# Patient Record
Sex: FEMALE | Race: WHITE | NOT HISPANIC OR LATINO | Employment: OTHER | URBAN - METROPOLITAN AREA
[De-identification: names, ages, dates, MRNs, and addresses within clinical notes are randomized per-mention and may not be internally consistent; named-entity substitution may affect disease eponyms.]

---

## 2023-03-10 ENCOUNTER — TELEPHONE (OUTPATIENT)
Dept: PRIMARY CARE | Facility: CLINIC | Age: 78
End: 2023-03-10
Payer: MEDICARE

## 2023-03-10 DIAGNOSIS — I10 HYPERTENSION, UNSPECIFIED TYPE: Primary | ICD-10-CM

## 2023-03-10 RX ORDER — METOPROLOL TARTRATE 50 MG/1
50 TABLET ORAL 2 TIMES DAILY
Qty: 180 TABLET | Refills: 3 | Status: SHIPPED | OUTPATIENT
Start: 2023-03-10

## 2023-03-10 RX ORDER — METOPROLOL TARTRATE 50 MG/1
50 TABLET ORAL 2 TIMES DAILY
COMMUNITY
End: 2023-03-10 | Stop reason: SDUPTHER

## 2023-03-27 ENCOUNTER — TELEPHONE (OUTPATIENT)
Dept: PRIMARY CARE | Facility: CLINIC | Age: 78
End: 2023-03-27
Payer: MEDICARE

## 2023-03-27 NOTE — TELEPHONE ENCOUNTER
Refill BD pen needles Buffy 32G x 4mm uses 6 daily.      Refill 90 day supply w/Refills  to [facility] [place]

## 2023-03-28 DIAGNOSIS — E11.9 TYPE 2 DIABETES MELLITUS WITHOUT COMPLICATION, WITHOUT LONG-TERM CURRENT USE OF INSULIN (MULTI): ICD-10-CM

## 2023-03-28 PROBLEM — H61.20 CERUMEN IMPACTION: Status: ACTIVE | Noted: 2023-03-28

## 2023-03-28 PROBLEM — D17.71 ANGIOMYOLIPOMA OF LEFT KIDNEY: Status: ACTIVE | Noted: 2023-03-28

## 2023-03-28 PROBLEM — H25.812 COMBINED FORMS OF AGE-RELATED CATARACT OF LEFT EYE: Status: ACTIVE | Noted: 2023-03-28

## 2023-03-28 PROBLEM — R74.01 ELEVATED TRANSAMINASE LEVEL: Status: ACTIVE | Noted: 2023-03-28

## 2023-03-28 PROBLEM — C64.2 RENAL CELL CARCINOMA OF LEFT KIDNEY (MULTI): Status: ACTIVE | Noted: 2023-03-28

## 2023-03-28 PROBLEM — E88.810 INSULIN RESISTANCE SYNDROME: Status: ACTIVE | Noted: 2023-03-28

## 2023-03-28 PROBLEM — G47.00 INSOMNIA: Status: ACTIVE | Noted: 2023-03-28

## 2023-03-28 PROBLEM — R92.2 DENSE BREAST: Status: ACTIVE | Noted: 2023-03-28

## 2023-03-28 PROBLEM — H43.11 VITREOUS HEMORRHAGE OF RIGHT EYE (MULTI): Status: ACTIVE | Noted: 2022-06-28

## 2023-03-28 PROBLEM — E83.52 HYPERCALCEMIA: Status: ACTIVE | Noted: 2023-03-28

## 2023-03-28 PROBLEM — H35.89 RADIATION RETINOPATHY: Status: ACTIVE | Noted: 2023-03-28

## 2023-03-28 PROBLEM — H16.223 KERATOCONJUNCTIVITIS SICCA OF BOTH EYES NOT DUE TO SJOGREN'S SYNDROME: Status: ACTIVE | Noted: 2023-03-28

## 2023-03-28 PROBLEM — H40.053 OCULAR HYPERTENSION, BILATERAL: Status: ACTIVE | Noted: 2023-03-28

## 2023-03-28 PROBLEM — M25.512 LEFT SHOULDER PAIN: Status: ACTIVE | Noted: 2023-03-28

## 2023-03-28 PROBLEM — H52.03 HYPERMETROPIA OF BOTH EYES: Status: ACTIVE | Noted: 2021-08-04

## 2023-03-28 PROBLEM — E55.9 VITAMIN D DEFICIENCY: Status: ACTIVE | Noted: 2023-03-28

## 2023-03-28 PROBLEM — S05.01XA CORNEAL ABRASION, RIGHT: Status: ACTIVE | Noted: 2023-03-28

## 2023-03-28 PROBLEM — Z96.1 PSEUDOPHAKIA OF BOTH EYES: Status: ACTIVE | Noted: 2021-12-16

## 2023-03-28 PROBLEM — H52.13 MYOPIA WITH PRESBYOPIA OF BOTH EYES: Status: ACTIVE | Noted: 2021-08-04

## 2023-03-28 PROBLEM — H52.202 ASTIGMATISM OF LEFT EYE: Status: ACTIVE | Noted: 2021-08-04

## 2023-03-28 PROBLEM — H25.12 AGE-RELATED NUCLEAR CATARACT, LEFT: Status: ACTIVE | Noted: 2023-03-28

## 2023-03-28 PROBLEM — Z85.048: Status: ACTIVE | Noted: 2022-06-28

## 2023-03-28 PROBLEM — H35.371 EPIRETINAL MEMBRANE (ERM) OF RIGHT EYE: Status: ACTIVE | Noted: 2023-03-28

## 2023-03-28 PROBLEM — K21.9 GASTRO-ESOPHAGEAL REFLUX DISEASE WITHOUT ESOPHAGITIS: Status: ACTIVE | Noted: 2022-06-28

## 2023-03-28 PROBLEM — D51.9 VITAMIN B12 DEFICIENCY ANEMIA: Status: ACTIVE | Noted: 2023-03-28

## 2023-03-28 PROBLEM — R30.0 DYSURIA: Status: ACTIVE | Noted: 2023-03-28

## 2023-03-28 PROBLEM — E53.8 VITAMIN B 12 DEFICIENCY: Status: ACTIVE | Noted: 2023-03-28

## 2023-03-28 PROBLEM — R92.30 DENSE BREAST: Status: ACTIVE | Noted: 2023-03-28

## 2023-03-28 PROBLEM — H52.4 MYOPIA WITH PRESBYOPIA OF BOTH EYES: Status: ACTIVE | Noted: 2021-08-04

## 2023-03-28 PROBLEM — H02.88A MEIBOMIAN GLAND DYSFUNCTION (MGD) OF UPPER AND LOWER EYELID OF RIGHT EYE: Status: ACTIVE | Noted: 2023-03-28

## 2023-03-28 PROBLEM — H40.9 GLAUCOMA: Status: ACTIVE | Noted: 2023-03-28

## 2023-03-28 PROBLEM — H91.92 HEARING LOSS OF LEFT EAR: Status: ACTIVE | Noted: 2023-03-28

## 2023-03-28 PROBLEM — D17.71 ANGIOMYOLIPOMA OF RIGHT KIDNEY: Status: ACTIVE | Noted: 2023-03-28

## 2023-03-28 PROBLEM — E03.9 HYPOTHYROIDISM: Status: ACTIVE | Noted: 2022-06-28

## 2023-03-28 PROBLEM — N60.99 ATYPICAL DUCTAL HYPERPLASIA OF BREAST: Status: ACTIVE | Noted: 2023-03-28

## 2023-03-28 PROBLEM — E78.5 HYPERLIPIDEMIA, UNSPECIFIED: Status: ACTIVE | Noted: 2022-06-28

## 2023-03-28 PROBLEM — E78.1 HYPERTRIGLYCERIDEMIA: Status: ACTIVE | Noted: 2023-03-28

## 2023-03-28 PROBLEM — I10 HYPERTENSION: Status: ACTIVE | Noted: 2022-06-28

## 2023-03-28 PROBLEM — H90.5 PERCEPTIVE HEARING LOSS: Status: ACTIVE | Noted: 2023-03-28

## 2023-03-28 PROBLEM — C43.9 MELANOMA (MULTI): Status: ACTIVE | Noted: 2023-03-28

## 2023-03-28 PROBLEM — T50.8X5A ALLERGIC REACTION TO CONTRAST DYE: Status: ACTIVE | Noted: 2023-03-28

## 2023-03-28 PROBLEM — H02.88B MEIBOMIAN GLAND DYSFUNCTION (MGD) OF UPPER AND LOWER EYELID OF LEFT EYE: Status: ACTIVE | Noted: 2023-03-28

## 2023-03-28 PROBLEM — H04.123 DRY EYE SYNDROME OF BILATERAL LACRIMAL GLANDS: Status: ACTIVE | Noted: 2023-03-28

## 2023-03-28 PROBLEM — N39.0 URINARY TRACT INFECTION: Status: ACTIVE | Noted: 2023-03-28

## 2023-03-28 PROBLEM — R53.83 FATIGUE: Status: ACTIVE | Noted: 2023-03-28

## 2023-03-28 PROBLEM — M25.561 RIGHT KNEE PAIN: Status: ACTIVE | Noted: 2023-03-28

## 2023-03-28 PROBLEM — R92.8 ABNORMAL FINDING ON BREAST IMAGING: Status: ACTIVE | Noted: 2023-03-28

## 2023-03-28 PROBLEM — D86.9 SARCOIDOSIS: Status: ACTIVE | Noted: 2023-03-28

## 2023-03-28 PROBLEM — H43.811 PVD (POSTERIOR VITREOUS DETACHMENT), RIGHT EYE: Status: ACTIVE | Noted: 2023-03-28

## 2023-03-28 PROBLEM — F41.9 ANXIETY: Status: ACTIVE | Noted: 2023-03-28

## 2023-03-28 PROBLEM — N20.0 NEPHROLITHIASIS: Status: ACTIVE | Noted: 2023-03-28

## 2023-03-28 PROBLEM — N28.9 RENAL INSUFFICIENCY: Status: ACTIVE | Noted: 2023-03-28

## 2023-03-28 PROBLEM — T66.XXXA RADIATION RETINOPATHY: Status: ACTIVE | Noted: 2023-03-28

## 2023-03-28 PROBLEM — L29.9 PRURITUS: Status: ACTIVE | Noted: 2023-03-28

## 2023-03-28 PROBLEM — K31.84 GASTROPARESIS: Status: ACTIVE | Noted: 2023-03-28

## 2023-03-28 PROBLEM — H18.49: Status: ACTIVE | Noted: 2021-07-06

## 2023-03-28 PROBLEM — C69.31 CHOROID MELANOMA OF RIGHT EYE (MULTI): Status: ACTIVE | Noted: 2023-03-28

## 2023-03-28 PROBLEM — R82.90 FOUL SMELLING URINE: Status: ACTIVE | Noted: 2023-03-28

## 2023-03-28 RX ORDER — PEN NEEDLE, DIABETIC 32GX 5/32"
NEEDLE, DISPOSABLE MISCELLANEOUS
Qty: 540 EACH | Refills: 3 | Status: SHIPPED | OUTPATIENT
Start: 2023-03-28

## 2023-03-28 RX ORDER — SYRINGE WITH NEEDLE, 1 ML 25GX5/8"
SYRINGE, EMPTY DISPOSABLE MISCELLANEOUS
COMMUNITY
Start: 2022-11-08

## 2023-03-28 RX ORDER — FENOFIBRATE 145 MG/1
1 TABLET, FILM COATED ORAL DAILY
COMMUNITY
Start: 2016-07-06 | End: 2023-07-18 | Stop reason: SDUPTHER

## 2023-03-28 RX ORDER — CYANOCOBALAMIN 1000 UG/ML
INJECTION, SOLUTION INTRAMUSCULAR; SUBCUTANEOUS WEEKLY
COMMUNITY
Start: 2022-06-06 | End: 2023-12-04 | Stop reason: SDUPTHER

## 2023-03-28 RX ORDER — TAFLUPROST OPTHALMIC 0 MG/.3ML
SOLUTION/ DROPS OPHTHALMIC
COMMUNITY
Start: 2017-12-29 | End: 2023-12-04 | Stop reason: ALTCHOICE

## 2023-03-28 RX ORDER — ASPIRIN 81 MG
TABLET, DELAYED RELEASE (ENTERIC COATED) ORAL
COMMUNITY
Start: 2022-11-29

## 2023-03-28 RX ORDER — ESCITALOPRAM OXALATE 10 MG/1
1 TABLET ORAL NIGHTLY
COMMUNITY
Start: 2020-10-07 | End: 2023-05-09 | Stop reason: SDUPTHER

## 2023-03-28 RX ORDER — IBUPROFEN 100 MG/5ML
1 SUSPENSION, ORAL (FINAL DOSE FORM) ORAL DAILY
COMMUNITY
Start: 2021-07-12 | End: 2023-12-04 | Stop reason: ALTCHOICE

## 2023-03-28 RX ORDER — AMLODIPINE BESYLATE 2.5 MG/1
1 TABLET ORAL DAILY
COMMUNITY
Start: 2021-01-22 | End: 2023-09-13 | Stop reason: SDUPTHER

## 2023-03-28 RX ORDER — DORZOLAMIDE HYDROCHLORIDE AND TIMOLOL MALEATE PRESERVATIVE FREE 20; 5 MG/ML; MG/ML
SOLUTION/ DROPS OPHTHALMIC 2 TIMES DAILY
COMMUNITY
Start: 2017-12-29 | End: 2023-12-04 | Stop reason: ALTCHOICE

## 2023-03-28 RX ORDER — VALSARTAN 320 MG/1
1 TABLET ORAL DAILY
COMMUNITY
Start: 2016-07-06 | End: 2024-03-21 | Stop reason: SDUPTHER

## 2023-03-28 RX ORDER — PEN NEEDLE, DIABETIC 32GX 5/32"
NEEDLE, DISPOSABLE MISCELLANEOUS
COMMUNITY
Start: 2023-01-23 | End: 2023-03-28 | Stop reason: SDUPTHER

## 2023-03-28 RX ORDER — PREDNISOLONE ACETATE 10 MG/ML
SUSPENSION/ DROPS OPHTHALMIC 4 TIMES DAILY
COMMUNITY
Start: 2021-11-08 | End: 2023-12-04 | Stop reason: ALTCHOICE

## 2023-03-28 RX ORDER — ASPIRIN 81 MG/1
1 TABLET ORAL DAILY
COMMUNITY
Start: 2016-07-06

## 2023-03-28 RX ORDER — ASPIRIN 325 MG
TABLET, DELAYED RELEASE (ENTERIC COATED) ORAL
COMMUNITY
Start: 2022-06-02 | End: 2023-09-13 | Stop reason: SDUPTHER

## 2023-03-28 RX ORDER — METFORMIN HYDROCHLORIDE 1000 MG/1
1 TABLET ORAL
COMMUNITY
Start: 2016-07-06 | End: 2024-01-09 | Stop reason: SDUPTHER

## 2023-03-28 RX ORDER — MOXIFLOXACIN 5 MG/ML
SOLUTION/ DROPS OPHTHALMIC 4 TIMES DAILY
COMMUNITY
Start: 2021-11-08 | End: 2023-12-04 | Stop reason: ALTCHOICE

## 2023-03-28 RX ORDER — PANTOPRAZOLE SODIUM 40 MG/1
1 TABLET, DELAYED RELEASE ORAL DAILY
COMMUNITY
Start: 2020-09-19 | End: 2023-09-06 | Stop reason: ALTCHOICE

## 2023-03-28 RX ORDER — KETOROLAC TROMETHAMINE 5 MG/ML
SOLUTION OPHTHALMIC
COMMUNITY
Start: 2021-12-03 | End: 2023-12-04 | Stop reason: ALTCHOICE

## 2023-03-28 RX ORDER — LEVOTHYROXINE SODIUM 125 UG/1
1 TABLET ORAL DAILY
COMMUNITY
Start: 2018-01-26 | End: 2024-01-05 | Stop reason: SDUPTHER

## 2023-03-28 RX ORDER — ORAL SEMAGLUTIDE 7 MG/1
1 TABLET ORAL DAILY
COMMUNITY
Start: 2022-11-29 | End: 2023-09-05 | Stop reason: SDUPTHER

## 2023-03-28 RX ORDER — ATORVASTATIN CALCIUM 10 MG/1
1 TABLET, FILM COATED ORAL DAILY
COMMUNITY
Start: 2020-09-19 | End: 2023-10-05 | Stop reason: SDUPTHER

## 2023-03-28 RX ORDER — INSULIN GLARGINE 100 [IU]/ML
INJECTION, SOLUTION SUBCUTANEOUS
COMMUNITY
Start: 2016-07-06 | End: 2023-07-17 | Stop reason: SDUPTHER

## 2023-03-28 RX ORDER — CYCLOSPORINE 0.5 MG/ML
EMULSION OPHTHALMIC EVERY 12 HOURS
COMMUNITY
Start: 2021-04-27 | End: 2023-12-04 | Stop reason: ALTCHOICE

## 2023-04-24 ENCOUNTER — TELEPHONE (OUTPATIENT)
Dept: PRIMARY CARE | Facility: CLINIC | Age: 78
End: 2023-04-24
Payer: MEDICARE

## 2023-04-24 DIAGNOSIS — Z79.4 TYPE 2 DIABETES MELLITUS WITH OTHER SPECIFIED COMPLICATION, WITH LONG-TERM CURRENT USE OF INSULIN (MULTI): Primary | ICD-10-CM

## 2023-04-24 DIAGNOSIS — E11.69 TYPE 2 DIABETES MELLITUS WITH OTHER SPECIFIED COMPLICATION, WITH LONG-TERM CURRENT USE OF INSULIN (MULTI): Primary | ICD-10-CM

## 2023-04-24 RX ORDER — INSULIN LISPRO 100 [IU]/ML
10 INJECTION, SOLUTION INTRAVENOUS; SUBCUTANEOUS
COMMUNITY
Start: 2016-07-06 | End: 2023-04-24 | Stop reason: SDUPTHER

## 2023-04-24 RX ORDER — INSULIN LISPRO 100 [IU]/ML
INJECTION, SOLUTION INTRAVENOUS; SUBCUTANEOUS
Qty: 15 ML | Refills: 3 | Status: SHIPPED | OUTPATIENT
Start: 2023-04-24 | End: 2023-09-06 | Stop reason: SDUPTHER

## 2023-04-24 NOTE — TELEPHONE ENCOUNTER
Humalog Kwik Pen 100Unit/ML Inject 10U before meals and up to 10U per sliding scale.  .[id number] pen w/3 Refills    [facility] [place]

## 2023-05-09 DIAGNOSIS — F41.9 ANXIETY: ICD-10-CM

## 2023-05-09 RX ORDER — ESCITALOPRAM OXALATE 10 MG/1
10 TABLET ORAL NIGHTLY
Qty: 90 TABLET | Refills: 3 | Status: SHIPPED | OUTPATIENT
Start: 2023-05-09 | End: 2024-05-23 | Stop reason: SDUPTHER

## 2023-07-17 ENCOUNTER — TELEPHONE (OUTPATIENT)
Dept: PRIMARY CARE | Facility: CLINIC | Age: 78
End: 2023-07-17
Payer: MEDICARE

## 2023-07-17 DIAGNOSIS — E78.1 HYPERTRIGLYCERIDEMIA: ICD-10-CM

## 2023-07-17 DIAGNOSIS — E11.9 CONTROLLED TYPE 2 DIABETES MELLITUS WITHOUT COMPLICATION, WITH LONG-TERM CURRENT USE OF INSULIN (MULTI): ICD-10-CM

## 2023-07-17 DIAGNOSIS — Z79.4 CONTROLLED TYPE 2 DIABETES MELLITUS WITHOUT COMPLICATION, WITH LONG-TERM CURRENT USE OF INSULIN (MULTI): ICD-10-CM

## 2023-07-17 NOTE — TELEPHONE ENCOUNTER
Refill 90 day supply w/Refills  to [facility]    -fenofibrate (Tricor) 145 mg tablet 1XDAY  -Lantus Solostar U-100 Insulin 100 unit/mL (3 mL) pen

## 2023-07-18 RX ORDER — FENOFIBRATE 145 MG/1
145 TABLET, FILM COATED ORAL DAILY
Qty: 90 TABLET | Refills: 3 | Status: SHIPPED | OUTPATIENT
Start: 2023-07-18 | End: 2023-07-20 | Stop reason: SDUPTHER

## 2023-07-18 RX ORDER — INSULIN GLARGINE 100 [IU]/ML
60 INJECTION, SOLUTION SUBCUTANEOUS 2 TIMES DAILY
Qty: 45 ML | Refills: 1 | Status: SHIPPED | OUTPATIENT
Start: 2023-07-18 | End: 2023-07-21 | Stop reason: SDUPTHER

## 2023-07-20 ENCOUNTER — TELEPHONE (OUTPATIENT)
Dept: PRIMARY CARE | Facility: CLINIC | Age: 78
End: 2023-07-20
Payer: MEDICARE

## 2023-07-20 DIAGNOSIS — E78.1 HYPERTRIGLYCERIDEMIA: ICD-10-CM

## 2023-07-20 RX ORDER — FENOFIBRATE 145 MG/1
145 TABLET, FILM COATED ORAL DAILY
Qty: 90 TABLET | Refills: 3 | Status: SHIPPED | OUTPATIENT
Start: 2023-07-20 | End: 2024-05-06

## 2023-07-21 ENCOUNTER — TELEPHONE (OUTPATIENT)
Dept: PRIMARY CARE | Facility: CLINIC | Age: 78
End: 2023-07-21
Payer: MEDICARE

## 2023-07-21 DIAGNOSIS — E11.9 CONTROLLED TYPE 2 DIABETES MELLITUS WITHOUT COMPLICATION, WITH LONG-TERM CURRENT USE OF INSULIN (MULTI): ICD-10-CM

## 2023-07-21 DIAGNOSIS — Z79.4 CONTROLLED TYPE 2 DIABETES MELLITUS WITHOUT COMPLICATION, WITH LONG-TERM CURRENT USE OF INSULIN (MULTI): ICD-10-CM

## 2023-07-21 RX ORDER — INSULIN GLARGINE 100 [IU]/ML
60 INJECTION, SOLUTION SUBCUTANEOUS 2 TIMES DAILY
Qty: 45 ML | Refills: 1 | Status: SHIPPED | OUTPATIENT
Start: 2023-07-21 | End: 2023-11-20 | Stop reason: SDUPTHER

## 2023-07-21 NOTE — TELEPHONE ENCOUNTER
Noy, can you please resend the Lantus.  It was sent to wrong pharmacy!!    Send to correct pharmacy at [facility]    THANK YOU!!

## 2023-09-01 ENCOUNTER — TELEPHONE (OUTPATIENT)
Dept: PRIMARY CARE | Facility: CLINIC | Age: 78
End: 2023-09-01
Payer: MEDICARE

## 2023-09-05 DIAGNOSIS — E11.9 TYPE 2 DIABETES MELLITUS WITHOUT COMPLICATION, WITH LONG-TERM CURRENT USE OF INSULIN (MULTI): ICD-10-CM

## 2023-09-05 DIAGNOSIS — Z79.4 TYPE 2 DIABETES MELLITUS WITHOUT COMPLICATION, WITH LONG-TERM CURRENT USE OF INSULIN (MULTI): ICD-10-CM

## 2023-09-05 RX ORDER — ORAL SEMAGLUTIDE 7 MG/1
1 TABLET ORAL DAILY
Qty: 90 TABLET | Refills: 3 | Status: SHIPPED | OUTPATIENT
Start: 2023-09-05 | End: 2023-12-04 | Stop reason: DRUGHIGH

## 2023-09-06 ENCOUNTER — OFFICE VISIT (OUTPATIENT)
Dept: PRIMARY CARE | Facility: CLINIC | Age: 78
End: 2023-09-06
Payer: MEDICARE

## 2023-09-06 VITALS
HEART RATE: 74 BPM | RESPIRATION RATE: 16 BRPM | DIASTOLIC BLOOD PRESSURE: 84 MMHG | WEIGHT: 206 LBS | HEIGHT: 67 IN | OXYGEN SATURATION: 99 % | BODY MASS INDEX: 32.33 KG/M2 | SYSTOLIC BLOOD PRESSURE: 128 MMHG

## 2023-09-06 DIAGNOSIS — E78.5 HYPERLIPIDEMIA, UNSPECIFIED HYPERLIPIDEMIA TYPE: ICD-10-CM

## 2023-09-06 DIAGNOSIS — I10 PRIMARY HYPERTENSION: ICD-10-CM

## 2023-09-06 DIAGNOSIS — E03.9 HYPOTHYROIDISM, UNSPECIFIED TYPE: ICD-10-CM

## 2023-09-06 DIAGNOSIS — E11.69 TYPE 2 DIABETES MELLITUS WITH OTHER SPECIFIED COMPLICATION, WITH LONG-TERM CURRENT USE OF INSULIN (MULTI): ICD-10-CM

## 2023-09-06 DIAGNOSIS — R06.2 WHEEZING: ICD-10-CM

## 2023-09-06 DIAGNOSIS — Z79.4 TYPE 2 DIABETES MELLITUS WITHOUT COMPLICATION, WITH LONG-TERM CURRENT USE OF INSULIN (MULTI): ICD-10-CM

## 2023-09-06 DIAGNOSIS — Z79.4 TYPE 2 DIABETES MELLITUS WITH OTHER SPECIFIED COMPLICATION, WITH LONG-TERM CURRENT USE OF INSULIN (MULTI): ICD-10-CM

## 2023-09-06 DIAGNOSIS — R06.09 DYSPNEA ON EXERTION: Primary | ICD-10-CM

## 2023-09-06 DIAGNOSIS — E11.9 TYPE 2 DIABETES MELLITUS WITHOUT COMPLICATION, WITH LONG-TERM CURRENT USE OF INSULIN (MULTI): ICD-10-CM

## 2023-09-06 DIAGNOSIS — E55.9 MILD VITAMIN D DEFICIENCY: ICD-10-CM

## 2023-09-06 LAB — POC HEMOGLOBIN A1C: 8.4 % (ref 4.2–6.5)

## 2023-09-06 PROCEDURE — 99214 OFFICE O/P EST MOD 30 MIN: CPT | Performed by: INTERNAL MEDICINE

## 2023-09-06 PROCEDURE — 3079F DIAST BP 80-89 MM HG: CPT | Performed by: INTERNAL MEDICINE

## 2023-09-06 PROCEDURE — 83036 HEMOGLOBIN GLYCOSYLATED A1C: CPT | Performed by: INTERNAL MEDICINE

## 2023-09-06 PROCEDURE — 3074F SYST BP LT 130 MM HG: CPT | Performed by: INTERNAL MEDICINE

## 2023-09-06 PROCEDURE — 1036F TOBACCO NON-USER: CPT | Performed by: INTERNAL MEDICINE

## 2023-09-06 PROCEDURE — 1159F MED LIST DOCD IN RCRD: CPT | Performed by: INTERNAL MEDICINE

## 2023-09-06 RX ORDER — INSULIN LISPRO 100 [IU]/ML
INJECTION, SOLUTION INTRAVENOUS; SUBCUTANEOUS
Qty: 15 ML | Refills: 11 | Status: SHIPPED | OUTPATIENT
Start: 2023-09-06 | End: 2023-09-06 | Stop reason: SDUPTHER

## 2023-09-06 RX ORDER — INSULIN LISPRO 100 [IU]/ML
INJECTION, SOLUTION INTRAVENOUS; SUBCUTANEOUS
Qty: 15 ML | Refills: 11 | Status: SHIPPED | OUTPATIENT
Start: 2023-09-06 | End: 2024-05-02 | Stop reason: SDUPTHER

## 2023-09-06 RX ORDER — ALBUTEROL SULFATE 90 UG/1
2 AEROSOL, METERED RESPIRATORY (INHALATION) EVERY 4 HOURS PRN
Qty: 6.7 G | Refills: 5 | Status: SHIPPED | OUTPATIENT
Start: 2023-09-06 | End: 2024-06-04 | Stop reason: SDUPTHER

## 2023-09-06 ASSESSMENT — ENCOUNTER SYMPTOMS
MYALGIAS: 0
VOMITING: 0
CONSTIPATION: 0
DIARRHEA: 0
SHORTNESS OF BREATH: 1
FEVER: 0
NAUSEA: 0
JOINT SWELLING: 0
COUGH: 0
CHILLS: 0
DIAPHORESIS: 0

## 2023-09-06 ASSESSMENT — PATIENT HEALTH QUESTIONNAIRE - PHQ9
2. FEELING DOWN, DEPRESSED OR HOPELESS: NOT AT ALL
1. LITTLE INTEREST OR PLEASURE IN DOING THINGS: NOT AT ALL
SUM OF ALL RESPONSES TO PHQ9 QUESTIONS 1 AND 2: 0

## 2023-09-06 NOTE — PATIENT INSTRUCTIONS
CHEST XRAY AND ECHO TO MAKE SURE HEART WORKING WELL AND NO INTRINSIC LUNG ISSUE    2.  TRIAL OF ALBUTEROL INHALER FOR PROBABLE ASTHM,A VS. MILD COPD    3.  INCREASE RYBELSUS TO 14 MG DAILY, THIS SHOULD HJELP BRING DOWN THE A1C WHICH IS 8.4% TODAY    4.  REFILL ON HUMALOG KWIKPEN SENT IN FOR YOU    5.  IF GLUCOSES AREN'T COMING DOWN WITH INCREASE IN RYBELSUS, PLEASE CALL BACK    6.  FASTING LABS ARE ORDERED FOR YOU    7.  6 MONTH FOLLOW UP OR AS NEEDED

## 2023-09-06 NOTE — PROGRESS NOTES
"Subjective   Luba Cortez is a 78 y.o. female who presents for FOLLOW UP   PT SAYS SHE HAS BEEN HAVING HIGH BLOOD SUGAR READING IN 'S   OCCASIONAL SOB   SHE WAS WALKING AT [facility] GAME HAD TO TAKE BREAKS SHOPPING   REFILLS NEEDED     HPI   HAD TROUBLE BREATHING GETTING BACK TO THE CAR, TO STOP AND CATCH BREATH, HAS HAPPENED TO HER BEFORE.    NO ASTHMA, PREVIOUS SMOKER.  MAYBE SOME WHEEZING.  NOT REALLY COUGHING UP PHLEGM.    HAVEN'T WENT ANYWHERE SINCE.  DIDN'T GET SHORT OF BREATH WALKING IN HERE.  SLEEP FLAT WITHOUT ORTHOPNEA OR PND.  NO LEG EDEMA.    LAST MONTH OR SO GLUCOSES GOING UP A LITTLE.  TAKING 7 OF REBYLSUS.      TAKE HER MEDS AS PRESCRIBED  Review of Systems   Constitutional:  Negative for chills, diaphoresis and fever.   Respiratory:  Positive for shortness of breath. Negative for cough.    Cardiovascular:  Negative for chest pain and leg swelling.   Gastrointestinal:  Negative for constipation, diarrhea, nausea and vomiting.   Musculoskeletal:  Negative for joint swelling and myalgias.       Objective   /84   Pulse 74   Resp 16   Ht 1.702 m (5' 7\")   Wt 93.4 kg (206 lb)   SpO2 99%   BMI 32.26 kg/m²     Physical Exam  Vitals reviewed.   Constitutional:       General: She is not in acute distress.     Appearance: She is obese. She is not ill-appearing.   Neck:      Comments: NO ELEVATED JVD  Cardiovascular:      Rate and Rhythm: Normal rate and regular rhythm.      Pulses: Normal pulses.      Heart sounds: No murmur heard.     No gallop.   Pulmonary:      Breath sounds: Normal breath sounds. No wheezing, rhonchi or rales.      Comments: PROLONGED EXPIRATORY PHASE WITHOUT WHEEZE RALES OR RONCHI  Abdominal:      General: Abdomen is flat. Bowel sounds are normal.      Palpations: Abdomen is soft.      Tenderness: There is no guarding or rebound.   Musculoskeletal:      Right lower leg: No edema.      Left lower leg: No edema.      Comments: NO LEG EDEMA         Assessment/Plan "   Problem List Items Addressed This Visit       Diabetes mellitus, type 2 (CMS/[facility])    Relevant Medications    semaglutide (Rybelsus) 14 mg tablet tablet    HumaLOG KwikPen Insulin 100 unit/mL injection    Other Relevant Orders    POCT glycosylated hemoglobin (Hb A1C) manually resulted    Transthoracic Echo (TTE) Complete    Hypertension    Relevant Orders    Transthoracic Echo (TTE) Complete    Protein, Urine Random    Hyperlipidemia, unspecified    Relevant Orders    Vitamin B12    Lipid Panel    Comprehensive Metabolic Panel    CBC    Hypothyroidism    Relevant Orders    TSH with reflex to Free T4 if abnormal     Other Visit Diagnoses       Dyspnea on exertion    -  Primary    Relevant Orders    XR chest 2 views    Transthoracic Echo (TTE) Complete    Wheezing        Relevant Medications    albuterol (Proventil HFA) 90 mcg/actuation inhaler    Mild vitamin D deficiency        Relevant Orders    Vitamin D 25-Hydroxy,Total (for eval of Vitamin D levels)          Patient Instructions    CHEST XRAY AND ECHO TO MAKE SURE HEART WORKING WELL AND NO INTRINSIC LUNG ISSUE    2.  TRIAL OF ALBUTEROL INHALER FOR PROBABLE ASTHM,A VS. MILD COPD    3.  INCREASE RYBELSUS TO 14 MG DAILY, THIS SHOULD HJELP BRING DOWN THE A1C WHICH IS 8.4% TODAY    4.  REFILL ON HUMALOG KWIKPEN SENT IN FOR YOU    5.  IF GLUCOSES AREN'T COMING DOWN WITH INCREASE IN RYBELSUS, PLEASE CALL BACK    6.  FASTING LABS ARE ORDERED FOR YOU    7.  6 MONTH FOLLOW UP OR AS NEEDED

## 2023-09-12 ENCOUNTER — LAB (OUTPATIENT)
Dept: LAB | Facility: LAB | Age: 78
End: 2023-09-12
Payer: MEDICARE

## 2023-09-12 DIAGNOSIS — E03.9 HYPOTHYROIDISM, UNSPECIFIED TYPE: ICD-10-CM

## 2023-09-12 DIAGNOSIS — E78.5 HYPERLIPIDEMIA, UNSPECIFIED HYPERLIPIDEMIA TYPE: ICD-10-CM

## 2023-09-12 DIAGNOSIS — E55.9 MILD VITAMIN D DEFICIENCY: ICD-10-CM

## 2023-09-12 DIAGNOSIS — I10 PRIMARY HYPERTENSION: ICD-10-CM

## 2023-09-12 LAB
ALANINE AMINOTRANSFERASE (SGPT) (U/L) IN SER/PLAS: 12 U/L (ref 7–45)
ALBUMIN (G/DL) IN SER/PLAS: 3.7 G/DL (ref 3.4–5)
ALKALINE PHOSPHATASE (U/L) IN SER/PLAS: 65 U/L (ref 33–136)
ANION GAP IN SER/PLAS: 11 MMOL/L (ref 10–20)
ASPARTATE AMINOTRANSFERASE (SGOT) (U/L) IN SER/PLAS: 16 U/L (ref 9–39)
BILIRUBIN TOTAL (MG/DL) IN SER/PLAS: 0.5 MG/DL (ref 0–1.2)
CALCIDIOL (25 OH VITAMIN D3) (NG/ML) IN SER/PLAS: 13 NG/ML
CALCIUM (MG/DL) IN SER/PLAS: 9.9 MG/DL (ref 8.6–10.3)
CARBON DIOXIDE, TOTAL (MMOL/L) IN SER/PLAS: 27 MMOL/L (ref 21–32)
CHLORIDE (MMOL/L) IN SER/PLAS: 109 MMOL/L (ref 98–107)
CHOLESTEROL (MG/DL) IN SER/PLAS: 104 MG/DL (ref 0–199)
CHOLESTEROL IN HDL (MG/DL) IN SER/PLAS: 28 MG/DL
CHOLESTEROL/HDL RATIO: 3.7
COBALAMIN (VITAMIN B12) (PG/ML) IN SER/PLAS: 158 PG/ML (ref 211–911)
CREATININE (MG/DL) IN SER/PLAS: 1.14 MG/DL (ref 0.5–1.05)
CREATININE (MG/DL) IN URINE: 90.8 MG/DL (ref 20–320)
ERYTHROCYTE DISTRIBUTION WIDTH (RATIO) BY AUTOMATED COUNT: 13.6 % (ref 11.5–14.5)
ERYTHROCYTE MEAN CORPUSCULAR HEMOGLOBIN CONCENTRATION (G/DL) BY AUTOMATED: 32.3 G/DL (ref 32–36)
ERYTHROCYTE MEAN CORPUSCULAR VOLUME (FL) BY AUTOMATED COUNT: 95 FL (ref 80–100)
ERYTHROCYTES (10*6/UL) IN BLOOD BY AUTOMATED COUNT: 4.31 X10E12/L (ref 4–5.2)
GFR FEMALE: 50 ML/MIN/1.73M2
GLUCOSE (MG/DL) IN SER/PLAS: 222 MG/DL (ref 74–99)
HEMATOCRIT (%) IN BLOOD BY AUTOMATED COUNT: 40.9 % (ref 36–46)
HEMOGLOBIN (G/DL) IN BLOOD: 13.2 G/DL (ref 12–16)
LDL: 43 MG/DL (ref 0–99)
LEUKOCYTES (10*3/UL) IN BLOOD BY AUTOMATED COUNT: 6.6 X10E9/L (ref 4.4–11.3)
PLATELETS (10*3/UL) IN BLOOD AUTOMATED COUNT: 121 X10E9/L (ref 150–450)
POTASSIUM (MMOL/L) IN SER/PLAS: 4.5 MMOL/L (ref 3.5–5.3)
PROTEIN (MG/DL) IN URINE: 48 MG/DL (ref 5–24)
PROTEIN TOTAL: 6.2 G/DL (ref 6.4–8.2)
PROTEIN/CREATININE (MG/MG) IN URINE: 0.53 MG/MG CREAT (ref 0–0.17)
SODIUM (MMOL/L) IN SER/PLAS: 142 MMOL/L (ref 136–145)
THYROTROPIN (MIU/L) IN SER/PLAS BY DETECTION LIMIT <= 0.05 MIU/L: 0.37 MIU/L (ref 0.44–3.98)
THYROXINE (T4) FREE (NG/DL) IN SER/PLAS: 1.11 NG/DL (ref 0.61–1.12)
TRIGLYCERIDE (MG/DL) IN SER/PLAS: 163 MG/DL (ref 0–149)
UREA NITROGEN (MG/DL) IN SER/PLAS: 26 MG/DL (ref 6–23)
VLDL: 33 MG/DL (ref 0–40)

## 2023-09-12 PROCEDURE — 36415 COLL VENOUS BLD VENIPUNCTURE: CPT

## 2023-09-12 PROCEDURE — 82306 VITAMIN D 25 HYDROXY: CPT

## 2023-09-12 PROCEDURE — 82607 VITAMIN B-12: CPT

## 2023-09-12 PROCEDURE — 82570 ASSAY OF URINE CREATININE: CPT

## 2023-09-12 PROCEDURE — 80053 COMPREHEN METABOLIC PANEL: CPT

## 2023-09-12 PROCEDURE — 80061 LIPID PANEL: CPT

## 2023-09-12 PROCEDURE — 84443 ASSAY THYROID STIM HORMONE: CPT

## 2023-09-12 PROCEDURE — 84156 ASSAY OF PROTEIN URINE: CPT

## 2023-09-12 PROCEDURE — 84439 ASSAY OF FREE THYROXINE: CPT

## 2023-09-12 PROCEDURE — 85027 COMPLETE CBC AUTOMATED: CPT

## 2023-09-13 ENCOUNTER — TELEPHONE (OUTPATIENT)
Dept: PRIMARY CARE | Facility: CLINIC | Age: 78
End: 2023-09-13
Payer: MEDICARE

## 2023-09-13 DIAGNOSIS — E55.9 VITAMIN D DEFICIENCY: ICD-10-CM

## 2023-09-13 DIAGNOSIS — I10 PRIMARY HYPERTENSION: ICD-10-CM

## 2023-09-13 RX ORDER — AMLODIPINE BESYLATE 2.5 MG/1
2.5 TABLET ORAL DAILY
Qty: 90 TABLET | Refills: 3 | Status: SHIPPED | OUTPATIENT
Start: 2023-09-13

## 2023-09-13 RX ORDER — ASPIRIN 325 MG
50000 TABLET, DELAYED RELEASE (ENTERIC COATED) ORAL
Qty: 12 CAPSULE | Refills: 3 | Status: SHIPPED | OUTPATIENT
Start: 2023-09-13 | End: 2024-09-12

## 2023-09-13 NOTE — TELEPHONE ENCOUNTER
Refill 90 day supply w/Refills  to [facility]    -amLODIPine (Norvasc) 2.5 mg tablet 1XDAY

## 2023-09-28 PROBLEM — M19.019 ARTHROPATHY OF SHOULDER REGION: Status: ACTIVE | Noted: 2023-09-28

## 2023-09-28 PROBLEM — D24.1 INTRADUCTAL PAPILLOMA OF RIGHT BREAST: Status: ACTIVE | Noted: 2023-09-28

## 2023-09-28 PROBLEM — N60.09 SOLITARY CYST OF BREAST: Status: ACTIVE | Noted: 2023-09-28

## 2023-09-28 PROBLEM — E66.9 OBESITY, UNSPECIFIED: Status: ACTIVE | Noted: 2023-09-28

## 2023-09-28 PROBLEM — M71.9 DISORDER OF BURSAE AND TENDONS IN SHOULDER REGION: Status: ACTIVE | Noted: 2023-09-28

## 2023-09-28 PROBLEM — G56.20 LESION OF ULNAR NERVE: Status: ACTIVE | Noted: 2023-09-28

## 2023-09-28 PROBLEM — Z86.39 HISTORY OF DIABETES MELLITUS: Status: ACTIVE | Noted: 2023-09-28

## 2023-09-28 PROBLEM — M67.919 DISORDER OF BURSAE AND TENDONS IN SHOULDER REGION: Status: ACTIVE | Noted: 2023-09-28

## 2023-09-28 PROBLEM — T88.6XXA: Status: ACTIVE | Noted: 2023-09-28

## 2023-09-28 RX ORDER — TAMOXIFEN CITRATE 20 MG/1
1 TABLET ORAL DAILY
COMMUNITY
Start: 2020-01-13 | End: 2024-03-21 | Stop reason: SDUPTHER

## 2023-10-02 ENCOUNTER — HOSPITAL ENCOUNTER (OUTPATIENT)
Dept: CARDIOLOGY | Facility: CLINIC | Age: 78
Discharge: HOME | End: 2023-10-02
Payer: MEDICARE

## 2023-10-02 DIAGNOSIS — R06.09 DYSPNEA ON EXERTION: ICD-10-CM

## 2023-10-02 DIAGNOSIS — I15.8 OTHER SECONDARY HYPERTENSION: ICD-10-CM

## 2023-10-02 DIAGNOSIS — I10 HYPERTENSION, UNSPECIFIED TYPE: ICD-10-CM

## 2023-10-02 LAB — EJECTION FRACTION APICAL 4 CHAMBER: 62.3

## 2023-10-02 PROCEDURE — 93306 TTE W/DOPPLER COMPLETE: CPT

## 2023-10-02 PROCEDURE — 93306 TTE W/DOPPLER COMPLETE: CPT | Performed by: INTERNAL MEDICINE

## 2023-10-05 ENCOUNTER — TELEPHONE (OUTPATIENT)
Dept: PRIMARY CARE | Facility: CLINIC | Age: 78
End: 2023-10-05
Payer: MEDICARE

## 2023-10-05 DIAGNOSIS — E78.49 OTHER HYPERLIPIDEMIA: Primary | ICD-10-CM

## 2023-10-05 RX ORDER — ATORVASTATIN CALCIUM 10 MG/1
10 TABLET, FILM COATED ORAL DAILY
Qty: 90 TABLET | Refills: 0 | Status: SHIPPED | OUTPATIENT
Start: 2023-10-05 | End: 2024-01-05 | Stop reason: SDUPTHER

## 2023-10-05 NOTE — TELEPHONE ENCOUNTER
Refill 90 day supply w/Refills  to [facility]    -atorvastatin (Lipitor) 10 mg tablet 1XDAY

## 2023-10-12 ENCOUNTER — ANCILLARY PROCEDURE (OUTPATIENT)
Dept: RADIOLOGY | Facility: CLINIC | Age: 78
End: 2023-10-12
Payer: MEDICARE

## 2023-10-12 DIAGNOSIS — R06.09 DYSPNEA ON EXERTION: ICD-10-CM

## 2023-10-12 PROCEDURE — 71046 X-RAY EXAM CHEST 2 VIEWS: CPT | Performed by: RADIOLOGY

## 2023-10-12 PROCEDURE — 71046 X-RAY EXAM CHEST 2 VIEWS: CPT | Mod: FY

## 2023-10-16 ENCOUNTER — OFFICE VISIT (OUTPATIENT)
Dept: OPHTHALMOLOGY | Facility: CLINIC | Age: 78
End: 2023-10-16
Payer: MEDICARE

## 2023-10-16 DIAGNOSIS — E08.3311: ICD-10-CM

## 2023-10-16 DIAGNOSIS — H35.371 EPIRETINAL MEMBRANE (ERM) OF RIGHT EYE: Primary | ICD-10-CM

## 2023-10-16 DIAGNOSIS — T66.XXXD POST-RADIATION RETINOPATHY, SUBSEQUENT ENCOUNTER: ICD-10-CM

## 2023-10-16 DIAGNOSIS — H35.89 POST-RADIATION RETINOPATHY, SUBSEQUENT ENCOUNTER: ICD-10-CM

## 2023-10-16 PROBLEM — E11.3311: Status: ACTIVE | Noted: 2023-10-16

## 2023-10-16 PROCEDURE — 67028 INJECTION EYE DRUG: CPT | Mod: RIGHT SIDE | Performed by: OPHTHALMOLOGY

## 2023-10-16 PROCEDURE — 92134 CPTRZ OPH DX IMG PST SGM RTA: CPT | Mod: BILATERAL PROCEDURE | Performed by: OPHTHALMOLOGY

## 2023-10-16 ASSESSMENT — CUP TO DISC RATIO
OD_RATIO: 0.6
OS_RATIO: 0.3

## 2023-10-16 ASSESSMENT — TONOMETRY
OD_IOP_MMHG: 18
IOP_METHOD: GOLDMANN APPLANATION
OS_IOP_MMHG: 16

## 2023-10-16 ASSESSMENT — ENCOUNTER SYMPTOMS: EYES NEGATIVE: 1

## 2023-10-16 ASSESSMENT — EXTERNAL EXAM - RIGHT EYE: OD_EXAM: NORMAL

## 2023-10-16 ASSESSMENT — VISUAL ACUITY
METHOD: SNELLEN - LINEAR
OD_SC: 20/100
OS_SC: 20/30

## 2023-10-16 ASSESSMENT — SLIT LAMP EXAM - LIDS
COMMENTS: NORMAL
COMMENTS: NORMAL

## 2023-10-16 ASSESSMENT — CONF VISUAL FIELD
OS_INFERIOR_TEMPORAL_RESTRICTION: 0
OS_SUPERIOR_TEMPORAL_RESTRICTION: 0
OS_INFERIOR_NASAL_RESTRICTION: 0
OS_NORMAL: 1
OS_SUPERIOR_NASAL_RESTRICTION: 0

## 2023-10-16 ASSESSMENT — EXTERNAL EXAM - LEFT EYE: OS_EXAM: NORMAL

## 2023-10-16 NOTE — PROGRESS NOTES
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  -appears stable  -Last CT abd pelvis 9/2017: no liver metastasis- small nodule that has not changed.    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 Healthy lifestyle.       4) OHTN OU, glaucoma suspect  -continue FU and gtts per Dr. Lim.     5 today has DME OD       Treatment options for CNV OD  discussed, including observation, anti-VEGF injections (including Avastin, Lucentis,   Eylea  Vabysmo and  Beovu ), and  laser. Recommend anti-VEGF injections. Eylea done OD today in a sterile manner with Betadine 5% for antisepsis.

## 2023-11-20 ENCOUNTER — TELEPHONE (OUTPATIENT)
Dept: PRIMARY CARE | Facility: CLINIC | Age: 78
End: 2023-11-20
Payer: MEDICARE

## 2023-11-20 DIAGNOSIS — Z79.4 CONTROLLED TYPE 2 DIABETES MELLITUS WITHOUT COMPLICATION, WITH LONG-TERM CURRENT USE OF INSULIN (MULTI): ICD-10-CM

## 2023-11-20 DIAGNOSIS — E11.9 CONTROLLED TYPE 2 DIABETES MELLITUS WITHOUT COMPLICATION, WITH LONG-TERM CURRENT USE OF INSULIN (MULTI): ICD-10-CM

## 2023-11-20 RX ORDER — INSULIN GLARGINE 100 [IU]/ML
45 INJECTION, SOLUTION SUBCUTANEOUS 2 TIMES DAILY
Qty: 3 EACH | Refills: 1 | Status: SHIPPED | OUTPATIENT
Start: 2023-11-20 | End: 2023-12-04 | Stop reason: SDUPTHER

## 2023-11-20 NOTE — TELEPHONE ENCOUNTER
Refill 90 day supply w/Refills  to [facility]    -insulin glargine (Lantus Solostar U-100 Insulin) 100 unit/mL (3 mL) pen 60 UNITS 2XDAILY.      **  Sig: Inject 60 Units under the skin 2 times a day.   Patient taking differently: Inject 45 Units under the skin 2 times a day.

## 2023-11-29 ENCOUNTER — OFFICE VISIT (OUTPATIENT)
Dept: OPHTHALMOLOGY | Facility: CLINIC | Age: 78
End: 2023-11-29
Payer: MEDICARE

## 2023-11-29 DIAGNOSIS — E11.3311 MODERATE NONPROLIFERATIVE DIABETIC RETINOPATHY OF RIGHT EYE WITH MACULAR EDEMA ASSOCIATED WITH TYPE 2 DIABETES MELLITUS (MULTI): ICD-10-CM

## 2023-11-29 DIAGNOSIS — H35.371 EPIRETINAL MEMBRANE (ERM) OF RIGHT EYE: Primary | ICD-10-CM

## 2023-11-29 PROCEDURE — 92134 CPTRZ OPH DX IMG PST SGM RTA: CPT | Mod: BILATERAL PROCEDURE | Performed by: OPHTHALMOLOGY

## 2023-11-29 PROCEDURE — 67028 INJECTION EYE DRUG: CPT | Mod: RIGHT SIDE | Performed by: OPHTHALMOLOGY

## 2023-11-29 PROCEDURE — 99213 OFFICE O/P EST LOW 20 MIN: CPT | Performed by: OPHTHALMOLOGY

## 2023-11-29 ASSESSMENT — ENCOUNTER SYMPTOMS
CONSTITUTIONAL NEGATIVE: 0
NEUROLOGICAL NEGATIVE: 0
PSYCHIATRIC NEGATIVE: 0
MUSCULOSKELETAL NEGATIVE: 0
RESPIRATORY NEGATIVE: 0
CARDIOVASCULAR NEGATIVE: 0
HEMATOLOGIC/LYMPHATIC NEGATIVE: 0
EYES NEGATIVE: 0
GASTROINTESTINAL NEGATIVE: 0
ALLERGIC/IMMUNOLOGIC NEGATIVE: 0
ENDOCRINE NEGATIVE: 0

## 2023-11-29 ASSESSMENT — TONOMETRY
OD_IOP_MMHG: 13
IOP_METHOD: TONOPEN
OS_IOP_MMHG: 13

## 2023-11-29 ASSESSMENT — VISUAL ACUITY
OD_SC+: -2
OD_SC: 20/100
OS_SC: 20/25
METHOD: SNELLEN - LINEAR

## 2023-11-29 NOTE — PROGRESS NOTES
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  -appears stable  -Last CT abd pelvis 9/2017: no liver metastasis- small nodule that has not changed.    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 Healthy lifestyle.       4) OHTN OU, glaucoma suspect  -continue FU and gtts per Dr. Lim.     5 today has DME OD       Treatment options for DME OD  discussed, including observation, anti-VEGF injections (including Avastin, Lucentis,   Eylea  Vabysmo and  Beovu ), and  laser. Recommend anti-VEGF injections. Eylea done OD today in a sterile manner with Betadine 5% for antisepsis.

## 2023-12-04 ENCOUNTER — OFFICE VISIT (OUTPATIENT)
Dept: PRIMARY CARE | Facility: CLINIC | Age: 78
End: 2023-12-04
Payer: MEDICARE

## 2023-12-04 DIAGNOSIS — Z79.4 CONTROLLED TYPE 2 DIABETES MELLITUS WITHOUT COMPLICATION, WITH LONG-TERM CURRENT USE OF INSULIN (MULTI): ICD-10-CM

## 2023-12-04 DIAGNOSIS — E53.8 VITAMIN B 12 DEFICIENCY: ICD-10-CM

## 2023-12-04 DIAGNOSIS — Z00.00 ROUTINE GENERAL MEDICAL EXAMINATION AT HEALTH CARE FACILITY: ICD-10-CM

## 2023-12-04 DIAGNOSIS — Z12.31 ENCOUNTER FOR SCREENING MAMMOGRAM FOR BREAST CANCER: ICD-10-CM

## 2023-12-04 DIAGNOSIS — Z00.00 MEDICARE ANNUAL WELLNESS VISIT, SUBSEQUENT: Primary | ICD-10-CM

## 2023-12-04 DIAGNOSIS — E11.9 CONTROLLED TYPE 2 DIABETES MELLITUS WITHOUT COMPLICATION, WITH LONG-TERM CURRENT USE OF INSULIN (MULTI): ICD-10-CM

## 2023-12-04 DIAGNOSIS — Z79.4 TYPE 2 DIABETES MELLITUS WITHOUT COMPLICATION, WITH LONG-TERM CURRENT USE OF INSULIN (MULTI): ICD-10-CM

## 2023-12-04 DIAGNOSIS — E11.9 TYPE 2 DIABETES MELLITUS WITHOUT COMPLICATION, WITH LONG-TERM CURRENT USE OF INSULIN (MULTI): ICD-10-CM

## 2023-12-04 PROCEDURE — 1159F MED LIST DOCD IN RCRD: CPT | Performed by: INTERNAL MEDICINE

## 2023-12-04 PROCEDURE — G0439 PPPS, SUBSEQ VISIT: HCPCS | Performed by: INTERNAL MEDICINE

## 2023-12-04 PROCEDURE — 1036F TOBACCO NON-USER: CPT | Performed by: INTERNAL MEDICINE

## 2023-12-04 PROCEDURE — 1170F FXNL STATUS ASSESSED: CPT | Performed by: INTERNAL MEDICINE

## 2023-12-04 PROCEDURE — 83036 HEMOGLOBIN GLYCOSYLATED A1C: CPT | Performed by: INTERNAL MEDICINE

## 2023-12-04 PROCEDURE — 99213 OFFICE O/P EST LOW 20 MIN: CPT | Performed by: INTERNAL MEDICINE

## 2023-12-04 PROCEDURE — 3079F DIAST BP 80-89 MM HG: CPT | Performed by: INTERNAL MEDICINE

## 2023-12-04 PROCEDURE — 3075F SYST BP GE 130 - 139MM HG: CPT | Performed by: INTERNAL MEDICINE

## 2023-12-04 RX ORDER — CYANOCOBALAMIN 1000 UG/ML
1000 INJECTION, SOLUTION INTRAMUSCULAR; SUBCUTANEOUS
Qty: 3 ML | Refills: 3 | Status: SHIPPED | OUTPATIENT
Start: 2023-12-04 | End: 2024-12-03

## 2023-12-04 RX ORDER — INSULIN GLARGINE 100 [IU]/ML
45 INJECTION, SOLUTION SUBCUTANEOUS 2 TIMES DAILY
Qty: 45 ML | Refills: 3 | Status: SHIPPED | OUTPATIENT
Start: 2023-12-04 | End: 2024-05-02 | Stop reason: SDUPTHER

## 2023-12-04 RX ORDER — TIRZEPATIDE 2.5 MG/.5ML
2.5 INJECTION, SOLUTION SUBCUTANEOUS
Qty: 2 ML | Refills: 1 | Status: SHIPPED | OUTPATIENT
Start: 2023-12-04 | End: 2023-12-27 | Stop reason: DRUGHIGH

## 2023-12-04 ASSESSMENT — PATIENT HEALTH QUESTIONNAIRE - PHQ9
SUM OF ALL RESPONSES TO PHQ9 QUESTIONS 1 AND 2: 0
2. FEELING DOWN, DEPRESSED OR HOPELESS: NOT AT ALL
1. LITTLE INTEREST OR PLEASURE IN DOING THINGS: NOT AT ALL

## 2023-12-04 ASSESSMENT — ACTIVITIES OF DAILY LIVING (ADL)
GROCERY_SHOPPING: INDEPENDENT
BATHING: INDEPENDENT
MANAGING_FINANCES: INDEPENDENT
DRESSING: INDEPENDENT
DOING_HOUSEWORK: INDEPENDENT
TAKING_MEDICATION: INDEPENDENT

## 2023-12-04 NOTE — PROGRESS NOTES
"Subjective   Reason for Visit: Luba Cortez is an 78 y.o. female here for a Medicare Wellness visit.     Past Medical, Surgical, and Family History reviewed and updated in chart.         HPI  RYBELSUS NOT WORKING VERY PREDICTABLY.     TOOK 2 MONTHS ON 14 MG TO WORK, NOW BROUGHT BLOOD SUGAR DOWN     TAKE 90 UNITS LANTUS DAILY, TAKE HUMALOG 10 UNITS WITH MEALS     NOT LOST WEIGHT     DON'T EAT A LOT OF CARBS.       HAS HAD A SCLERAL TRANSPLANT RIGHT LATERAL EYE 7-8 YEARS AGO FOR SCLERAL RUPTURE THAT RESULTED FROM RADIATION APPLIED TO RIGHT EYE.  LATE CONSEQUENCES OF RADIATION ARE VESSEL RUPTURE IN THE EYE, AND MACULAR DEGENERATION        Patient Care Team:  Patient Care Team:  Sampson Sheth MD as PCP - General (Internal Medicine)  Sampson Sheth MD as PCP - [employer] PCP     Review of Systems   Constitutional:  Negative for chills, diaphoresis and fever.   Respiratory:  Negative for cough and shortness of breath.    Cardiovascular:  Negative for chest pain and leg swelling.   Gastrointestinal:  Negative for constipation, diarrhea, nausea and vomiting.   Endocrine:        PER HPI   Musculoskeletal:  Negative for joint swelling and myalgias.       Objective   Vitals:  Pulse 62   Resp 14   Ht 1.702 m (5' 7\")   Wt 91.2 kg (201 lb)   SpO2 99%   BMI 31.48 kg/m²       Physical Exam  Vitals reviewed.   Constitutional:       General: She is not in acute distress.     Appearance: She is obese. She is not ill-appearing.   Eyes:      Comments: RIGHT LATERAL SCLERA S/P SCLERAL TRANSPLANT   Cardiovascular:      Rate and Rhythm: Normal rate and regular rhythm.      Pulses: Normal pulses.      Heart sounds:      No gallop.   Pulmonary:      Breath sounds: Normal breath sounds. No wheezing, rhonchi or rales.   Abdominal:      General: Abdomen is flat. Bowel sounds are normal.      Palpations: Abdomen is soft.      Tenderness: There is no guarding or rebound.   Musculoskeletal:      Right lower " leg: No edema.      Left lower leg: No edema.         Assessment/Plan   Problem List Items Addressed This Visit       Diabetes mellitus, type 2 (CMS/[facility])    Relevant Medications    tirzepatide (Mounjaro) 2.5 mg/0.5 mL pen injector    Other Relevant Orders    POCT glycosylated hemoglobin (Hb A1C) manually resulted    Vitamin B 12 deficiency    Relevant Medications    cyanocobalamin (Vitamin B-12) 1,000 mcg/mL injection    Medicare annual wellness visit, subsequent - Primary     Other Visit Diagnoses       Controlled type 2 diabetes mellitus without complication, with long-term current use of insulin (CMS/[facility])        Relevant Medications    insulin glargine (Lantus Solostar U-100 Insulin) 100 unit/mL (3 mL) pen    Encounter for screening mammogram for breast cancer        Relevant Orders    BI mammo bilateral screening tomosynthesis    Routine general medical examination at health care facility              Patient Instructions    STOP RYBELSUS, AND START MOUNJARO 2.5 MG SQ WEEKLY    2.  PLEASE CALL IN 1 MONTH FOR DOSE INCREASE IF WELL TOLERATED    3.  REFILLS ON LANTUS AND B12 SENT IN    4.  LABS REVIEWWED LOOK GOOD/STABLE    5.  CHEST XRAY AND ECHOCARDIOGRAM FROM LAST VISIT LOOK GOOD, LIKELY NO CARDIAC CONTRIBUTION TO YOUR SHORT OF BREATH ISSUE, WHICH SEEMS TO BE A LITTLE BETTER WITH B12 ADMINSITRATION AND INHALER    6.  FOLLOW UP 4-6 MONTHS OTR AS NEEDED.

## 2023-12-04 NOTE — PATIENT INSTRUCTIONS
STOP RYBELSUS, AND START MOUNJARO 2.5 MG SQ WEEKLY    2.  PLEASE CALL IN 1 MONTH FOR DOSE INCREASE IF WELL TOLERATED    3.  REFILLS ON LANTUS AND B12 SENT IN    4.  LABS REVIEWWED LOOK GOOD/STABLE    5.  CHEST XRAY AND ECHOCARDIOGRAM FROM LAST VISIT LOOK GOOD, LIKELY NO CARDIAC CONTRIBUTION TO YOUR SHORT OF BREATH ISSUE, WHICH SEEMS TO BE A LITTLE BETTER WITH B12 ADMINSITRATION AND INHALER    6.  FOLLOW UP 4-6 MONTHS OTR AS NEEDED.

## 2023-12-04 NOTE — PROGRESS NOTES
Subjective   Reason for Visit: Luba Cortez is an 78 y.o. female here for a Medicare Wellness visit.   HAD FLU SHOT               HPI  RYBELSUS NOT WORKING VERY PREDICTABLY.    TOOK 2 MONTHS ON 14 MG TO WORK, NOW BROUGHT BLOOD SUGAR DOWN    TAKE 90 UNITS LANTUS DAILY, TAKE HUMALOG 10 UNITS WITH MEALS    NOT LOST WEIGHT    DON'T EAT A LOT OF CARBS.      HAS HAD A SCLERAL TRANSPLANT RIGHT LATERAL EYE 7-8 YEARS AGO FOR SCLERAL RUPTURE THAT RESULTED FROM RADIATION APPLIED TO RIGHT EYE.  LATE CONSEQUENCES OF RADIATION ARE VESSEL RUPTURE IN THE EYE, AND MACULAR DEGENERATION      Patient Care Team:  Sampson Sheth MD as PCP - General (Internal Medicine)  Sampson Sheth MD as PCP - [employer] PCP     Review of Systems    Objective   Vitals:  There were no vitals taken for this visit.      Physical Exam    Assessment/Plan   Problem List Items Addressed This Visit    None

## 2023-12-07 VITALS
RESPIRATION RATE: 14 BRPM | HEIGHT: 67 IN | WEIGHT: 201 LBS | BODY MASS INDEX: 31.55 KG/M2 | DIASTOLIC BLOOD PRESSURE: 80 MMHG | HEART RATE: 62 BPM | OXYGEN SATURATION: 99 % | SYSTOLIC BLOOD PRESSURE: 130 MMHG

## 2023-12-07 PROBLEM — Z00.00 MEDICARE ANNUAL WELLNESS VISIT, SUBSEQUENT: Status: ACTIVE | Noted: 2023-12-07

## 2023-12-07 ASSESSMENT — ENCOUNTER SYMPTOMS
ENDOCRINE COMMENTS: PER HPI
DIARRHEA: 0
FEVER: 0
CONSTIPATION: 0
SHORTNESS OF BREATH: 0
CHILLS: 0
VOMITING: 0
MYALGIAS: 0
JOINT SWELLING: 0
COUGH: 0
DIAPHORESIS: 0
NAUSEA: 0

## 2023-12-27 ENCOUNTER — TELEPHONE (OUTPATIENT)
Dept: PRIMARY CARE | Facility: CLINIC | Age: 78
End: 2023-12-27

## 2023-12-27 ENCOUNTER — OFFICE VISIT (OUTPATIENT)
Dept: OPHTHALMOLOGY | Facility: CLINIC | Age: 78
End: 2023-12-27
Payer: MEDICARE

## 2023-12-27 DIAGNOSIS — E11.3211 MILD NONPROLIFERATIVE DIABETIC RETINOPATHY OF RIGHT EYE WITH MACULAR EDEMA ASSOCIATED WITH TYPE 2 DIABETES MELLITUS (MULTI): ICD-10-CM

## 2023-12-27 DIAGNOSIS — H35.371 EPIRETINAL MEMBRANE (ERM) OF RIGHT EYE: Primary | ICD-10-CM

## 2023-12-27 DIAGNOSIS — E08.3311: ICD-10-CM

## 2023-12-27 DIAGNOSIS — C69.31: ICD-10-CM

## 2023-12-27 DIAGNOSIS — E11.69 TYPE 2 DIABETES MELLITUS WITH OTHER SPECIFIED COMPLICATION, WITHOUT LONG-TERM CURRENT USE OF INSULIN (MULTI): Primary | ICD-10-CM

## 2023-12-27 PROCEDURE — 92134 CPTRZ OPH DX IMG PST SGM RTA: CPT | Mod: BILATERAL PROCEDURE | Performed by: OPHTHALMOLOGY

## 2023-12-27 PROCEDURE — 67028 INJECTION EYE DRUG: CPT | Mod: RIGHT SIDE | Performed by: OPHTHALMOLOGY

## 2023-12-27 RX ORDER — TIRZEPATIDE 5 MG/.5ML
5 INJECTION, SOLUTION SUBCUTANEOUS
Qty: 2 ML | Refills: 1 | Status: SHIPPED | OUTPATIENT
Start: 2023-12-27 | End: 2024-03-21 | Stop reason: SDUPTHER

## 2023-12-27 ASSESSMENT — EXTERNAL EXAM - RIGHT EYE: OD_EXAM: NORMAL

## 2023-12-27 ASSESSMENT — TONOMETRY
IOP_METHOD: GOLDMANN APPLANATION
OD_IOP_MMHG: 10
OS_IOP_MMHG: 15

## 2023-12-27 ASSESSMENT — ENCOUNTER SYMPTOMS
MUSCULOSKELETAL NEGATIVE: 0
PSYCHIATRIC NEGATIVE: 0
RESPIRATORY NEGATIVE: 0
CONSTITUTIONAL NEGATIVE: 0
GASTROINTESTINAL NEGATIVE: 0
ALLERGIC/IMMUNOLOGIC NEGATIVE: 0
NEUROLOGICAL NEGATIVE: 0
ENDOCRINE NEGATIVE: 0
CARDIOVASCULAR NEGATIVE: 0
EYES NEGATIVE: 0
HEMATOLOGIC/LYMPHATIC NEGATIVE: 0

## 2023-12-27 ASSESSMENT — VISUAL ACUITY
OD_SC+: -1
METHOD: SNELLEN - LINEAR
OD_SC: 20/80
OS_SC: 20/25

## 2023-12-27 ASSESSMENT — PACHYMETRY
OD_CT(UM): 562
OS_CT(UM): 551

## 2023-12-27 ASSESSMENT — CUP TO DISC RATIO
OS_RATIO: 0.3
OD_RATIO: 0.6

## 2023-12-27 ASSESSMENT — SLIT LAMP EXAM - LIDS
COMMENTS: NORMAL
COMMENTS: NORMAL

## 2023-12-27 ASSESSMENT — EXTERNAL EXAM - LEFT EYE: OS_EXAM: NORMAL

## 2023-12-27 NOTE — TELEPHONE ENCOUNTER
Pt was supposed to call you to let you know status of the mounjaro after 1 month.  She wanted you to know that it is working and she is not having any issues.

## 2023-12-27 NOTE — PROGRESS NOTES
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 associated with diabetes mellitus due to underlying condition (CMS/HCC)      Impression    1 E11.3311 Controlled type 2 diabetes mellitus with right eye affected by moderate nonproliferative retinopathy and macular edema-Worsening  2 H43.11 Vitreous hemorrhage of right eye-Worsening  3 H40.053 Ocular hypertension, bilateral-Stable  4 T66.XXXA Radiation retinopathy-Improving  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  -appears stable  -Last CT abd pelvis 9/2017: no liver metastasis- small nodule that has not changed.    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 Healthy lifestyle.       4) OHTN OU, glaucoma suspect  -continue FU and gtts per Dr. Lim.     5 today has DME OD       Treatment options for DME OD  discussed, including observation, anti-VEGF injections (including Avastin, Lucentis,   Eylea  Vabysmo and  Beovu ), and  laser. Recommend anti-VEGF injections. Eylea done OD today in a sterile manner with Betadine 5% for antisepsis.

## 2024-01-05 ENCOUNTER — TELEPHONE (OUTPATIENT)
Dept: PRIMARY CARE | Facility: CLINIC | Age: 79
End: 2024-01-05
Payer: MEDICARE

## 2024-01-05 DIAGNOSIS — E03.9 ACQUIRED HYPOTHYROIDISM: Primary | ICD-10-CM

## 2024-01-05 DIAGNOSIS — E78.49 OTHER HYPERLIPIDEMIA: ICD-10-CM

## 2024-01-05 RX ORDER — LEVOTHYROXINE SODIUM 125 UG/1
125 TABLET ORAL DAILY
Qty: 90 TABLET | Refills: 3 | Status: SHIPPED | OUTPATIENT
Start: 2024-01-05 | End: 2024-06-05 | Stop reason: DRUGHIGH

## 2024-01-05 RX ORDER — ATORVASTATIN CALCIUM 10 MG/1
10 TABLET, FILM COATED ORAL DAILY
Qty: 90 TABLET | Refills: 3 | Status: SHIPPED | OUTPATIENT
Start: 2024-01-05

## 2024-01-05 NOTE — TELEPHONE ENCOUNTER
Refill 90 day supply w/Refills  to [facility]    -atorvastatin (Lipitor) 10 mg tablet 1XDAY  -levothyroxine (Synthroid, Levoxyl) 125 mcg tablet 1XDAY

## 2024-01-09 ENCOUNTER — TELEPHONE (OUTPATIENT)
Dept: PRIMARY CARE | Facility: CLINIC | Age: 79
End: 2024-01-09
Payer: MEDICARE

## 2024-01-09 DIAGNOSIS — E11.9 TYPE 2 DIABETES MELLITUS WITHOUT COMPLICATION, WITHOUT LONG-TERM CURRENT USE OF INSULIN (MULTI): ICD-10-CM

## 2024-01-09 RX ORDER — METFORMIN HYDROCHLORIDE 1000 MG/1
1000 TABLET ORAL
Qty: 180 TABLET | Refills: 1 | Status: SHIPPED | OUTPATIENT
Start: 2024-01-09

## 2024-02-19 ENCOUNTER — OFFICE VISIT (OUTPATIENT)
Dept: OPHTHALMOLOGY | Facility: CLINIC | Age: 79
End: 2024-02-19
Payer: MEDICARE

## 2024-02-19 DIAGNOSIS — E11.3211 MILD NONPROLIFERATIVE DIABETIC RETINOPATHY OF RIGHT EYE WITH MACULAR EDEMA ASSOCIATED WITH TYPE 2 DIABETES MELLITUS (MULTI): ICD-10-CM

## 2024-02-19 DIAGNOSIS — H35.371 EPIRETINAL MEMBRANE (ERM) OF RIGHT EYE: Primary | ICD-10-CM

## 2024-02-19 PROCEDURE — 67028 INJECTION EYE DRUG: CPT | Mod: RIGHT SIDE | Performed by: OPHTHALMOLOGY

## 2024-02-19 PROCEDURE — 92134 CPTRZ OPH DX IMG PST SGM RTA: CPT | Performed by: OPHTHALMOLOGY

## 2024-02-19 ASSESSMENT — TONOMETRY
OD_IOP_MMHG: 10
OS_IOP_MMHG: 13
IOP_METHOD: GOLDMANN APPLANATION

## 2024-02-19 ASSESSMENT — SLIT LAMP EXAM - LIDS
COMMENTS: NORMAL
COMMENTS: NORMAL

## 2024-02-19 ASSESSMENT — CONF VISUAL FIELD
OS_SUPERIOR_NASAL_RESTRICTION: 0
OD_NORMAL: 1
OD_INFERIOR_NASAL_RESTRICTION: 0
OS_NORMAL: 1
OS_INFERIOR_NASAL_RESTRICTION: 0
OS_INFERIOR_TEMPORAL_RESTRICTION: 0
OD_SUPERIOR_NASAL_RESTRICTION: 0
OS_SUPERIOR_TEMPORAL_RESTRICTION: 0
OD_SUPERIOR_TEMPORAL_RESTRICTION: 0
OD_INFERIOR_TEMPORAL_RESTRICTION: 0

## 2024-02-19 ASSESSMENT — CUP TO DISC RATIO
OS_RATIO: 0.3
OD_RATIO: 0.6

## 2024-02-19 ASSESSMENT — VISUAL ACUITY
OS_SC: 20/25+1
OD_SC: 20/70-2
METHOD: SNELLEN - LINEAR

## 2024-02-19 ASSESSMENT — EXTERNAL EXAM - LEFT EYE: OS_EXAM: NORMAL

## 2024-02-19 ASSESSMENT — PACHYMETRY
OD_CT(UM): 562
OS_CT(UM): 551

## 2024-02-19 ASSESSMENT — ENCOUNTER SYMPTOMS: EYES NEGATIVE: 1

## 2024-02-19 ASSESSMENT — EXTERNAL EXAM - RIGHT EYE: OD_EXAM: NORMAL

## 2024-02-19 NOTE — PROGRESS NOTES
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  -appears stable  -Last CT abd pelvis 9/2017: no liver metastasis- small nodule that has not changed.    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 Healthy lifestyle.       4) OHTN OU, glaucoma suspect  -continue FU and gtts per Dr. Lim.     5 today has DME OD       Treatment options for DME OD  discussed, including observation, anti-VEGF injections (including Avastin, Lucentis,   Eylea  Vabysmo and  Beovu ), and  laser. Recommend anti-VEGF injections. Eylea done OD today in a sterile manner with Betadine 5% for antisepsis.

## 2024-03-21 ENCOUNTER — TELEPHONE (OUTPATIENT)
Dept: PRIMARY CARE | Facility: CLINIC | Age: 79
End: 2024-03-21
Payer: MEDICARE

## 2024-03-21 DIAGNOSIS — E11.69 TYPE 2 DIABETES MELLITUS WITH OTHER SPECIFIED COMPLICATION, WITHOUT LONG-TERM CURRENT USE OF INSULIN (MULTI): ICD-10-CM

## 2024-03-21 DIAGNOSIS — I10 HYPERTENSION, UNSPECIFIED TYPE: ICD-10-CM

## 2024-03-21 DIAGNOSIS — Z00.00 HEALTHCARE MAINTENANCE: ICD-10-CM

## 2024-03-21 RX ORDER — VALSARTAN 320 MG/1
320 TABLET ORAL DAILY
Qty: 30 TABLET | Refills: 2 | Status: SHIPPED | OUTPATIENT
Start: 2024-03-21 | End: 2024-04-24

## 2024-03-21 RX ORDER — TAMOXIFEN CITRATE 20 MG/1
20 TABLET ORAL DAILY
Qty: 90 TABLET | Refills: 1 | Status: SHIPPED | OUTPATIENT
Start: 2024-03-21

## 2024-03-21 RX ORDER — TIRZEPATIDE 5 MG/.5ML
5 INJECTION, SOLUTION SUBCUTANEOUS
Qty: 2 ML | Refills: 1 | Status: SHIPPED | OUTPATIENT
Start: 2024-03-21 | End: 2024-05-02 | Stop reason: DRUGHIGH

## 2024-03-21 NOTE — TELEPHONE ENCOUNTER
Refills 90 Day Supply:     -valsartan (Diovan) 320 mg tablet   -tirzepatide (Mounjaro) 5 mg/0.5 mL pen injector   -tamoxifen (Nolvadex) 20 mg tablet     [facility]

## 2024-03-26 DIAGNOSIS — E11.69 TYPE 2 DIABETES MELLITUS WITH OTHER SPECIFIED COMPLICATION, WITH LONG-TERM CURRENT USE OF INSULIN (MULTI): Primary | ICD-10-CM

## 2024-03-26 DIAGNOSIS — Z79.4 TYPE 2 DIABETES MELLITUS WITH OTHER SPECIFIED COMPLICATION, WITH LONG-TERM CURRENT USE OF INSULIN (MULTI): Primary | ICD-10-CM

## 2024-04-15 ENCOUNTER — TELEMEDICINE (OUTPATIENT)
Dept: PHARMACY | Facility: HOSPITAL | Age: 79
End: 2024-04-15
Payer: MEDICARE

## 2024-04-15 DIAGNOSIS — E11.69 TYPE 2 DIABETES MELLITUS WITH OTHER SPECIFIED COMPLICATION, WITH LONG-TERM CURRENT USE OF INSULIN (MULTI): ICD-10-CM

## 2024-04-15 DIAGNOSIS — Z79.4 TYPE 2 DIABETES MELLITUS WITH OTHER SPECIFIED COMPLICATION, WITH LONG-TERM CURRENT USE OF INSULIN (MULTI): ICD-10-CM

## 2024-04-15 NOTE — PROGRESS NOTES
[facility] Pharmacist Visit - Diabetes Management  Luba Cortez is a 78 y.o. female was referred to Clinical Pharmacy Team for No chief complaint on file..    Referring Provider: Sampson Sheth, *  PCP: Sampson Sheth MD - last visit: 12/4/23, next visit: 6/4/24    Subjective   HPI  PMH significant for T2DM, gastroparesis, HTN, HLD, hypothyroid, GERD, glaucoma, retinopathy.  Patient has no known history of medullary thyroid cancer, pancreatitis, or complicated UTI.  Known DM complications include retinopathy.  Special needs/barriers to therapy: High medication costs      DIABETES ASSESSMENT  Medication Therapy  Current Medications: metformin 1,00mg twice daily, Lantus 45 units twice daily, Humalog SS with meals (patient reports typically 9-10 units), Mounjaro 5mg once weekly  Previous Medications: Rybelsus (ineffective)    Adherence: Takes medication as directed and reports no missed doses  Adverse Effects: None    Risk Reducing Meds  On GLP1-RA: Yes  Established for several months, no exacerbation of gastroparesis  On SGLT2i: No   On ACEi/ARB: Yes   On Statin: Yes     Glucose Monitoring  Glucometer/CGM Type: Relion (OTC)    Previous home BG readings: None  Current home BG readings:  Date FBG and PPBG   Past week 125    125    127    135    136    98    92    125    96    92    137    95    166    87    65    78        Hypoglycemia:  Occasionally experiences hypoglycemia with dizziness and stomach upset. Treats appropriately.  Hyperglycemia: Denies signs and symptoms    Diabetic Eye Exam:  Following with opthalmology  Monofilament Foot Exam: Needs completed, last unknown       Patient Assistance Screening (VAF)  Patient verbally reports monthly or yearly income which is less than 400% federal poverty level. Patient will be asked to provide proof of income with Social Security Benefit Statement(s) and Statement of Non-Filing.    Application for program will be submitted for the following  "medications: Mounjaro, Lantus, Humalog    Patient has been informed that program team will be reaching out to them to discuss necessary documentation, instructed to answer phone/return voicemail. Patient aware this process may take up to 6 weeks. If approved, medication must be filled through [facility] pharmacy and may be picked up or mailed to patient.       Secondary Prevention  ASCVD Risk:   The ASCVD Risk score (João DAVIS, et al., 2019) failed to calculate for the following reasons:    The valid total cholesterol range is 130 to 320 mg/dL  On Statin?: Yes, LDL at goal    Immunizations Needed: RSV  Tobacco Use: former smoker, quit 2011       Objective   Vitals  BP Readings from Last 2 Encounters:   12/07/23 130/80   09/06/23 128/84     BMI Readings from Last 1 Encounters:   12/04/23 31.48 kg/m²      Labs  A1C  Lab Results   Component Value Date    HGBA1C 8.4 (A) 09/06/2023    HGBA1C 6.6 05/06/2021    HGBA1C 7.7 10/09/2019     BMP  Lab Results   Component Value Date    CALCIUM 9.9 09/12/2023     09/12/2023    K 4.5 09/12/2023    CO2 27 09/12/2023     (H) 09/12/2023    BUN 26 (H) 09/12/2023    CREATININE 1.14 (H) 09/12/2023    GFRF 50 (A) 09/12/2023     LFTs  Lab Results   Component Value Date    ALT 12 09/12/2023    AST 16 09/12/2023    ALKPHOS 65 09/12/2023    BILITOT 0.5 09/12/2023     FLP  Lab Results   Component Value Date    TRIG 163 (H) 09/12/2023    CHOL 104 09/12/2023    LDLF 43 09/12/2023    HDL 28.0 (A) 09/12/2023     Urine Microalbumin  No results found for: \"MICROALBCREA\"  Vitamin B12  Lab Results   Component Value Date    [id number] 158 (L) 09/12/2023         Assessment/Plan   Problem List Items Addressed This Visit       Diabetes mellitus, type 2 (Multi)     Diabetes: Uncontrolled with last A1c 8.4% on 9/6/23. Due for updated A1c. Current regimen includes metformin 1,00mg twice daily, Lantus 45 units twice daily, Humalog SS with meals, and Mounjaro 5mg once weekly. Tolerating current " regimen well. Home BG readings average  over past week. Occasional hypoglycemia, treated appropriately. Due to BG readings at goal, plan to continue current regimen. Sree GRAY approved. Patient likely qualifies for  PAP, pharmacist will facilitate enrollment. Patient is eligible for and interested in CGM, and is currently purchasing testing supplies OTC. Will provide orders for glucometer testing supplies and CGM to [facility].  Continue taking metformin 1,00mg twice daily, Lantus 45 units twice daily, Humalog SS with meals, and Mounjaro 5mg once weekly  Continue to monitor and record BG daily. Orders sent for CGM and traditional (fingerstick) testing supplies  Patient to follow emailed instructions for  PAP enrollment.         Relevant Orders    Follow Up In [facility] - Pharmacy     Patient Education:  Counseled patient on relevant MOA, expectations, side effects, duration of therapy, contraindications, administration, and monitoring parameters.  All questions and concerns addressed. Contact pharmacist with any further questions or concerns prior to next appointment.  Reviewed BG goals: Fasting BG goal , Postprandial BG goal <180 mg/dL, A1C goal <7%  Reviewed signs, symptoms, and treatment of hypoglycemia.    Clinical Pharmacist follow-up: 5/14/24 at 2:30pm, Telehealth visit    Sly GarciaD  Clinical Pharmacist  04/15/24    Continue all meds under the continuation of care with the referring provider and clinical pharmacy team.  Verbal consent to manage patient's drug therapy was obtained from the patient. They were informed they may decline to participate or withdraw from participation in pharmacy services at any time.

## 2024-04-24 ENCOUNTER — OFFICE VISIT (OUTPATIENT)
Dept: OPHTHALMOLOGY | Facility: CLINIC | Age: 79
End: 2024-04-24
Payer: MEDICARE

## 2024-04-24 DIAGNOSIS — I10 HYPERTENSION, UNSPECIFIED TYPE: ICD-10-CM

## 2024-04-24 DIAGNOSIS — E11.3211 MILD NONPROLIFERATIVE DIABETIC RETINOPATHY OF RIGHT EYE WITH MACULAR EDEMA ASSOCIATED WITH TYPE 2 DIABETES MELLITUS (MULTI): Primary | ICD-10-CM

## 2024-04-24 DIAGNOSIS — H43.11 VITREOUS HEMORRHAGE OF RIGHT EYE (MULTI): ICD-10-CM

## 2024-04-24 DIAGNOSIS — E09.3311: ICD-10-CM

## 2024-04-24 DIAGNOSIS — H35.89 POST-RADIATION RETINOPATHY, SUBSEQUENT ENCOUNTER: ICD-10-CM

## 2024-04-24 DIAGNOSIS — T66.XXXD POST-RADIATION RETINOPATHY, SUBSEQUENT ENCOUNTER: ICD-10-CM

## 2024-04-24 DIAGNOSIS — M35.00 SICCA SYNDROME (MULTI): ICD-10-CM

## 2024-04-24 PROCEDURE — 92134 CPTRZ OPH DX IMG PST SGM RTA: CPT | Mod: BILATERAL PROCEDURE | Performed by: OPHTHALMOLOGY

## 2024-04-24 PROCEDURE — 67028 INJECTION EYE DRUG: CPT | Mod: RIGHT SIDE | Performed by: OPHTHALMOLOGY

## 2024-04-24 RX ORDER — VALSARTAN 320 MG/1
320 TABLET ORAL DAILY
Qty: 90 TABLET | Refills: 3 | Status: SHIPPED | OUTPATIENT
Start: 2024-04-24

## 2024-04-24 ASSESSMENT — ENCOUNTER SYMPTOMS
ENDOCRINE NEGATIVE: 0
EYES NEGATIVE: 0
HEMATOLOGIC/LYMPHATIC NEGATIVE: 0
CONSTITUTIONAL NEGATIVE: 0
MUSCULOSKELETAL NEGATIVE: 0
NEUROLOGICAL NEGATIVE: 0
GASTROINTESTINAL NEGATIVE: 0
RESPIRATORY NEGATIVE: 0
ALLERGIC/IMMUNOLOGIC NEGATIVE: 0
PSYCHIATRIC NEGATIVE: 0
CARDIOVASCULAR NEGATIVE: 0

## 2024-04-24 ASSESSMENT — PACHYMETRY
OS_CT(UM): 551
OD_CT(UM): 562

## 2024-04-24 ASSESSMENT — EXTERNAL EXAM - RIGHT EYE: OD_EXAM: NORMAL

## 2024-04-24 ASSESSMENT — SLIT LAMP EXAM - LIDS
COMMENTS: NORMAL
COMMENTS: NORMAL

## 2024-04-24 ASSESSMENT — TONOMETRY
IOP_METHOD: GOLDMANN APPLANATION
OD_IOP_MMHG: 14
OS_IOP_MMHG: 14

## 2024-04-24 ASSESSMENT — EXTERNAL EXAM - LEFT EYE: OS_EXAM: NORMAL

## 2024-04-24 ASSESSMENT — VISUAL ACUITY
METHOD: SNELLEN - LINEAR
OS_SC: 20/25
OD_SC: 20/80

## 2024-04-24 ASSESSMENT — CONF VISUAL FIELD
OS_SUPERIOR_NASAL_RESTRICTION: 0
OS_NORMAL: 1
OD_SUPERIOR_NASAL_RESTRICTION: 0
OS_INFERIOR_TEMPORAL_RESTRICTION: 0
OD_INFERIOR_TEMPORAL_RESTRICTION: 0
OS_INFERIOR_NASAL_RESTRICTION: 0
OD_NORMAL: 1
OS_SUPERIOR_TEMPORAL_RESTRICTION: 0
OD_SUPERIOR_TEMPORAL_RESTRICTION: 0
OD_INFERIOR_NASAL_RESTRICTION: 0

## 2024-04-24 ASSESSMENT — CUP TO DISC RATIO
OD_RATIO: 0.6
OS_RATIO: 0.3

## 2024-04-24 NOTE — PROGRESS NOTES
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 (Multi)  -     OCT, Retina - OU - Both Eyes  Vitreous hemorrhage of right eye (Multi)  Sicca syndrome (Multi)  Post-radiation retinopathy, subsequent encounter  Right eye affected by moderate nonproliferative diabetic retinopathy and macular edema, associated with drug or chemical induced diabetes mellitus (Multi)      Impression    1 E11.3311 Controlled type 2 diabetes mellitus with right eye affected by moderate nonproliferative retinopathy and macular edema-Worsening  2 H43.11 Vitreous hemorrhage of right eye-Worsening  3 H40.053 Ocular hypertension, bilateral-Stable  4 T66.XXXA Radiation retinopathy-Improving  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  -appears stable  -Last CT abd pelvis 9/2017: no liver metastasis- small nodule that has not changed.    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 Healthy lifestyle.       4) OHTN OU, glaucoma suspect  -continue FU and gtts per Dr. Lim.     5 today has DME OD       TREAT RIGHT    Treatment options for DME OD discussed, including observation, anti-VEGF injections  (including Avastin, Lucentis, Beovu, Vabysmo and Eylea) and laser. Recommend anti-VEGF injections. Eylea done OD today in a sterile manner with Betadine 5% for antisepsis.

## 2024-05-01 DIAGNOSIS — E11.69 TYPE 2 DIABETES MELLITUS WITH OTHER SPECIFIED COMPLICATION, WITHOUT LONG-TERM CURRENT USE OF INSULIN (MULTI): ICD-10-CM

## 2024-05-01 DIAGNOSIS — E11.9 CONTROLLED TYPE 2 DIABETES MELLITUS WITHOUT COMPLICATION, WITH LONG-TERM CURRENT USE OF INSULIN (MULTI): ICD-10-CM

## 2024-05-01 DIAGNOSIS — Z79.4 CONTROLLED TYPE 2 DIABETES MELLITUS WITHOUT COMPLICATION, WITH LONG-TERM CURRENT USE OF INSULIN (MULTI): ICD-10-CM

## 2024-05-01 DIAGNOSIS — E78.1 HYPERTRIGLYCERIDEMIA: ICD-10-CM

## 2024-05-02 DIAGNOSIS — E11.9 CONTROLLED TYPE 2 DIABETES MELLITUS WITHOUT COMPLICATION, WITH LONG-TERM CURRENT USE OF INSULIN (MULTI): ICD-10-CM

## 2024-05-02 DIAGNOSIS — E11.69 TYPE 2 DIABETES MELLITUS WITH OTHER SPECIFIED COMPLICATION, WITHOUT LONG-TERM CURRENT USE OF INSULIN (MULTI): ICD-10-CM

## 2024-05-02 DIAGNOSIS — Z79.4 CONTROLLED TYPE 2 DIABETES MELLITUS WITHOUT COMPLICATION, WITH LONG-TERM CURRENT USE OF INSULIN (MULTI): ICD-10-CM

## 2024-05-02 DIAGNOSIS — E11.69 TYPE 2 DIABETES MELLITUS WITH OTHER SPECIFIED COMPLICATION, WITH LONG-TERM CURRENT USE OF INSULIN (MULTI): ICD-10-CM

## 2024-05-02 DIAGNOSIS — Z79.4 TYPE 2 DIABETES MELLITUS WITH OTHER SPECIFIED COMPLICATION, WITH LONG-TERM CURRENT USE OF INSULIN (MULTI): ICD-10-CM

## 2024-05-02 RX ORDER — INSULIN LISPRO 100 [IU]/ML
INJECTION, SOLUTION INTRAVENOUS; SUBCUTANEOUS
Qty: 15 ML | Refills: 11 | Status: SHIPPED | OUTPATIENT
Start: 2024-05-02 | End: 2024-05-29 | Stop reason: ALTCHOICE

## 2024-05-02 RX ORDER — INSULIN GLARGINE 100 [IU]/ML
INJECTION, SOLUTION SUBCUTANEOUS
Qty: 45 ML | Refills: 3 | OUTPATIENT
Start: 2024-05-02

## 2024-05-02 RX ORDER — TIRZEPATIDE 7.5 MG/.5ML
7.5 INJECTION, SOLUTION SUBCUTANEOUS
Qty: 2 ML | Refills: 0 | Status: SHIPPED | OUTPATIENT
Start: 2024-05-02 | End: 2024-05-03 | Stop reason: DRUGHIGH

## 2024-05-02 RX ORDER — TIRZEPATIDE 5 MG/.5ML
5 INJECTION, SOLUTION SUBCUTANEOUS
Qty: 2 ML | Refills: 3 | OUTPATIENT
Start: 2024-05-02

## 2024-05-02 RX ORDER — INSULIN GLARGINE 100 [IU]/ML
45 INJECTION, SOLUTION SUBCUTANEOUS 2 TIMES DAILY
Qty: 45 ML | Refills: 6 | Status: SHIPPED | OUTPATIENT
Start: 2024-05-02 | End: 2025-05-02

## 2024-05-03 DIAGNOSIS — E11.69 TYPE 2 DIABETES MELLITUS WITH OTHER SPECIFIED COMPLICATION, WITHOUT LONG-TERM CURRENT USE OF INSULIN (MULTI): ICD-10-CM

## 2024-05-03 RX ORDER — TIRZEPATIDE 5 MG/.5ML
5 INJECTION, SOLUTION SUBCUTANEOUS
Qty: 2 ML | Refills: 1 | Status: SHIPPED | OUTPATIENT
Start: 2024-05-05

## 2024-05-06 RX ORDER — FENOFIBRATE 145 MG/1
145 TABLET, FILM COATED ORAL DAILY
Qty: 90 TABLET | Refills: 3 | Status: SHIPPED | OUTPATIENT
Start: 2024-05-06

## 2024-05-07 ENCOUNTER — DOCUMENTATION (OUTPATIENT)
Dept: PHARMACY | Facility: HOSPITAL | Age: 79
End: 2024-05-07
Payer: MEDICARE

## 2024-05-07 NOTE — PROGRESS NOTES
PAP Approval  Luba Cortez is a 78 y.o. female who was referred to the Clinical Pharmacy Team by Sampson Sheth MD. The patient has been approved for  Patient Assistance Program for Mounaro. Humalog and Lantus will be added when due for refill. Patient has been contacted on the status of the approval. Provided the patient with the [facility] phone number, and made patient aware that they will be hearing from someone at [facility] to set up delivery for the prescriptions. Patient will be contacted each month to set up a delivery date prior to deliver of the medication. Discussed with the patient that if they have not heard from the pharmacy and are in need of medication, they can call the pharmacy to set this up.    Approval Duration: 5/3/2024 - 5/3/2025    Please reach out to the Clinical Pharmacy Team if there are any further questions.     Continue all meds under the continuation of care with the referring provider and clinical pharmacy team.  Verbal consent to manage patient's drug therapy was obtained from the patient/caregiver. They were informed they may decline to participate or withdraw from participation in pharmacy services at any time.    Sola Alcaraz, PharmD  Clinical Pharmacist  [phone]  05/07/24

## 2024-05-13 PROCEDURE — RXMED WILLOW AMBULATORY MEDICATION CHARGE

## 2024-05-14 ENCOUNTER — TELEMEDICINE (OUTPATIENT)
Dept: PHARMACY | Facility: HOSPITAL | Age: 79
End: 2024-05-14
Payer: MEDICARE

## 2024-05-14 DIAGNOSIS — Z79.4 TYPE 2 DIABETES MELLITUS WITH OTHER SPECIFIED COMPLICATION, WITH LONG-TERM CURRENT USE OF INSULIN (MULTI): ICD-10-CM

## 2024-05-14 DIAGNOSIS — E11.69 TYPE 2 DIABETES MELLITUS WITH OTHER SPECIFIED COMPLICATION, WITH LONG-TERM CURRENT USE OF INSULIN (MULTI): ICD-10-CM

## 2024-05-14 NOTE — PROGRESS NOTES
[facility] Pharmacist Visit - Diabetes Management  Luba Cortez is a 78 y.o. female was referred to Clinical Pharmacy Team for Diabetes.    Referring Provider: Sampson Sheth, *  PCP: Sampson Sheth MD - last visit: 12/4/23, next visit: 6/4/24    Subjective   HPI  PMH significant for T2DM, gastroparesis, HTN, HLD, hypothyroid, GERD, glaucoma, retinopathy.  Patient has no known history of medullary thyroid cancer, pancreatitis, or complicated UTI.  Known DM complications include retinopathy.  Special needs/barriers to therapy:  PAP      DIABETES ASSESSMENT  Medication Therapy  Current Medications: metformin 1,00mg twice daily, Lantus 45 units twice daily, Humalog SS with meals (patient reports typically 9-10 units), Mounjaro 5mg once weekly  Previous Medications: Rybelsus (ineffective)    Adherence: Takes medication as directed and reports no missed doses  Adverse Effects: None    Risk Reducing Meds  On GLP1-RA: Yes  Established for several months, no exacerbation of gastroparesis  On SGLT2i: No   On ACEi/ARB: Yes   On Statin: Yes     Glucose Monitoring  Glucometer/CGM Type: Relion (OTC) - Received Jasper 3, would like-in person education    Previous home BG readings: (4/15/24) Avg   Current home BG readings: , 121, 98    Hypoglycemia: Experienced 1 hypoglycemic events (BG 70) due to not eating enough, treated appropriately  Hyperglycemia: Denies signs and symptoms    Diabetic Eye Exam:  Following with opthalmology  Monofilament Foot Exam: Needs completed, last unknown      Secondary Prevention  ASCVD Risk:   The ASCVD Risk score (João DK, et al., 2019) failed to calculate for the following reasons:    The valid total cholesterol range is 130 to 320 mg/dL  On Statin?: Yes, LDL at goal    Immunizations Needed: All up-to-date and documented  Tobacco Use: former smoker, quit 2011       Objective   Vitals  BP Readings from Last 2 Encounters:   12/07/23 130/80   09/06/23 128/84     BMI Readings  "from Last 1 Encounters:   12/04/23 31.48 kg/m²      Labs  A1C  Lab Results   Component Value Date    HGBA1C 8.4 (A) 09/06/2023    HGBA1C 6.6 05/06/2021    HGBA1C 7.7 10/09/2019     BMP  Lab Results   Component Value Date    CALCIUM 9.9 09/12/2023     09/12/2023    K 4.5 09/12/2023    CO2 27 09/12/2023     (H) 09/12/2023    BUN 26 (H) 09/12/2023    CREATININE 1.14 (H) 09/12/2023    GFRF 50 (A) 09/12/2023     LFTs  Lab Results   Component Value Date    ALT 12 09/12/2023    AST 16 09/12/2023    ALKPHOS 65 09/12/2023    BILITOT 0.5 09/12/2023     FLP  Lab Results   Component Value Date    TRIG 163 (H) 09/12/2023    CHOL 104 09/12/2023    LDLF 43 09/12/2023    HDL 28.0 (A) 09/12/2023     Urine Microalbumin  No results found for: \"MICROALBCREA\"  Vitamin B12  Lab Results   Component Value Date    [id number] 158 (L) 09/12/2023         Assessment/Plan   Problem List Items Addressed This Visit       Diabetes mellitus, type 2 (Multi)     Diabetes: Uncontrolled with last A1c 8.4% on 9/6/23. Due for updated A1c. Current regimen includes metformin 1,00mg twice daily, Lantus 45 units twice daily, Humalog SS with meals, and Mounjaro 5mg once weekly. Home BG readings reported as 's-120's. Patient received [employer] 3 system but has not yet used, scheduling in-person training. Due to BG well-controlled, plan to continue current regimen at this time. Will discuss medication optimization when able to monitor consistently with Jasper.    Future: Consider addition of SGLT2-i for glucose-lowering and renal benefits. Down-titrate or discontinue Humalog to maintain BG in goal range. May also consider further titration of Mounjaro.         Relevant Orders    Follow Up In Advanced Primary Care - Pharmacy     Patient Education:  Counseled patient on relevant MOA, expectations, side effects, duration of therapy, contraindications, administration, and monitoring parameters.  All questions and concerns addressed. Contact pharmacist " with any further questions or concerns prior to next appointment.  Reviewed BG goals: Fasting BG goal , Postprandial BG goal <180 mg/dL, A1C goal <7%  Reviewed signs, symptoms, and treatment of hypoglycemia.    Clinical Pharmacist follow-up: 5/16/24 at 11:00pm, In-Person visit    Sly GarciaD  Clinical Pharmacist  05/14/24    Continue all meds under the continuation of care with the referring provider and clinical pharmacy team.  Verbal consent to manage patient's drug therapy was obtained from the patient. They were informed they may decline to participate or withdraw from participation in pharmacy services at any time.

## 2024-05-14 NOTE — ASSESSMENT & PLAN NOTE
Diabetes: Uncontrolled with last A1c 8.4% on 9/6/23. Due for updated A1c. Current regimen includes metformin 1,00mg twice daily, Lantus 45 units twice daily, Humalog SS with meals, and Mounjaro 5mg once weekly. Home BG readings reported as 's-120's. Patient received Jasper 3 system but has not yet used, scheduling in-person training. Due to BG well-controlled, plan to continue current regimen at this time. Will discuss medication optimization when able to monitor consistently with Jasper.    Future: Consider addition of SGLT2-i for glucose-lowering and renal benefits. Down-titrate or discontinue Humalog to maintain BG in goal range. May also consider further titration of Mounjaro.

## 2024-05-16 ENCOUNTER — PHARMACY VISIT (OUTPATIENT)
Dept: PHARMACY | Facility: CLINIC | Age: 79
End: 2024-05-16
Payer: COMMERCIAL

## 2024-05-16 ENCOUNTER — CLINICAL SUPPORT (OUTPATIENT)
Dept: PRIMARY CARE | Facility: CLINIC | Age: 79
End: 2024-05-16
Payer: MEDICARE

## 2024-05-16 DIAGNOSIS — Z79.4 TYPE 2 DIABETES MELLITUS WITH OTHER SPECIFIED COMPLICATION, WITH LONG-TERM CURRENT USE OF INSULIN (MULTI): ICD-10-CM

## 2024-05-16 DIAGNOSIS — E11.69 TYPE 2 DIABETES MELLITUS WITH OTHER SPECIFIED COMPLICATION, WITH LONG-TERM CURRENT USE OF INSULIN (MULTI): ICD-10-CM

## 2024-05-16 PROCEDURE — RXMED WILLOW AMBULATORY MEDICATION CHARGE

## 2024-05-16 NOTE — ASSESSMENT & PLAN NOTE
Diabetes: Uncontrolled with last A1c 8.4% on 9/6/23. Due for updated A1c. Current regimen includes metformin 1,00mg twice daily, Lantus 45 units twice daily, Humalog SS with meals, and Mounjaro 5mg once weekly. Patient presents today for education and training on use of Jasper 3 system. Patient was able to successfully place and connect first sensor today. Further discussed addition of SGLT2i for glucose control and renal benefits. Patient agreeable, medication counseling provided. Plan to start Jardiance 10mg once daily. Patient to begin using Humalog according to SS (2+2:50>150) when Jardiance is started to avoid hypoglycemia.  Starting taking Jardiance 10mg once daily as directed.  When Jardiance is started, change Humalog to 2 units + SS(2:50>150) with meals.  Patient provided with written copy of sliding scale  Continue taking metformin 1,00mg twice daily, Lantus 45 units twice daily, and Mounjaro 5mg once weekly.  Start to monitor BG with Jasper 3 system.

## 2024-05-16 NOTE — PROGRESS NOTES
[facility] Pharmacist Visit - Diabetes Management  Luba Cortez is a 78 y.o. female was referred to Clinical Pharmacy Team for Diabetes.    Referring Provider: Sampson Sheth, *  PCP: Sampson Sheth MD - last visit: 12/4/23, next visit: 6/4/24    Subjective   HPI  PMH significant for T2DM, gastroparesis, HTN, HLD, hypothyroid, GERD, glaucoma, retinopathy.  Patient has no known history of medullary thyroid cancer, pancreatitis, or complicated UTI.  Known DM complications include retinopathy.  Special needs/barriers to therapy:  PAP      DIABETES ASSESSMENT  Medication Therapy  Current Medications: metformin 1,00mg twice daily, Lantus 45 units twice daily, Humalog SS with meals (patient reports typically 9-10 units), Mounjaro 5mg once weekly  Previous Medications: Rybelsus (ineffective)    Adherence: Takes medication as directed and reports no missed doses  Adverse Effects: None      CGM Education - Freestyle Jasper 3  [x] Patient was educated on how to place her sensor. Educated to remove and replace sensor at least 1 inch from previous placement every 14 days.  [x] Patient will be using her reader to monitor her blood glucose. The device has been appropriately set up and connected to sensor.  [x] Patient instructed to keep monitoring device within 20 feet of sensor and regularly charged to receive blood glucose readings and alerts.  [x] Patient educated that sensor is water resistant and they may shower/bathe, exercise, or swim with sensor attached.  [x] Patient was educated on how to interpret BG readings, trend arrows, and graphs, and how to respond to certain trends.  [x] Patient educated that CGM readings are 10-15 minutes behind her actual blood glucose, and it is important to wait 15 minutes before re-treating a high or low blood glucose to avoid overcorrection.  [x] Patient was educated on how to adjust alarm settings for hyper and hypoglycemia.  [x] Patient educated to check BG with traditional  "glucometer (finger stick) when symptoms do not match sensor readings or sensor reading is not available (ie: error, or shows \"LOW\" or \"HIGH\").  [x] Patient instructed to contact  customer support if sensor falls off early or is otherwise damaged or not functioning as expected.        Objective   Vitals  BP Readings from Last 2 Encounters:   12/07/23 130/80   09/06/23 128/84     BMI Readings from Last 1 Encounters:   12/04/23 31.48 kg/m²      Labs  A1C  Lab Results   Component Value Date    HGBA1C 8.4 (A) 09/06/2023    HGBA1C 6.6 05/06/2021    HGBA1C 7.7 10/09/2019     BMP  Lab Results   Component Value Date    CALCIUM 9.9 09/12/2023     09/12/2023    K 4.5 09/12/2023    CO2 27 09/12/2023     (H) 09/12/2023    BUN 26 (H) 09/12/2023    CREATININE 1.14 (H) 09/12/2023    GFRF 50 (A) 09/12/2023     LFTs  Lab Results   Component Value Date    ALT 12 09/12/2023    AST 16 09/12/2023    ALKPHOS 65 09/12/2023    BILITOT 0.5 09/12/2023     FLP  Lab Results   Component Value Date    TRIG 163 (H) 09/12/2023    CHOL 104 09/12/2023    LDLF 43 09/12/2023    HDL 28.0 (A) 09/12/2023     Urine Microalbumin  No results found for: \"MICROALBCREA\"  Vitamin B12  Lab Results   Component Value Date    [id number] 158 (L) 09/12/2023         Assessment/Plan   Problem List Items Addressed This Visit       Diabetes mellitus, type 2 (Multi)     Diabetes: Uncontrolled with last A1c 8.4% on 9/6/23. Due for updated A1c. Current regimen includes metformin 1,00mg twice daily, Lantus 45 units twice daily, Humalog SS with meals, and Mounjaro 5mg once weekly. Patient presents today for education and training on use of [employer] 3 system. Patient was able to successfully place and connect first sensor today. Further discussed addition of SGLT2i for glucose control and renal benefits. Patient agreeable, medication counseling provided. Plan to start Jardiance 10mg once daily. Patient to begin using Humalog according to SS (2+2:50>150) " when Jardiance is started to avoid hypoglycemia.  Starting taking Jardiance 10mg once daily as directed.  When Jardiance is started, change Humalog to 2 units + SS(2:50>150) with meals.  Patient provided with written copy of sliding scale  Continue taking metformin 1,00mg twice daily, Lantus 45 units twice daily, and Mounjaro 5mg once weekly.  Start to monitor BG with Jasper 3 system.         Relevant Medications    empagliflozin (Jardiance) 10 mg    Other Relevant Orders    Follow Up In Advanced Primary Care - Pharmacy     Patient Education:  Counseled patient on relevant MOA, expectations, side effects, duration of therapy, contraindications, administration, and monitoring parameters.  All questions and concerns addressed. Contact pharmacist with any further questions or concerns prior to next appointment.  Reviewed BG goals: Fasting BG goal , Postprandial BG goal <180 mg/dL, A1C goal <7%  Reviewed signs, symptoms, and treatment of hypoglycemia.    Clinical Pharmacist follow-up: 5/29/24 at 11:30pm, Telehealth visit    Sly GarciaD  Clinical Pharmacist  05/16/24    Continue all meds under the continuation of care with the referring provider and clinical pharmacy team.  Verbal consent to manage patient's drug therapy was obtained from the patient. They were informed they may decline to participate or withdraw from participation in pharmacy services at any time.

## 2024-05-21 ENCOUNTER — PHARMACY VISIT (OUTPATIENT)
Dept: PHARMACY | Facility: CLINIC | Age: 79
End: 2024-05-21
Payer: COMMERCIAL

## 2024-05-21 PROCEDURE — RXMED WILLOW AMBULATORY MEDICATION CHARGE

## 2024-05-23 ENCOUNTER — TELEPHONE (OUTPATIENT)
Dept: PRIMARY CARE | Facility: CLINIC | Age: 79
End: 2024-05-23
Payer: MEDICARE

## 2024-05-23 DIAGNOSIS — F41.9 ANXIETY: ICD-10-CM

## 2024-05-23 PROCEDURE — RXMED WILLOW AMBULATORY MEDICATION CHARGE

## 2024-05-23 RX ORDER — ESCITALOPRAM OXALATE 10 MG/1
10 TABLET ORAL NIGHTLY
Qty: 90 TABLET | Refills: 3 | Status: SHIPPED | OUTPATIENT
Start: 2024-05-23

## 2024-05-28 ENCOUNTER — PHARMACY VISIT (OUTPATIENT)
Dept: PHARMACY | Facility: CLINIC | Age: 79
End: 2024-05-28
Payer: COMMERCIAL

## 2024-05-29 ENCOUNTER — TELEMEDICINE (OUTPATIENT)
Dept: PHARMACY | Facility: HOSPITAL | Age: 79
End: 2024-05-29
Payer: MEDICARE

## 2024-05-29 DIAGNOSIS — Z79.4 TYPE 2 DIABETES MELLITUS WITH OTHER SPECIFIED COMPLICATION, WITH LONG-TERM CURRENT USE OF INSULIN (MULTI): ICD-10-CM

## 2024-05-29 DIAGNOSIS — E11.69 TYPE 2 DIABETES MELLITUS WITH OTHER SPECIFIED COMPLICATION, WITH LONG-TERM CURRENT USE OF INSULIN (MULTI): ICD-10-CM

## 2024-05-29 PROCEDURE — RXMED WILLOW AMBULATORY MEDICATION CHARGE

## 2024-05-29 NOTE — PROGRESS NOTES
[facility] Pharmacist Visit - Diabetes Management  Luba Cortez is a 78 y.o. female was referred to Clinical Pharmacy Team for Diabetes.    Referring Provider: Sampson Sheth, *  PCP: Sampson Sheth MD - last visit: 12/4/23, next visit: 6/4/24    Subjective   HPI  PMH significant for T2DM, gastroparesis, HTN, HLD, hypothyroid, GERD, glaucoma, retinopathy.  Patient has no known history of medullary thyroid cancer, pancreatitis, or complicated UTI.  Known DM complications include retinopathy.  Special needs/barriers to therapy:  PAP      DIABETES ASSESSMENT  Medication Therapy  Current Medications:   Metformin 1,00mg twice daily  Lantus 45 units twice daily  Humalog 2 units + SS(2:50>150) with meals  Mounjaro 5mg once weekly (3 doses left)  Jardiance 10mg once daily   Previous Medications: Rybelsus (ineffective)    Adherence: Takes medication as directed and reports no missed doses  Adverse Effects: None    Risk Reducing Meds  On GLP1-RA: Yes  Established for several months, no exacerbation of gastroparesis  On SGLT2i: Yes  On ACEi/ARB: Yes   On Statin: Yes     Glucose Monitoring  Glucometer/CGM Type: Relion (OTC) - Received Jasper 3, would like-in person education    Previous home BG readings: (5/14/24) , 121, 98  Current home BG readings:  Time in Target   7-day   High (>180) 7%   Target Range () 93%   Low (<70) 0%     Average Glucose   7-day   Average 134   Overnight (12am - 6am) 156   Morning (6am - 12pm) 132   Afternoon (12pm - 6pm) 118   Evening (6pm-12am) 129         Hypoglycemia: Experienced 1 hypoglycemic events (BG 68), treated appropriately  Hyperglycemia: Denies signs and symptoms    Diabetic Eye Exam:  Following with opthalmology  Monofilament Foot Exam: Needs completed, last unknown      Secondary Prevention  ASCVD Risk:   The ASCVD Risk score (João DAVIS, et al., 2019) failed to calculate for the following reasons:    The valid total cholesterol range is 130 to 320 mg/dL  On  "Statin?: Yes, LDL at goal    Immunizations Needed: All up-to-date and documented  Tobacco Use: former smoker, quit 2011       Objective   Vitals  BP Readings from Last 2 Encounters:   12/07/23 130/80   09/06/23 128/84     BMI Readings from Last 1 Encounters:   12/04/23 31.48 kg/m²      Labs  A1C  Lab Results   Component Value Date    HGBA1C 8.4 (A) 09/06/2023    HGBA1C 6.6 05/06/2021    HGBA1C 7.7 10/09/2019     BMP  Lab Results   Component Value Date    CALCIUM 9.9 09/12/2023     09/12/2023    K 4.5 09/12/2023    CO2 27 09/12/2023     (H) 09/12/2023    BUN 26 (H) 09/12/2023    CREATININE 1.14 (H) 09/12/2023    GFRF 50 (A) 09/12/2023     LFTs  Lab Results   Component Value Date    ALT 12 09/12/2023    AST 16 09/12/2023    ALKPHOS 65 09/12/2023    BILITOT 0.5 09/12/2023     FLP  Lab Results   Component Value Date    TRIG 163 (H) 09/12/2023    CHOL 104 09/12/2023    LDLF 43 09/12/2023    HDL 28.0 (A) 09/12/2023     Urine Microalbumin  No results found for: \"MICROALBCREA\"  Vitamin B12  Lab Results   Component Value Date    [id number] 158 (L) 09/12/2023         Assessment/Plan   Problem List Items Addressed This Visit       Diabetes mellitus, type 2 (Multi)     Diabetes: Uncontrolled with last A1c 8.4% on 9/6/23. Current regimen includes Metformin 1,00mg twice daily, Lantus 45 units twice daily, Humalog 2 units + SS(2:50>150) with meals, Mounjaro 5mg once weekly, and Jardiance 10mg once daily. Tolerating addition of Jardiance well. Home BG readings with Jasper 7-day avg  with 93% in range. Due to CKD and glycemic benefit, plan to increase Jardiance to max dose. Due to BG very well-controlled, will discontinue Humalog. Follow-up in 2-3 weeks.  Increase Jardiance to 25mg once daily.  May take two 10mg tablets daily to use remaining supply  Stop taking Humalog  Continue taking Metformin 1,00mg twice daily, Lantus 45 units twice daily, and Mounjaro 5mg once weekly  Continue to monitor and record BG " daily  Prescription sent to [facility] pharmacy for assistance on authorization and copay. Medication will be mailed to patient.         Relevant Medications    empagliflozin (Jardiance) 25 mg    Other Relevant Orders    Follow Up In Advanced Primary Care - Pharmacy     Patient Education:  Counseled patient on relevant MOA, expectations, side effects, duration of therapy, contraindications, administration, and monitoring parameters.  All questions and concerns addressed. Contact pharmacist with any further questions or concerns prior to next appointment.    Clinical Pharmacist follow-up: 6/18/24 at 1:00pm, Telehealth visit    Sly GarciaD  Clinical Pharmacist  05/31/24    Continue all meds under the continuation of care with the referring provider and clinical pharmacy team.  Verbal consent to manage patient's drug therapy was obtained from the patient. They were informed they may decline to participate or withdraw from participation in pharmacy services at any time.

## 2024-05-31 ENCOUNTER — PHARMACY VISIT (OUTPATIENT)
Dept: PHARMACY | Facility: CLINIC | Age: 79
End: 2024-05-31
Payer: COMMERCIAL

## 2024-05-31 NOTE — ASSESSMENT & PLAN NOTE
Diabetes: Uncontrolled with last A1c 8.4% on 9/6/23. Current regimen includes Metformin 1,00mg twice daily, Lantus 45 units twice daily, Humalog 2 units + SS(2:50>150) with meals, Mounjaro 5mg once weekly, and Jardiance 10mg once daily. Tolerating addition of Jardiance well. Home BG readings with Jasper 7-day avg  with 93% in range. Due to CKD and glycemic benefit, plan to increase Jardiance to max dose. Due to BG very well-controlled, will discontinue Humalog. Follow-up in 2-3 weeks.  Increase Jardiance to 25mg once daily.  May take two 10mg tablets daily to use remaining supply  Stop taking Humalog  Continue taking Metformin 1,00mg twice daily, Lantus 45 units twice daily, and Mounjaro 5mg once weekly  Continue to monitor and record BG daily  Prescription sent to [facility] pharmacy for assistance on authorization and copay. Medication will be mailed to patient.

## 2024-06-04 ENCOUNTER — LAB (OUTPATIENT)
Dept: LAB | Facility: LAB | Age: 79
End: 2024-06-04
Payer: MEDICARE

## 2024-06-04 ENCOUNTER — OFFICE VISIT (OUTPATIENT)
Dept: PRIMARY CARE | Facility: CLINIC | Age: 79
End: 2024-06-04
Payer: MEDICARE

## 2024-06-04 VITALS
SYSTOLIC BLOOD PRESSURE: 122 MMHG | WEIGHT: 194 LBS | HEART RATE: 84 BPM | RESPIRATION RATE: 14 BRPM | HEIGHT: 67 IN | DIASTOLIC BLOOD PRESSURE: 80 MMHG | OXYGEN SATURATION: 100 % | BODY MASS INDEX: 30.45 KG/M2

## 2024-06-04 DIAGNOSIS — E03.9 ACQUIRED HYPOTHYROIDISM: ICD-10-CM

## 2024-06-04 DIAGNOSIS — N18.30 STAGE 3 CHRONIC KIDNEY DISEASE, UNSPECIFIED WHETHER STAGE 3A OR 3B CKD (MULTI): ICD-10-CM

## 2024-06-04 DIAGNOSIS — Z79.4 TYPE 2 DIABETES MELLITUS WITHOUT COMPLICATION, WITH LONG-TERM CURRENT USE OF INSULIN (MULTI): ICD-10-CM

## 2024-06-04 DIAGNOSIS — E55.9 VITAMIN D DEFICIENCY: ICD-10-CM

## 2024-06-04 DIAGNOSIS — E53.8 VITAMIN B12 DEFICIENCY: ICD-10-CM

## 2024-06-04 DIAGNOSIS — E11.9 TYPE 2 DIABETES MELLITUS WITHOUT COMPLICATION, WITH LONG-TERM CURRENT USE OF INSULIN (MULTI): Primary | ICD-10-CM

## 2024-06-04 DIAGNOSIS — Z79.4 TYPE 2 DIABETES MELLITUS WITHOUT COMPLICATION, WITH LONG-TERM CURRENT USE OF INSULIN (MULTI): Primary | ICD-10-CM

## 2024-06-04 DIAGNOSIS — E11.9 TYPE 2 DIABETES MELLITUS WITHOUT COMPLICATION, WITH LONG-TERM CURRENT USE OF INSULIN (MULTI): ICD-10-CM

## 2024-06-04 DIAGNOSIS — R06.2 WHEEZING: ICD-10-CM

## 2024-06-04 DIAGNOSIS — N18.31 STAGE 3A CHRONIC KIDNEY DISEASE (MULTI): ICD-10-CM

## 2024-06-04 LAB
25(OH)D3 SERPL-MCNC: 46 NG/ML (ref 30–100)
ALBUMIN SERPL BCP-MCNC: 3.9 G/DL (ref 3.4–5)
ALP SERPL-CCNC: 69 U/L (ref 33–136)
ALT SERPL W P-5'-P-CCNC: 10 U/L (ref 7–45)
ANION GAP SERPL CALC-SCNC: 10 MMOL/L (ref 10–20)
AST SERPL W P-5'-P-CCNC: 17 U/L (ref 9–39)
BILIRUB SERPL-MCNC: 0.5 MG/DL (ref 0–1.2)
BUN SERPL-MCNC: 34 MG/DL (ref 6–23)
CALCIUM SERPL-MCNC: 9.8 MG/DL (ref 8.6–10.3)
CHLORIDE SERPL-SCNC: 114 MMOL/L (ref 98–107)
CO2 SERPL-SCNC: 23 MMOL/L (ref 21–32)
CREAT SERPL-MCNC: 1.06 MG/DL (ref 0.5–1.05)
EGFRCR SERPLBLD CKD-EPI 2021: 54 ML/MIN/1.73M*2
ERYTHROCYTE [DISTWIDTH] IN BLOOD BY AUTOMATED COUNT: 14.1 % (ref 11.5–14.5)
GLUCOSE SERPL-MCNC: 126 MG/DL (ref 74–99)
HCT VFR BLD AUTO: 41.8 % (ref 36–46)
HGB BLD-MCNC: 13.1 G/DL (ref 12–16)
MCH RBC QN AUTO: 30.4 PG (ref 26–34)
MCHC RBC AUTO-ENTMCNC: 31.3 G/DL (ref 32–36)
MCV RBC AUTO: 97 FL (ref 80–100)
NRBC BLD-RTO: 0 /100 WBCS (ref 0–0)
PLATELET # BLD AUTO: 150 X10*3/UL (ref 150–450)
POC HEMOGLOBIN A1C: 6.1 % (ref 4.2–6.5)
POTASSIUM SERPL-SCNC: 4.3 MMOL/L (ref 3.5–5.3)
PROT SERPL-MCNC: 6.3 G/DL (ref 6.4–8.2)
RBC # BLD AUTO: 4.31 X10*6/UL (ref 4–5.2)
SODIUM SERPL-SCNC: 143 MMOL/L (ref 136–145)
T4 FREE SERPL-MCNC: 1.17 NG/DL (ref 0.61–1.12)
TSH SERPL-ACNC: 0.02 MIU/L (ref 0.44–3.98)
VIT B12 SERPL-MCNC: 152 PG/ML (ref 211–911)
WBC # BLD AUTO: 10.2 X10*3/UL (ref 4.4–11.3)

## 2024-06-04 PROCEDURE — 3074F SYST BP LT 130 MM HG: CPT | Performed by: INTERNAL MEDICINE

## 2024-06-04 PROCEDURE — 85027 COMPLETE CBC AUTOMATED: CPT

## 2024-06-04 PROCEDURE — 1159F MED LIST DOCD IN RCRD: CPT | Performed by: INTERNAL MEDICINE

## 2024-06-04 PROCEDURE — 82306 VITAMIN D 25 HYDROXY: CPT

## 2024-06-04 PROCEDURE — 82607 VITAMIN B-12: CPT

## 2024-06-04 PROCEDURE — 99213 OFFICE O/P EST LOW 20 MIN: CPT | Performed by: INTERNAL MEDICINE

## 2024-06-04 PROCEDURE — 80053 COMPREHEN METABOLIC PANEL: CPT

## 2024-06-04 PROCEDURE — 84443 ASSAY THYROID STIM HORMONE: CPT

## 2024-06-04 PROCEDURE — 83036 HEMOGLOBIN GLYCOSYLATED A1C: CPT | Performed by: INTERNAL MEDICINE

## 2024-06-04 PROCEDURE — 3079F DIAST BP 80-89 MM HG: CPT | Performed by: INTERNAL MEDICINE

## 2024-06-04 PROCEDURE — 84439 ASSAY OF FREE THYROXINE: CPT

## 2024-06-04 PROCEDURE — 36415 COLL VENOUS BLD VENIPUNCTURE: CPT

## 2024-06-04 RX ORDER — ALBUTEROL SULFATE 90 UG/1
2 AEROSOL, METERED RESPIRATORY (INHALATION) EVERY 4 HOURS PRN
Qty: 6.7 G | Refills: 5 | Status: SHIPPED | OUTPATIENT
Start: 2024-06-04 | End: 2025-06-04

## 2024-06-04 ASSESSMENT — PATIENT HEALTH QUESTIONNAIRE - PHQ9
2. FEELING DOWN, DEPRESSED OR HOPELESS: NOT AT ALL
SUM OF ALL RESPONSES TO PHQ9 QUESTIONS 1 AND 2: 0
1. LITTLE INTEREST OR PLEASURE IN DOING THINGS: NOT AT ALL

## 2024-06-04 NOTE — PATIENT INSTRUCTIONS
CONTINUE PRESENT REGIMEN, LET PHARMACY KNOW ABOUT YOUR INTERMITTENT LOW GLUCOSES, UNLESS INSULIN DOSE NEEDS TO BE DECREASED    2.  AS NEEDED ALBUTEROL CAN BE USED IF NEEDED TO HELP BREATHING FOR EXERCISE.  RECOMMEND STARTING A WALKING REGIMEN TO HELP WITH WEIGHT LOSS AND CARDIOVASCULAR HEALTH    3.  NON-FASTING LABS ARE ORDERED FOR YOU TO MONITOR THE MEDICATIONS    4.  FOLLOW UP 6 MONTHS OR AS NEEDED

## 2024-06-04 NOTE — PROGRESS NOTES
"Elen Cortez is a 78 y.o. female who presents for  FOLLOW UP      HPI   LOST WEIGHT WORKING WITH THE PHARMACIST    CLOTHES FITTING LOOSER NOW    IN PROCESS OF INCREASING MOUNJARO    HAS BEEN TAKEN OFF OF HUMALOG    REMAINS ON LANTUS 45 UNITS TWICE PER DAY      Review of Systems   Constitutional:  Negative for chills, diaphoresis and fever.   Respiratory:  Negative for cough and shortness of breath.    Cardiovascular:  Negative for chest pain and leg swelling.   Gastrointestinal:  Negative for constipation, diarrhea, nausea and vomiting.   Endocrine:        PER HPI   Musculoskeletal:  Negative for joint swelling and myalgias.       Objective   /80   Pulse 84   Resp 14   Ht 1.702 m (5' 7\")   Wt 88 kg (194 lb)   SpO2 100%   BMI 30.38 kg/m²     Physical Exam  Vitals reviewed.   Constitutional:       General: She is not in acute distress.     Appearance: She is obese. She is not ill-appearing.   Eyes:      Comments: RIGHT LATERAL SCLERA S/P SCLERAL TRANSPLANT   Cardiovascular:      Rate and Rhythm: Normal rate and regular rhythm.      Pulses: Normal pulses.      Heart sounds:      No gallop.   Pulmonary:      Breath sounds: Normal breath sounds. No wheezing, rhonchi or rales.   Abdominal:      General: Abdomen is flat. Bowel sounds are normal.      Palpations: Abdomen is soft.      Tenderness: There is no guarding or rebound.   Musculoskeletal:      Right lower leg: No edema.      Left lower leg: No edema.         Assessment/Plan   Problem List Items Addressed This Visit       Diabetes mellitus, type 2 (Multi) - Primary    Relevant Orders    POCT glycosylated hemoglobin (Hb A1C) manually resulted    CBC (Completed)    Comprehensive Metabolic Panel (Completed)    Hypothyroidism    Relevant Orders    TSH with reflex to Free T4 if abnormal (Completed)    Vitamin D deficiency    Relevant Orders    Vitamin D 25-Hydroxy,Total (for eval of Vitamin D levels) (Completed)    Stage 3 chronic kidney " disease, unspecified whether stage 3a or 3b CKD (Multi)     MONITOR WITH SERIAL LABS           Stage 3a chronic kidney disease (Multi)     MONITOR WITH SERIAL LABS            Other Visit Diagnoses       Vitamin B12 deficiency        Relevant Orders    Vitamin B12 (Completed)    Wheezing        Relevant Medications    albuterol (Proventil HFA) 90 mcg/actuation inhaler          Patient Instructions    CONTINUE PRESENT REGIMEN, LET PHARMACY KNOW ABOUT YOUR INTERMITTENT LOW GLUCOSES, UNLESS INSULIN DOSE NEEDS TO BE DECREASED    2.  AS NEEDED ALBUTEROL CAN BE USED IF NEEDED TO HELP BREATHING FOR EXERCISE.  RECOMMEND STARTING A WALKING REGIMEN TO HELP WITH WEIGHT LOSS AND CARDIOVASCULAR HEALTH    3.  NON-FASTING LABS ARE ORDERED FOR YOU TO MONITOR THE MEDICATIONS    4.  FOLLOW UP 6 MONTHS OR AS NEEDED

## 2024-06-05 DIAGNOSIS — E03.9 ACQUIRED HYPOTHYROIDISM: ICD-10-CM

## 2024-06-05 PROBLEM — N18.30 STAGE 3 CHRONIC KIDNEY DISEASE, UNSPECIFIED WHETHER STAGE 3A OR 3B CKD (MULTI): Status: ACTIVE | Noted: 2024-06-05

## 2024-06-05 PROBLEM — C64.2 RENAL CELL CARCINOMA OF LEFT KIDNEY (MULTI): Status: RESOLVED | Noted: 2023-03-28 | Resolved: 2024-06-05

## 2024-06-05 PROBLEM — N18.31 STAGE 3A CHRONIC KIDNEY DISEASE (MULTI): Status: ACTIVE | Noted: 2024-06-05

## 2024-06-05 RX ORDER — LEVOTHYROXINE SODIUM 100 UG/1
100 TABLET ORAL DAILY
Qty: 90 TABLET | Refills: 3 | Status: SHIPPED | OUTPATIENT
Start: 2024-06-05 | End: 2025-06-05

## 2024-06-05 ASSESSMENT — ENCOUNTER SYMPTOMS
JOINT SWELLING: 0
DIAPHORESIS: 0
MYALGIAS: 0
CHILLS: 0
VOMITING: 0
SHORTNESS OF BREATH: 0
ENDOCRINE COMMENTS: PER HPI
FEVER: 0
COUGH: 0
CONSTIPATION: 0
DIARRHEA: 0
NAUSEA: 0

## 2024-06-18 ENCOUNTER — APPOINTMENT (OUTPATIENT)
Dept: PHARMACY | Facility: HOSPITAL | Age: 79
End: 2024-06-18
Payer: MEDICARE

## 2024-06-18 DIAGNOSIS — Z79.4 TYPE 2 DIABETES MELLITUS WITH OTHER SPECIFIED COMPLICATION, WITH LONG-TERM CURRENT USE OF INSULIN (MULTI): ICD-10-CM

## 2024-06-18 DIAGNOSIS — I10 PRIMARY HYPERTENSION: ICD-10-CM

## 2024-06-18 DIAGNOSIS — E11.69 TYPE 2 DIABETES MELLITUS WITH OTHER SPECIFIED COMPLICATION, WITHOUT LONG-TERM CURRENT USE OF INSULIN (MULTI): ICD-10-CM

## 2024-06-18 DIAGNOSIS — E55.9 VITAMIN D DEFICIENCY: ICD-10-CM

## 2024-06-18 DIAGNOSIS — E11.69 TYPE 2 DIABETES MELLITUS WITH OTHER SPECIFIED COMPLICATION, WITH LONG-TERM CURRENT USE OF INSULIN (MULTI): ICD-10-CM

## 2024-06-18 RX ORDER — AMLODIPINE BESYLATE 2.5 MG/1
2.5 TABLET ORAL DAILY
Qty: 90 TABLET | Refills: 3 | Status: SHIPPED | OUTPATIENT
Start: 2024-06-18

## 2024-06-18 RX ORDER — ASPIRIN 325 MG
50000 TABLET, DELAYED RELEASE (ENTERIC COATED) ORAL
Qty: 12 CAPSULE | Refills: 3 | Status: SHIPPED | OUTPATIENT
Start: 2024-06-23 | End: 2025-06-23

## 2024-06-18 ASSESSMENT — ENCOUNTER SYMPTOMS: DIABETIC ASSOCIATED SYMPTOMS: 0

## 2024-06-18 NOTE — PATIENT INSTRUCTIONS
Mounjaro Education:     Counseled patient on Mounjaro MOA, expectations, side effects, duration of therapy, administration, and monitoring parameters.  Provided detailed dosing and administration counseling to ensure proper technique.   Reviewed Mounjaro titration schedule, starting with 2.5 mg once weekly to a goal of 15 mg once weekly if tolerated  Counseled patient on the benefits of GLP-1ra glycemic control and weight loss  Reviewed storage requirements of Mounjaro when not in use, and when to administer the medication if a dose is missed.  Advised patient that they may experience improved satiety after meals and portion sizes of meals may be reduced as doses of Mounjaro increase.

## 2024-06-18 NOTE — PROGRESS NOTES
"Subjective   Patient ID: Luba Cortez is a 79 y.o. female who presents for No chief complaint on file..A1c improved and current weight 189 lbs (194 at last PCP visit on 6/4-2 weeks); 2 reported hypoglycemia episodes since last follow-up; none below 60 mg/dL-treated appropriately. No symptoms of hypoglycemia reported. No reported changes to diet/exercise. Tolerating regimen well with no reported adverse effects.     BG (mg/dL):   7-day average: 128  14-day average: 135  30-day average: 138    BG over last 7 days:  Time in target range: 93%  Above: 7%  Below: 0%    Diabetes  She presents for her follow-up diabetic visit. She has type 2 diabetes mellitus. Her disease course has been stable. There are no hypoglycemic associated symptoms. There are no diabetic associated symptoms.     Assessment/Plan   Problem List Items Addressed This Visit       Diabetes mellitus, type 2 (Multi)       LAB RESULTS:  Lab Results   Component Value Date    HGBA1C 6.1 06/04/2024    HGBA1C 8.4 (A) 09/06/2023    HGBA1C 6.6 05/06/2021     Lab Results   Component Value Date    CREATININE 1.06 (H) 06/04/2024      Lab Results   Component Value Date    CHOL 104 09/12/2023    CHOL 95 05/09/2022     Lab Results   Component Value Date    HDL 28.0 (A) 09/12/2023    HDL 25.0 (A) 05/09/2022       No results found for: \"LDLCALC\"  Lab Results   Component Value Date    TRIG 163 (H) 09/12/2023    TRIG 121 05/09/2022       Lab Results   Component Value Date    GNPUEIKH12 152 (L) 06/04/2024       Continue all meds under the continuation of care with the referring provider and clinical pharmacy team.    Enroll in Libreview: email invite sent to patient   Continue Mounjaro 5 mg weekly - patient to call for refill at [facility]- pt to enroll in autofill for future Rx's; discussed text alerts  Losing 2.5 lbs/week over last 2 weeks  Continue metformin 1000 mg BID  Continue Jardiance 25 mg daily  Decrease Lantus by 20%-hypoglycemia prevention: 35 units BID; consider " further cross-titrating with Mounjaro depending on glycemic response and current weight at follow-up   Follow up: 1 month

## 2024-06-20 PROCEDURE — RXMED WILLOW AMBULATORY MEDICATION CHARGE

## 2024-06-21 ENCOUNTER — PHARMACY VISIT (OUTPATIENT)
Dept: PHARMACY | Facility: CLINIC | Age: 79
End: 2024-06-21
Payer: COMMERCIAL

## 2024-06-25 ENCOUNTER — HOSPITAL ENCOUNTER (EMERGENCY)
Facility: HOSPITAL | Age: 79
Discharge: HOME | End: 2024-06-25
Attending: STUDENT IN AN ORGANIZED HEALTH CARE EDUCATION/TRAINING PROGRAM
Payer: COMMERCIAL

## 2024-06-25 ENCOUNTER — APPOINTMENT (OUTPATIENT)
Dept: RADIOLOGY | Facility: HOSPITAL | Age: 79
End: 2024-06-25
Payer: COMMERCIAL

## 2024-06-25 VITALS
OXYGEN SATURATION: 95 % | TEMPERATURE: 96.8 F | DIASTOLIC BLOOD PRESSURE: 72 MMHG | HEART RATE: 76 BPM | WEIGHT: 193 LBS | HEIGHT: 67 IN | SYSTOLIC BLOOD PRESSURE: 129 MMHG | BODY MASS INDEX: 30.29 KG/M2 | RESPIRATION RATE: 18 BRPM

## 2024-06-25 DIAGNOSIS — S99.192A CLOSED FRACTURE OF BASE OF FIFTH METATARSAL BONE OF LEFT FOOT AT METAPHYSEAL-DIAPHYSEAL JUNCTION, INITIAL ENCOUNTER: Primary | ICD-10-CM

## 2024-06-25 PROCEDURE — 73610 X-RAY EXAM OF ANKLE: CPT | Mod: LT

## 2024-06-25 PROCEDURE — 73630 X-RAY EXAM OF FOOT: CPT | Mod: LEFT SIDE | Performed by: STUDENT IN AN ORGANIZED HEALTH CARE EDUCATION/TRAINING PROGRAM

## 2024-06-25 PROCEDURE — 73610 X-RAY EXAM OF ANKLE: CPT | Mod: LEFT SIDE | Performed by: STUDENT IN AN ORGANIZED HEALTH CARE EDUCATION/TRAINING PROGRAM

## 2024-06-25 PROCEDURE — 99284 EMERGENCY DEPT VISIT MOD MDM: CPT

## 2024-06-25 PROCEDURE — 73630 X-RAY EXAM OF FOOT: CPT | Mod: LT

## 2024-06-25 RX ORDER — IBUPROFEN 400 MG/1
400 TABLET ORAL ONCE
Status: DISCONTINUED | OUTPATIENT
Start: 2024-06-25 | End: 2024-06-25 | Stop reason: HOSPADM

## 2024-06-25 RX ORDER — DEXTROMETHORPHAN HYDROBROMIDE, GUAIFENESIN 5; 100 MG/5ML; MG/5ML
650 LIQUID ORAL EVERY 8 HOURS PRN
Qty: 30 TABLET | Refills: 0 | Status: SHIPPED | OUTPATIENT
Start: 2024-06-25 | End: 2024-07-05

## 2024-06-25 ASSESSMENT — PAIN DESCRIPTION - FREQUENCY: FREQUENCY: CONSTANT/CONTINUOUS

## 2024-06-25 ASSESSMENT — PAIN DESCRIPTION - DESCRIPTORS
DESCRIPTORS: ACHING
DESCRIPTORS: DISCOMFORT
DESCRIPTORS: DISCOMFORT

## 2024-06-25 ASSESSMENT — PAIN DESCRIPTION - LOCATION: LOCATION: ANKLE

## 2024-06-25 ASSESSMENT — COLUMBIA-SUICIDE SEVERITY RATING SCALE - C-SSRS
2. HAVE YOU ACTUALLY HAD ANY THOUGHTS OF KILLING YOURSELF?: NO
6. HAVE YOU EVER DONE ANYTHING, STARTED TO DO ANYTHING, OR PREPARED TO DO ANYTHING TO END YOUR LIFE?: NO
1. IN THE PAST MONTH, HAVE YOU WISHED YOU WERE DEAD OR WISHED YOU COULD GO TO SLEEP AND NOT WAKE UP?: NO

## 2024-06-25 ASSESSMENT — PAIN SCALES - GENERAL: PAINLEVEL_OUTOF10: 8

## 2024-06-25 ASSESSMENT — LIFESTYLE VARIABLES
HAVE YOU EVER FELT YOU SHOULD CUT DOWN ON YOUR DRINKING: NO
EVER HAD A DRINK FIRST THING IN THE MORNING TO STEADY YOUR NERVES TO GET RID OF A HANGOVER: NO
HAVE PEOPLE ANNOYED YOU BY CRITICIZING YOUR DRINKING: NO
EVER FELT BAD OR GUILTY ABOUT YOUR DRINKING: NO
TOTAL SCORE: 0

## 2024-06-25 ASSESSMENT — PAIN DESCRIPTION - ORIENTATION: ORIENTATION: LEFT

## 2024-06-25 ASSESSMENT — PAIN - FUNCTIONAL ASSESSMENT: PAIN_FUNCTIONAL_ASSESSMENT: 0-10

## 2024-06-25 NOTE — DISCHARGE INSTRUCTIONS
Thank you for visiting the [facility] emergency department.  Please wear your boot prior to your appointment with your assigned orthopedic surgeon.  You may take ibuprofen or Tylenol every 6 hours, or alternate between ibuprofen and Tylenol every 6 hours for pain.  Please come back to the emergency department if you experience increasing pain despite taking her pain medications, discoloration in your foot, or foul-smelling discharge coming from your foot.

## 2024-06-25 NOTE — ED PROVIDER NOTES
"EMERGENCY DEPARTMENT ENCOUNTER      Pt Name: Luba Cortez  MRN: [id number]  Birthdate 1945  Date of evaluation: 6/25/2024  Provider: Sulma Weaver MD    CHIEF COMPLAINT       Chief Complaint   Patient presents with    Ankle Pain     Patient states she was walking in shoes with a \"higher heel\" and rolled her ankle 10 days ago         HISTORY OF PRESENT ILLNESS    The patient is a 71-year-old female who comes to the emergency department due to left lateral foot pain that has been going on for the past 1 week.  The patient states that she rolled her ankle 1 week ago by inverting her foot.  The patient said that she immediately felt a crack.  The patient says that the pain has been constant and increasing since the incident.  The patient states that she comes to the emergency department today at the urging of her daughter due to increasing pain.  The patient is able to walk on her injured foot.  The patient rates her lateral left foot pain a 7 out of 10.  The pain is a dull achy pain that is worse when she stands or walks or applies pressure to the foot.  The patient has taken Advil for her pain which she says helps with the pain slightly.  The patient has not taken any pain meds today.  The patient has a history of diabetes and chronic kidney disease.  The patient's medications include albuterol, amlodipine, aspirin, atorvastatin, Synthroid, metformin, and valsartan.      History provided by:  Patient   used: No        Nursing Notes were reviewed.    PAST MEDICAL HISTORY     Past Medical History:   Diagnosis Date    Malignant neoplasm of unspecified site of right eye (Multi) 08/19/2016    Malignant melanoma of right eye    Other abnormal and inconclusive findings on diagnostic imaging of breast 11/05/2020    Abnormal finding on breast imaging    Personal history of malignant neoplasm, unspecified     History of malignant neoplasm    Personal history of other diseases of the " musculoskeletal system and connective tissue     History of arthritis    Personal history of other diseases of the respiratory system     History of bronchitis    Personal history of other drug therapy 10/03/2016    History of influenza vaccination    Personal history of other endocrine, nutritional and metabolic disease     History of insulin dependent diabetes mellitus    Personal history of other specified conditions 06/29/2020    History of dysuria    Personal history of other specified conditions 01/13/2020    History of abnormal mammogram    Personal history of other specified conditions 09/10/2018    History of fever    Personal history of other specified conditions 12/04/2019    History of lump of right breast    Personal history of other specified conditions 09/10/2018    History of right flank pain    Personal history of other specified conditions 10/07/2020    History of urinary urgency    Type 2 diabetes mellitus with mild nonproliferative diabetic retinopathy without macular edema, right eye (Multi) 05/11/2018    Type 2 diabetes mellitus with mild nonproliferative retinopathy of right eye without macular edema    Unspecified abdominal pain     Flank pain, acute    Unspecified abnormal findings in urine 10/26/2021    Foul smelling urine    Urinary tract infection, site not specified 01/21/2020    Acute UTI         SURGICAL HISTORY       Past Surgical History:   Procedure Laterality Date    CARPAL TUNNEL RELEASE  07/28/2016    Neuroplasty Decompression Median Nerve At Carpal Tunnel    CATARACT EXTRACTION  08/31/2016    Cataract Surgery    HYSTERECTOMY  07/28/2016    Hysterectomy    LITHOTRIPSY  08/31/2016    Renal Lithotripsy    SHOULDER SURGERY  07/28/2016    Shoulder Surgery    TOTAL ABDOMINAL HYSTERECTOMY W/ BILATERAL SALPINGOOPHORECTOMY  08/31/2016    Total Abdominal Hysterectomy With Removal Of Both Ovaries         CURRENT MEDICATIONS       Previous Medications    ALBUTEROL (PROVENTIL HFA) 90  "MCG/ACTUATION INHALER    Inhale 2 puffs every 4 hours if needed for wheezing or shortness of breath.    AMLODIPINE (NORVASC) 2.5 MG TABLET    Take 1 tablet (2.5 mg) by mouth once daily.    ASPIRIN 81 MG EC TABLET    Take 1 tablet (81 mg) by mouth once daily.    ATORVASTATIN (LIPITOR) 10 MG TABLET    Take 1 tablet (10 mg) by mouth once daily.    BD LUER-CLARENCE SYRINGE 3 ML 25 GAUGE X 1\" SYRINGE    use weekly with B-12 INJECTIONS as directed    CHOLECALCIFEROL (VITAMIN D-3) 50,000 UNIT CAPSULE    Take 1 capsule (50,000 Units) by mouth 1 (one) time per week.    CYANOCOBALAMIN (VITAMIN B-12) 1,000 MCG/ML INJECTION    Inject 1 mL (1,000 mcg) into the muscle every 30 (thirty) days.    DEBROX 6.5 % OTIC SOLUTION    Administer into affected ear(s).    DROPLET PEN NEEDLE 32 GAUGE X 5/32\" NEEDLE    use 1 PEN NEEDLE to inject MEDICATION subcutaneously six times a day    EMPAGLIFLOZIN (JARDIANCE) 25 MG    Take 1 tablet (25 mg) by mouth once daily.    ESCITALOPRAM (LEXAPRO) 10 MG TABLET    Take 1 tablet (10 mg) by mouth once daily at bedtime.    FENOFIBRATE (TRICOR) 145 MG TABLET    TAKE 1 TABLET BY MOUTH ONCE DAILY    INSULIN GLARGINE (LANTUS SOLOSTAR U-100 INSULIN) 100 UNIT/ML (3 ML) PEN    Inject 45 Units under the skin 2 times a day.    LEVOTHYROXINE (SYNTHROID, LEVOXYL) 100 MCG TABLET    Take 1 tablet (100 mcg) by mouth early in the morning.. Take on an empty stomach at the same time each day, either 30 to 60 minutes prior to breakfast    METFORMIN (GLUCOPHAGE) 1,000 MG TABLET    Take 1 tablet (1,000 mg) by mouth 2 times a day with meals.    METOPROLOL TARTRATE (LOPRESSOR) 50 MG TABLET    Take 1 tablet (50 mg) by mouth in the morning and 1 tablet (50 mg) before bedtime.    TAMOXIFEN (NOLVADEX) 20 MG TABLET    Take 1 tablet (20 mg total) by mouth once daily.    TIRZEPATIDE (MOUNJARO) 5 MG/0.5 ML PEN INJECTOR    Inject 5 mg under the skin 1 (one) time per week.    VALSARTAN (DIOVAN) 320 MG TABLET    TAKE 1 TABLET BY MOUTH EVERY " DAY       ALLERGIES     Adhesive tape-silicones, Codeine, Iodinated contrast media, Iodine, and Liraglutide    FAMILY HISTORY       Family History   Problem Relation Name Age of Onset    Diabetes Mother      Breast cancer Mother      Uterine cancer Mother      Glaucoma Mother      Other (cardiac disorder) Father      COPD Father      Heart disease Father      Macular degeneration Neg Hx            SOCIAL HISTORY       Social History     Socioeconomic History    Marital status:      Spouse name: Not on file    Number of children: Not on file    Years of education: Not on file    Highest education level: Not on file   Occupational History    Not on file   Tobacco Use    Smoking status: Former     Current packs/day: 0.00     Types: Cigarettes     Quit date:      Years since quittin.4    Smokeless tobacco: Never   Substance and Sexual Activity    Alcohol use: Not Currently    Drug use: Not Currently    Sexual activity: Not on file   Other Topics Concern    Not on file   Social History Narrative    Not on file     Social Determinants of Health     Financial Resource Strain: Not on file   Food Insecurity: Not on file   Transportation Needs: Not on file   Physical Activity: Not on file   Stress: Not on file   Social Connections: Not on file   Intimate Partner Violence: Not on file   Housing Stability: Not on file       SCREENINGS                        PHYSICAL EXAM    (up to 7 for level 4, 8 or more for level 5)     ED Triage Vitals [24 1001]   Temperature Heart Rate Respirations BP   36 °C (96.8 °F) 81 18 146/71      Pulse Ox Temp Source Heart Rate Source Patient Position   97 % Temporal -- Sitting      BP Location FiO2 (%)     Left arm --       Physical Exam  Constitutional:       General: She is not in acute distress.     Appearance: Normal appearance. She is not ill-appearing, toxic-appearing or diaphoretic.   HENT:      Head: Normocephalic and atraumatic.   Eyes:      General: No scleral  icterus.        Right eye: No discharge.         Left eye: No discharge.      Extraocular Movements: Extraocular movements intact.      Conjunctiva/sclera: Conjunctivae normal.   Cardiovascular:      Rate and Rhythm: Normal rate and regular rhythm.      Heart sounds: Normal heart sounds. No murmur heard.     No friction rub. No gallop.   Pulmonary:      Effort: Pulmonary effort is normal. No respiratory distress.      Breath sounds: Normal breath sounds. No stridor. No wheezing, rhonchi or rales.   Chest:      Chest wall: No tenderness.   Musculoskeletal:         General: Tenderness and signs of injury present. No swelling or deformity. Normal range of motion.      Cervical back: Normal range of motion and neck supple. No rigidity.      Comments: The patient rolled her left foot and has tenderness on the lateral dorsum of her left foot.   Skin:     General: Skin is warm and dry.      Coloration: Skin is not jaundiced or pale.      Findings: No bruising, erythema, lesion or rash.   Neurological:      General: No focal deficit present.      Mental Status: She is alert.   Psychiatric:         Mood and Affect: Mood normal.         Behavior: Behavior normal.         Thought Content: Thought content normal.         Judgment: Judgment normal.          DIAGNOSTIC RESULTS     LABS:  Labs Reviewed - No data to display    All other labs were within normal range or not returned as of this dictation.    Imaging  XR ankle left 3+ views   Final Result   Acute nondisplaced fracture 5th metatarsal base metadiaphyseal   junction.        MACRO   None        Signed by: Camron Stephens 6/25/2024 11:09 AM   Dictation workstation:   [id number]      XR foot left 3+ views   Final Result   Acute nondisplaced fracture 5th metatarsal base metadiaphyseal   junction.        MACRO   None        Signed by: Camron Stephens 6/25/2024 11:09 AM   Dictation workstation:   [id number]           Procedures  Procedures     EMERGENCY DEPARTMENT  COURSE/MDM:     Diagnoses as of 06/25/24 1138   Closed fracture of base of fifth metatarsal bone of left foot at metaphyseal-diaphyseal junction, initial encounter        Medical Decision Making  The patient received an x-ray of her left ankle and left foot.  The patient's x-rays showed that the patient had a fracture of the fifth metatarsal bone on her left foot.  The patient was placed in a boot and refer to an orthopedic surgeon, Dr. Wen.  The patient was advised to alternate between ibuprofen and Tylenol for pain management every 6 hours as needed.  Return precautions were given.        Patient and or family in agreement and understanding of treatment plan.  All questions answered.      I reviewed the case with the attending ED physician. The attending ED physician agrees with the plan. Patient and/or patient´s representative was counseled regarding labs, imaging, likely diagnosis, and plan. All questions were answered.    ED Medications administered this visit:    Medications   ibuprofen tablet 400 mg (400 mg oral Not Given 6/25/24 1055)       New Prescriptions from this visit:    New Prescriptions    No medications on file       Follow-up:  Sampson Sheth MD  [address] [address] [id number] [phone]    In 3 days  As needed    Jonah Wen MD  [address] [facility] [id number] [phone]    In 1 day          Final Impression:   1. Closed fracture of base of fifth metatarsal bone of left foot at metaphyseal-diaphyseal junction, initial encounter          (Please note that portions of this note were completed with a voice recognition program.  Efforts were made to edit the dictations but occasionally words are mis-transcribed.)     Sulma Weaver MD  Resident  06/25/24 [id number]

## 2024-06-28 ENCOUNTER — OFFICE VISIT (OUTPATIENT)
Dept: ORTHOPEDIC SURGERY | Facility: CLINIC | Age: 79
End: 2024-06-28
Payer: COMMERCIAL

## 2024-06-28 VITALS — BODY MASS INDEX: 30.29 KG/M2 | HEIGHT: 67 IN | WEIGHT: 193 LBS

## 2024-06-28 DIAGNOSIS — S92.352A CLOSED FRACTURE OF BASE OF FIFTH METATARSAL BONE OF LEFT FOOT, INITIAL ENCOUNTER: Primary | ICD-10-CM

## 2024-06-28 PROCEDURE — 99214 OFFICE O/P EST MOD 30 MIN: CPT | Mod: 57 | Performed by: STUDENT IN AN ORGANIZED HEALTH CARE EDUCATION/TRAINING PROGRAM

## 2024-06-28 PROCEDURE — 28470 CLTX METATARSAL FX WO MNP EA: CPT | Performed by: STUDENT IN AN ORGANIZED HEALTH CARE EDUCATION/TRAINING PROGRAM

## 2024-06-28 NOTE — PROGRESS NOTES
Acute Injury New Patient Visit    HPI: Luba is a 79 y.o.female who presents today with new complaints of left fifth metatarsal fracture.  She states that 2 weeks ago she twisted her ankle while walking and did not go to the ER until 6/25/2024.  She is found to have a left fifth metatarsal fracture at the metadiaphyseal junction.  She has been wearing a short boot.  She has been weightbearing.  She states that the pain is only when she is walking, but not at rest.  She denies any pain around the ankle or knee.  She denies any numbness and tingling.  She is here with her .    Plan: For this left Roper fracture, we will place her in a boot, provide her with a walker so she can maintain nonweightbearing status, and we will repeat x-rays in 10 to 14 days to ensure that this is beginning to show signs of healing.  This will be a total of a 6 to 8-week nonweightbearing type injury to ensure proper healing.  Follow-up with repeat x-rays.    Assessment:   Problem List Items Addressed This Visit    None  Visit Diagnoses       Closed fracture of base of fifth metatarsal bone of left foot, initial encounter    -  Primary    Relevant Orders    Wheeled Folding Walker            Diagnostics: X-rays reviewed and reveal a Roper fracture of the left fifth metatarsal.      Procedure:  Procedures    Physical Exam:  GENERAL:  No obvious acute distress.  NEURO:  Distally neurovascularly intact.  Sensation intact to light touch.  Extremity: Left foot examination:  Skin is intact;  No erythema or warmth;  No obvious deformity;  No clinical signs of infection;  TENDER over the base of the fifth metatarsal bone with associated swelling and bruising;  No pain in the midfoot;  No pain over the metatarsal bones;  No pain over the cuboid bone;  No pain over the calcaneus;  No pain over the plantar aponeurosis;  No pain over the proximal phalanx of the right great toe;  No pain over distal phalanx of the right great toe;  and  Neurovascularly intact.      Orders Placed This Encounter    Wheeled Folding Walker      At the conclusion of the visit there were no further questions by the patient/family regarding their plan of care.  Patient was instructed to call or return with any issues, questions, or concerns regarding their injury and/or treatment plan described above.     06/28/24 at 4:53 PM - Samuel Henderson, DO    Office: [phone]    This note was prepared using voice recognition software.  The details of this note are correct and have been reviewed, and corrected to the best of my ability.  Some grammatical errors may persist related to the Dragon software.

## 2024-07-01 PROCEDURE — RXMED WILLOW AMBULATORY MEDICATION CHARGE

## 2024-07-03 ENCOUNTER — PHARMACY VISIT (OUTPATIENT)
Dept: PHARMACY | Facility: CLINIC | Age: 79
End: 2024-07-03
Payer: COMMERCIAL

## 2024-07-05 ENCOUNTER — APPOINTMENT (OUTPATIENT)
Dept: OPHTHALMOLOGY | Facility: CLINIC | Age: 79
End: 2024-07-05
Payer: MEDICARE

## 2024-07-05 DIAGNOSIS — T66.XXXD POST-RADIATION RETINOPATHY, SUBSEQUENT ENCOUNTER: ICD-10-CM

## 2024-07-05 DIAGNOSIS — H35.89 POST-RADIATION RETINOPATHY, SUBSEQUENT ENCOUNTER: ICD-10-CM

## 2024-07-05 DIAGNOSIS — E11.3211 MILD NONPROLIFERATIVE DIABETIC RETINOPATHY OF RIGHT EYE WITH MACULAR EDEMA ASSOCIATED WITH TYPE 2 DIABETES MELLITUS (MULTI): ICD-10-CM

## 2024-07-05 DIAGNOSIS — H40.1131 PRIMARY OPEN ANGLE GLAUCOMA OF BOTH EYES, MILD STAGE: Primary | ICD-10-CM

## 2024-07-05 PROCEDURE — 99214 OFFICE O/P EST MOD 30 MIN: CPT | Performed by: OPHTHALMOLOGY

## 2024-07-05 PROCEDURE — 92083 EXTENDED VISUAL FIELD XM: CPT | Performed by: OPHTHALMOLOGY

## 2024-07-05 PROCEDURE — 92133 CPTRZD OPH DX IMG PST SGM ON: CPT | Performed by: OPHTHALMOLOGY

## 2024-07-05 RX ORDER — FERROUS SULFATE, DRIED 160(50) MG
1 TABLET, EXTENDED RELEASE ORAL DAILY
COMMUNITY

## 2024-07-05 RX ORDER — TAFLUPROST OPTHALMIC 0 MG/.3ML
1 SOLUTION/ DROPS OPHTHALMIC NIGHTLY
Qty: 30 EACH | Refills: 11 | Status: SHIPPED | OUTPATIENT
Start: 2024-07-05 | End: 2025-07-05

## 2024-07-05 RX ORDER — DORZOLAMIDE HYDROCHLORIDE AND TIMOLOL MALEATE PRESERVATIVE FREE 20; 5 MG/ML; MG/ML
1 SOLUTION/ DROPS OPHTHALMIC 2 TIMES DAILY
Qty: 60 EACH | Refills: 11 | Status: SHIPPED | OUTPATIENT
Start: 2024-07-05 | End: 2025-07-05

## 2024-07-05 ASSESSMENT — CONF VISUAL FIELD
OS_SUPERIOR_TEMPORAL_RESTRICTION: 0
OS_INFERIOR_TEMPORAL_RESTRICTION: 0
OS_SUPERIOR_NASAL_RESTRICTION: 0
OD_SUPERIOR_TEMPORAL_RESTRICTION: 0
OS_INFERIOR_NASAL_RESTRICTION: 0
OD_INFERIOR_NASAL_RESTRICTION: 0
COMMENTS: SEE VF
OD_NORMAL: 1
OD_SUPERIOR_NASAL_RESTRICTION: 0
OD_INFERIOR_TEMPORAL_RESTRICTION: 0
OS_NORMAL: 1

## 2024-07-05 ASSESSMENT — EXTERNAL EXAM - RIGHT EYE: OD_EXAM: NORMAL

## 2024-07-05 ASSESSMENT — TONOMETRY
IOP_METHOD: GOLDMANN APPLANATION
OD_IOP_MMHG: 14
OS_IOP_MMHG: 13

## 2024-07-05 ASSESSMENT — ENCOUNTER SYMPTOMS
ENDOCRINE NEGATIVE: 0
CARDIOVASCULAR NEGATIVE: 0
HEMATOLOGIC/LYMPHATIC NEGATIVE: 0
EYES NEGATIVE: 1
CONSTITUTIONAL NEGATIVE: 0
RESPIRATORY NEGATIVE: 0
PSYCHIATRIC NEGATIVE: 0
NEUROLOGICAL NEGATIVE: 0
GASTROINTESTINAL NEGATIVE: 0
MUSCULOSKELETAL NEGATIVE: 0
ALLERGIC/IMMUNOLOGIC NEGATIVE: 0

## 2024-07-05 ASSESSMENT — SLIT LAMP EXAM - LIDS
COMMENTS: NORMAL
COMMENTS: NORMAL

## 2024-07-05 ASSESSMENT — EXTERNAL EXAM - LEFT EYE: OS_EXAM: NORMAL

## 2024-07-05 ASSESSMENT — PACHYMETRY
OS_CT(UM): 551
OD_CT(UM): 562

## 2024-07-05 ASSESSMENT — CUP TO DISC RATIO
OD_RATIO: 0.6
OS_RATIO: 0.3

## 2024-07-05 ASSESSMENT — VISUAL ACUITY
OD_SC: 20/70
OS_SC: 20/25
METHOD: SNELLEN - LINEAR

## 2024-07-05 NOTE — PROGRESS NOTES
Visual Acuity (Snellen - Linear)         Right Left    Dist sc 20/70 20/25          Tonometry       Tonometry (Goldmann Applanation, 9:56 AM)         Right Left    Pressure 14 13                  Assessment/Plan   Last dilated:  4/29/22    1.  Pre-Perimetric Glaucoma OU:  /550.  Tm 27/24.  IOP borderline.  dorzolamide/timolol -> severe itching,  latanoprost -> itching (no better than timolol by itself as far as IOP control).  Pt with severe itching and now with some follicles indicating that there may be allergy to brimonidine.  However, this could also be a FRANDY issue.  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    SLT OD had been recommended, but pt had complications from diabetes and was unable to have.  IOP OD is now controlled.  RNFL shows some generalized reduction, but no glaucoma specific changes so this is an acceptable IOP.      Plan:  cont zioptan OU QHS               cont PF cosopt OU BID               F/u 6 months    2.  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        Plan:  as per Dr. Syed    3.  Pseudophakia (PCIOL) OU:  s/p YAG cap OD      Plan:  monitor    4.  ERM OD:  c/w radiation retinopathy.  Recently getting injections for blood vessel leakage      Plan:  as per Dr. Syed    5. DME OD: s/p eyelea intravitreal injection OD 4/18/22 by Dr. Syed      Plan: as per Dr. Syed

## 2024-07-09 ENCOUNTER — HOSPITAL ENCOUNTER (OUTPATIENT)
Dept: RADIOLOGY | Facility: HOSPITAL | Age: 79
Discharge: HOME | End: 2024-07-09
Payer: COMMERCIAL

## 2024-07-09 ENCOUNTER — APPOINTMENT (OUTPATIENT)
Dept: ORTHOPEDIC SURGERY | Facility: CLINIC | Age: 79
End: 2024-07-09
Payer: COMMERCIAL

## 2024-07-09 DIAGNOSIS — S92.352A CLOSED FRACTURE OF BASE OF FIFTH METATARSAL BONE OF LEFT FOOT, INITIAL ENCOUNTER: ICD-10-CM

## 2024-07-09 DIAGNOSIS — S92.352D CLOSED FRACTURE OF BASE OF FIFTH METATARSAL BONE WITH ROUTINE HEALING, LEFT: Primary | ICD-10-CM

## 2024-07-09 PROCEDURE — 73630 X-RAY EXAM OF FOOT: CPT | Mod: LT

## 2024-07-09 PROCEDURE — 73630 X-RAY EXAM OF FOOT: CPT | Mod: LEFT SIDE | Performed by: RADIOLOGY

## 2024-07-09 PROCEDURE — 99024 POSTOP FOLLOW-UP VISIT: CPT | Performed by: STUDENT IN AN ORGANIZED HEALTH CARE EDUCATION/TRAINING PROGRAM

## 2024-07-09 NOTE — PROGRESS NOTES
Established follow-up patient Visit    HPI: Luba is a 79 y.o.female who presents today for follow-up of her left Roper fracture.  She states that she has been nonweightbearing with her walker.  States that she is feeling better.  She is here with her .  She no longer has any swelling or bruising.  She denies any interval injuries.  She has been wearing her boot.    On 6/28/2024, she presented with new complaints of left fifth metatarsal fracture.  She states that 2 weeks ago she twisted her ankle while walking and did not go to the ER until 6/25/2024.  She is found to have a left fifth metatarsal fracture at the metadiaphyseal junction.  She has been wearing a short boot.  She has been weightbearing.  She states that the pain is only when she is walking, but not at rest.  She denies any pain around the ankle or knee.  She denies any numbness and tingling.  She is here with her .    Plan: Today, for this left Roper fracture that shows some signs of interval healing and no change in alignment, we will continue with her nonweightbearing status with her walker and boot, and follow-up in approximately 2 weeks for repeat x-rays.  At this point we will be about 6 weeks out, and can possibly begin to transition into some weightbearing activity in her boot.    On 6/20/2024, for this left Roper fracture, we will place her in a boot, provide her with a walker so she can maintain nonweightbearing status, and we will repeat x-rays in 10 to 14 days to ensure that this is beginning to show signs of healing.  This will be a total of a 6 to 8-week nonweightbearing type injury to ensure proper healing.  Follow-up with repeat x-rays.    Assessment:   Problem List Items Addressed This Visit    None  Visit Diagnoses       Closed fracture of base of fifth metatarsal bone with routine healing, left    -  Primary    Closed fracture of base of fifth metatarsal bone of left foot, initial encounter        Relevant Orders     XR foot left 3+ views            Diagnostics: X-rays reviewed and reveal a Roper fracture of the left fifth metatarsal, with minimal signs of interval healing, but no change in alignment.      Procedure:  Procedures    Physical Exam:  GENERAL:  No obvious acute distress.  NEURO:  Distally neurovascularly intact.  Sensation intact to light touch.  Extremity: Left foot examination:  Skin is intact;  No erythema or warmth;  No obvious deformity;  No clinical signs of infection;  Mildly TENDER over the base of the fifth metatarsal bone with no associated swelling and bruising today;  No pain in the midfoot;  No pain over the metatarsal bones;  No pain over the cuboid bone;  No pain over the calcaneus;  No pain over the plantar aponeurosis;  No pain over the proximal phalanx of the right great toe;  No pain over distal phalanx of the right great toe; and  Neurovascularly intact.      Orders Placed This Encounter    XR foot left 3+ views      At the conclusion of the visit there were no further questions by the patient/family regarding their plan of care.  Patient was instructed to call or return with any issues, questions, or concerns regarding their injury and/or treatment plan described above.     07/09/24 at 11:51 AM - Samuel Henderson DO    Office: [phone]    This note was prepared using voice recognition software.  The details of this note are correct and have been reviewed, and corrected to the best of my ability.  Some grammatical errors may persist related to the Dragon software.

## 2024-07-16 ENCOUNTER — APPOINTMENT (OUTPATIENT)
Dept: PHARMACY | Facility: HOSPITAL | Age: 79
End: 2024-07-16
Payer: MEDICARE

## 2024-07-16 DIAGNOSIS — Z79.4 TYPE 2 DIABETES MELLITUS WITH OTHER SPECIFIED COMPLICATION, WITH LONG-TERM CURRENT USE OF INSULIN (MULTI): ICD-10-CM

## 2024-07-16 DIAGNOSIS — E11.69 TYPE 2 DIABETES MELLITUS WITH OTHER SPECIFIED COMPLICATION, WITH LONG-TERM CURRENT USE OF INSULIN (MULTI): ICD-10-CM

## 2024-07-16 PROCEDURE — RXMED WILLOW AMBULATORY MEDICATION CHARGE

## 2024-07-16 RX ORDER — LANCETS 33 GAUGE
EACH MISCELLANEOUS
Qty: 100 EACH | Refills: 3 | Status: SHIPPED | OUTPATIENT
Start: 2024-07-16

## 2024-07-16 RX ORDER — DEXTROSE 4 G
TABLET,CHEWABLE ORAL
Qty: 1 EACH | Refills: 0 | Status: SHIPPED | OUTPATIENT
Start: 2024-07-16

## 2024-07-16 RX ORDER — TIRZEPATIDE 7.5 MG/.5ML
7.5 INJECTION, SOLUTION SUBCUTANEOUS
Qty: 2 ML | Refills: 0 | Status: SHIPPED | OUTPATIENT
Start: 2024-07-16

## 2024-07-16 RX ORDER — BLOOD SUGAR DIAGNOSTIC
STRIP MISCELLANEOUS
Qty: 100 EACH | Refills: 3 | Status: SHIPPED | OUTPATIENT
Start: 2024-07-16

## 2024-07-16 RX ORDER — ISOPROPYL ALCOHOL 70 ML/100ML
SWAB TOPICAL
Qty: 100 EACH | Refills: 3 | Status: SHIPPED | OUTPATIENT
Start: 2024-07-16

## 2024-07-16 NOTE — PROGRESS NOTES
Clinical Pharmacy Appointment    Patient ID: Luba Cortez is a 79 y.o. female who presents for Diabetes.    Pt is here for Follow Up appointment.   Referring Provider: Sampson Sheth, *  PCP: Sampson Sheth MD, last visit: 65/4/24, next visit: 11/26/24    Subjective   HPI  PMH significant for T2DM, gastroparesis, HTN, HLD, hypothyroid, GERD, glaucoma, retinopathy.   Special needs/barriers to therapy:  [facility] Enrolled    Drug Interactions  No significant drug interactions were noted.    Medication System Management  Patients preferred pharmacy: [facility]  Adherence/Organization: No current concerns  Affordability/Accessibility: No current concerns, enrolled in [facility]      DIABETES MELLITUS Type 2:    Known diabetic complications: retinopathy and autonomic neuropathy.  Hx or FH Hx of MTC/MEN2?: No, Pancreatitis?: No, Gastroparesis?: No, UTI/Yeast Infections?: No    Follows with Endocrinology?: No  Diabetic Eye Exam: Following with ophthalmology  Monofilament Foot Exam: Needs completed, last unknown     Current diabetic medications include:  Metformin 1,00mg twice daily  Lantus 35 units twice daily  Humalog 2 units + SS(2:50>150) with meals  Mounjaro 5mg once weekly  Jardiance 10mg once daily   Previous medications: Rybelsus (ineffective)     Clarifications to above regimen: None  Adverse Effects: None    Glucose Readings:  Glucometer/CGM Type: Jasper 3 (connected via [employer])    Previous home BG readings: (6/18/24) 7-day Avg , TIR 93%  Current home BG readings:        Any episodes of hypoglycemia? No,   .  Did patient treat episode of hypoglycemia appropriately? N/A  Does the patient have a prescription for ready-to-use Glucagon? No , not needed    Risk Reducing Medications:  Statin? Yes  ACE-I/ARB? Yes    Preventative Care  Immunizations Needed: All up-to-date and documented  Tobacco Use: former smoker, quit 2011     Objective   Allergies   Allergen Reactions    Adhesive Tape-Silicones  "Unknown    Codeine Itching, Nausea Only, Other and Unknown     n/v    Iodinated Contrast Media Unknown    Iodine Unknown    Liraglutide Diarrhea, Nausea Only, Headache and Unknown     victoza     Social History     Social History Narrative    Not on file      Medication Review  Current Outpatient Medications   Medication Instructions    albuterol (Proventil HFA) 90 mcg/actuation inhaler 2 puffs, inhalation, Every 4 hours PRN    alcohol swabs pads, medicated Use as directed to check blood glucose once daily.    amLODIPine (NORVASC) 2.5 mg, oral, Daily    aspirin 81 mg EC tablet 1 tablet, oral, Daily    atorvastatin (LIPITOR) 10 mg, oral, Daily    BD Luer-Ella Syringe 3 mL 25 gauge x 1\" syringe use weekly with B-12 INJECTIONS as directed    blood sugar diagnostic (OneTouch Ultra Test) strip Use as directed to check blood glucose once daily.    blood-glucose meter (OneTouch Ultra2 Meter) misc Use as directed to monitor blood glucose daily.    calcium carbonate-vitamin D3 500 mg-5 mcg (200 unit) tablet 1 tablet, oral, Daily    cholecalciferol (VITAMIN D-3) 50,000 Units, oral, Once Weekly    cyanocobalamin (VITAMIN B-12) 1,000 mcg, intramuscular, Every 30 days    Debrox 6.5 % otic solution otic (ear)    dorzolamide-timolol, PF, (Cosopt, PF,) 2-0.5 % ophthalmic solution 1 drop, Both Eyes, 2 times daily    Droplet Pen Needle 32 gauge x 5/32\" needle use 1 PEN NEEDLE to inject MEDICATION subcutaneously six times a day    escitalopram (LEXAPRO) 10 mg, oral, Nightly    fenofibrate (TRICOR) 145 mg, oral, Daily    Jardiance 25 mg, oral, Daily    lancets (OneTouch Delica Plus Lancet) 33 gauge misc Use as directed to check blood glucose once daily.    Lantus Solostar U-100 Insulin 45 Units, subcutaneous, 2 times daily    levothyroxine (SYNTHROID, LEVOXYL) 100 mcg, oral, Daily, Take on an empty stomach at the same time each day, either 30 to 60 minutes prior to breakfast    metFORMIN (GLUCOPHAGE) 1,000 mg, oral, 2 times daily " "(morning and late afternoon)    metoprolol tartrate (LOPRESSOR) 50 mg, oral, 2 times daily    Mounjaro 7.5 mg, subcutaneous, Every 7 days    tafluprost, PF, 0.0015 % dropperette 1 drop, ophthalmic (eye), Nightly    tamoxifen (NOLVADEX) 20 mg, oral, Daily    valsartan (DIOVAN) 320 mg, oral, Daily      Vitals  BP Readings from Last 2 Encounters:   06/25/24 129/72   06/04/24 122/80     BMI Readings from Last 1 Encounters:   06/28/24 30.23 kg/m²      Labs  A1C  Lab Results   Component Value Date    HGBA1C 6.1 06/04/2024    HGBA1C 8.4 (A) 09/06/2023    HGBA1C 6.6 05/06/2021     BMP  Lab Results   Component Value Date    CALCIUM 9.8 06/04/2024     06/04/2024    K 4.3 06/04/2024    CO2 23 06/04/2024     (H) 06/04/2024    BUN 34 (H) 06/04/2024    CREATININE 1.06 (H) 06/04/2024    EGFR 54 (L) 06/04/2024     LFTs  Lab Results   Component Value Date    ALT 10 06/04/2024    AST 17 06/04/2024    ALKPHOS 69 06/04/2024    BILITOT 0.5 06/04/2024     FLP  Lab Results   Component Value Date    TRIG 163 (H) 09/12/2023    CHOL 104 09/12/2023    LDLF 43 09/12/2023    HDL 28.0 (A) 09/12/2023     Urine Microalbumin  No results found for: \"MICROALBCREA\"  Weight Management  Wt Readings from Last 3 Encounters:   06/28/24 87.5 kg (193 lb)   06/25/24 87.5 kg (193 lb)   06/04/24 88 kg (194 lb)      There is no height or weight on file to calculate BMI.     Assessment/Plan   Problem List Items Addressed This Visit       Diabetes mellitus, type 2 (Multi)     Patient's A1c is 6.1% on 6/4/24. Goal A1c <7%.   Patient's SMBGs are at goal with Jasper 14-day Avg  and TIR 90% with no hypoglycemia.  Rationale for plan: Due to metabolic benefits, plan to continue titrating Mounjaro to maximally tolerated dose. Because BG readings are currently well-controlled, will also decrease Lantus by 20% to avoid hypoglycemia.    Medication Changes:  CONTINUE:  Metformin 1,00mg twice daily  Humalog 2 units + SS(2:50>150) with meals  Jardiance 10mg " once daily   INCREASE  Mounjaro 7.5mg once weekly  DECREASE  Lantus 28 units twice daily         Relevant Medications    tirzepatide (Mounjaro) 7.5 mg/0.5 mL pen injector    blood-glucose meter (OneTouch Ultra2 Meter) misc    blood sugar diagnostic (OneTouch Ultra Test) strip    lancets (OneTouch Delica Plus Lancet) 33 gauge misc    alcohol swabs pads, medicated    Other Relevant Orders    Follow Up In [facility] - Pharmacy     Monitoring and Education:  Counseled patient on relevant MOA, expectations, side effects, duration of therapy, contraindications, administration, and monitoring parameters.  All questions and concerns addressed. Contact pharmacist with any further questions or concerns prior to next appointment.  Reviewed BG goals: Fasting BG goal , Postprandial BG goal <180 mg/dL, A1C goal <7%    Clinical Pharmacist follow-up: 8/13/24 at 1:00pm, Telehealth visit    Continue all meds under the continuation of care with the referring provider and clinical pharmacy team.    Thank you,  Sola Alcaraz, PharmD  Clinical Pharmacist  [phone]    Verbal consent to manage patient's drug therapy was obtained from the patient. They were informed they may decline to participate or withdraw from participation in pharmacy services at any time.

## 2024-07-17 ENCOUNTER — PHARMACY VISIT (OUTPATIENT)
Dept: PHARMACY | Facility: CLINIC | Age: 79
End: 2024-07-17
Payer: COMMERCIAL

## 2024-07-17 NOTE — ASSESSMENT & PLAN NOTE
Patient's A1c is 6.1% on 6/4/24. Goal A1c <7%.   Patient's SMBGs are at goal with Jasper 14-day Avg  and TIR 90% with no hypoglycemia.  Rationale for plan: Due to metabolic benefits, plan to continue titrating Mounjaro to maximally tolerated dose. Because BG readings are currently well-controlled, will also decrease Lantus by 20% to avoid hypoglycemia.    Medication Changes:  CONTINUE:  Metformin 1,00mg twice daily  Humalog 2 units + SS(2:50>150) with meals  Jardiance 10mg once daily   INCREASE  Mounjaro 7.5mg once weekly  DECREASE  Lantus 28 units twice daily

## 2024-07-18 ENCOUNTER — PHARMACY VISIT (OUTPATIENT)
Dept: PHARMACY | Facility: CLINIC | Age: 79
End: 2024-07-18

## 2024-07-24 ENCOUNTER — APPOINTMENT (OUTPATIENT)
Dept: ORTHOPEDIC SURGERY | Facility: CLINIC | Age: 79
End: 2024-07-24
Payer: COMMERCIAL

## 2024-07-24 ENCOUNTER — HOSPITAL ENCOUNTER (OUTPATIENT)
Dept: RADIOLOGY | Facility: HOSPITAL | Age: 79
Discharge: HOME | End: 2024-07-24
Payer: COMMERCIAL

## 2024-07-24 DIAGNOSIS — S92.352A CLOSED FRACTURE OF BASE OF FIFTH METATARSAL BONE OF LEFT FOOT, INITIAL ENCOUNTER: ICD-10-CM

## 2024-07-24 PROCEDURE — 99024 POSTOP FOLLOW-UP VISIT: CPT | Performed by: STUDENT IN AN ORGANIZED HEALTH CARE EDUCATION/TRAINING PROGRAM

## 2024-07-24 PROCEDURE — 73630 X-RAY EXAM OF FOOT: CPT | Mod: LT

## 2024-07-24 PROCEDURE — 73630 X-RAY EXAM OF FOOT: CPT | Mod: LEFT SIDE | Performed by: RADIOLOGY

## 2024-07-24 NOTE — PROGRESS NOTES
Established follow-up patient Visit    HPI: Luba is a 79 y.o.female who presents today for 6-week follow-up of her left Roper fracture.  She states that she is feeling better and has been in her boot and minimally weightbearing.  She has been using her walker.  She denies any interval falls or injuries.  She feels like she is walking on a ball, however.  She denies any numbness and tingling.  She denies any significant swelling or bruising.  No other complaints or concerns at this time.    7/9/2024, she presented for follow-up of her left Roper fracture.  She states that she has been nonweightbearing with her walker.  States that she is feeling better.  She is here with her .  She no longer has any swelling or bruising.  She denies any interval injuries.  She has been wearing her boot.    On 6/28/2024, she presented with new complaints of left fifth metatarsal fracture.  She states that 2 weeks ago she twisted her ankle while walking and did not go to the ER until 6/25/2024.  She is found to have a left fifth metatarsal fracture at the metadiaphyseal junction.  She has been wearing a short boot.  She has been weightbearing.  She states that the pain is only when she is walking, but not at rest.  She denies any pain around the ankle or knee.  She denies any numbness and tingling.  She is here with her .    Plan: Today, on 7/24/2024, after review of her x-rays which do show some good callus formation across the fracture site, we will continue in her boot, but add weightbearing at this time for approximately 3-4 more weeks.  We will follow-up at that time with repeat x-rays of the left foot.  If she is doing well at that time, we will transition her into a postop shoe.    On 7/9/2024, for this left Roper fracture that shows some signs of interval healing and no change in alignment, we will continue with her nonweightbearing status with her walker and boot, and follow-up in approximately 2 weeks for  repeat x-rays.  At this point we will be about 6 weeks out, and can possibly begin to transition into some weightbearing activity in her boot.    On 6/20/2024, for this left Roper fracture, we will place her in a boot, provide her with a walker so she can maintain nonweightbearing status, and we will repeat x-rays in 10 to 14 days to ensure that this is beginning to show signs of healing.  This will be a total of a 6 to 8-week nonweightbearing type injury to ensure proper healing.  Follow-up with repeat x-rays.    Assessment:   Problem List Items Addressed This Visit    None  Visit Diagnoses       Closed fracture of base of fifth metatarsal bone of left foot, initial encounter        Relevant Orders    XR foot left 3+ views            Diagnostics: X-rays reviewed and reveal a Roper fracture of the left fifth metatarsal, with signs of interval healing, but no change in alignment.      Procedure:  Procedures    Physical Exam:  GENERAL:  No obvious acute distress.  NEURO:  Distally neurovascularly intact.  Sensation intact to light touch.  Extremity: Left foot examination:  Skin is intact;  No erythema or warmth;  No obvious deformity;  No clinical signs of infection;  Minimally TENDER over the base of the fifth metatarsal bone with no associated swelling and bruising today;  No pain in the midfoot;  No pain over the metatarsal bones;  No pain over the cuboid bone;  No pain over the calcaneus;  No pain over the plantar aponeurosis;  No pain over the proximal phalanx of the right great toe;  No pain over distal phalanx of the right great toe; and  Neurovascularly intact.      Orders Placed This Encounter    XR foot left 3+ views      At the conclusion of the visit there were no further questions by the patient/family regarding their plan of care.  Patient was instructed to call or return with any issues, questions, or concerns regarding their injury and/or treatment plan described above.     07/24/24 at 2:15 PM -  Samuel Henderson,     Office: [phone]    This note was prepared using voice recognition software.  The details of this note are correct and have been reviewed, and corrected to the best of my ability.  Some grammatical errors may persist related to the Dragon software.

## 2024-07-29 ENCOUNTER — APPOINTMENT (OUTPATIENT)
Dept: OPHTHALMOLOGY | Facility: CLINIC | Age: 79
End: 2024-07-29
Payer: MEDICARE

## 2024-07-29 DIAGNOSIS — E78.1 HYPERTRIGLYCERIDEMIA: ICD-10-CM

## 2024-07-29 DIAGNOSIS — E11.3211 MILD NONPROLIFERATIVE DIABETIC RETINOPATHY OF RIGHT EYE WITH MACULAR EDEMA ASSOCIATED WITH TYPE 2 DIABETES MELLITUS (MULTI): Primary | ICD-10-CM

## 2024-07-29 PROCEDURE — 92134 CPTRZ OPH DX IMG PST SGM RTA: CPT | Mod: BILATERAL PROCEDURE | Performed by: OPHTHALMOLOGY

## 2024-07-29 PROCEDURE — RXMED WILLOW AMBULATORY MEDICATION CHARGE

## 2024-07-29 PROCEDURE — 67028 INJECTION EYE DRUG: CPT | Mod: RIGHT SIDE | Performed by: OPHTHALMOLOGY

## 2024-07-29 RX ORDER — FENOFIBRATE 145 MG/1
145 TABLET, FILM COATED ORAL DAILY
Qty: 90 TABLET | Refills: 3 | Status: SHIPPED | OUTPATIENT
Start: 2024-07-29

## 2024-07-29 ASSESSMENT — VISUAL ACUITY
OD_PH_SC: 20/70
METHOD: SNELLEN - LINEAR
OD_SC+: -2
OS_SC: 20/25
OS_SC+: -2
OD_SC: 20/70

## 2024-07-29 ASSESSMENT — ENCOUNTER SYMPTOMS
NEUROLOGICAL NEGATIVE: 0
CONSTITUTIONAL NEGATIVE: 0
CARDIOVASCULAR NEGATIVE: 0
ALLERGIC/IMMUNOLOGIC NEGATIVE: 0
HEMATOLOGIC/LYMPHATIC NEGATIVE: 0
MUSCULOSKELETAL NEGATIVE: 0
RESPIRATORY NEGATIVE: 0
PSYCHIATRIC NEGATIVE: 0
ENDOCRINE NEGATIVE: 0
GASTROINTESTINAL NEGATIVE: 0
EYES NEGATIVE: 1

## 2024-07-29 ASSESSMENT — TONOMETRY
OS_IOP_MMHG: 14
IOP_METHOD: GOLDMANN APPLANATION
OD_IOP_MMHG: 14

## 2024-07-29 ASSESSMENT — PACHYMETRY
OD_CT(UM): 562
OS_CT(UM): 551

## 2024-07-29 NOTE — PROGRESS NOTES
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  -appears stable  -Last CT abd pelvis 9/2017: no liver metastasis- small nodule that has not changed.    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 Healthy lifestyle.       4) OHTN OU, glaucoma suspect  -continue FU and gtts per Dr. Lim.     5 today has DME OD       TREAT RIGHT    Treatment options for DME OD discussed, including observation, anti-VEGF injections (including Avastin, Lucentis, Beovu, Vabysmo and Eylea) and laser. Recommend anti-VEGF injections. Eylea done OD today in a sterile manner with Betadine 5% for antisepsis.    Fu 1m

## 2024-07-31 ENCOUNTER — PHARMACY VISIT (OUTPATIENT)
Dept: PHARMACY | Facility: CLINIC | Age: 79
End: 2024-07-31
Payer: COMMERCIAL

## 2024-08-13 ENCOUNTER — APPOINTMENT (OUTPATIENT)
Dept: PHARMACY | Facility: HOSPITAL | Age: 79
End: 2024-08-13
Payer: COMMERCIAL

## 2024-08-13 DIAGNOSIS — Z79.4 TYPE 2 DIABETES MELLITUS WITH OTHER SPECIFIED COMPLICATION, WITH LONG-TERM CURRENT USE OF INSULIN (MULTI): ICD-10-CM

## 2024-08-13 DIAGNOSIS — E11.69 TYPE 2 DIABETES MELLITUS WITH OTHER SPECIFIED COMPLICATION, WITH LONG-TERM CURRENT USE OF INSULIN (MULTI): ICD-10-CM

## 2024-08-13 PROCEDURE — RXMED WILLOW AMBULATORY MEDICATION CHARGE

## 2024-08-13 RX ORDER — TIRZEPATIDE 7.5 MG/.5ML
7.5 INJECTION, SOLUTION SUBCUTANEOUS
Qty: 2 ML | Refills: 0 | Status: SHIPPED | OUTPATIENT
Start: 2024-08-13

## 2024-08-13 NOTE — PROGRESS NOTES
Clinical Pharmacy Appointment    Patient ID: Luba Cortez is a 79 y.o. female who presents for Diabetes.    Pt is here for Follow Up appointment.   Referring Provider: Sampson Sheth, *  PCP: Sampson Sheth MD, last visit: 65/4/24, next visit: 11/26/24    Subjective   HPI  PMH significant for T2DM, gastroparesis, HTN, HLD, hypothyroid, GERD, glaucoma, retinopathy.   Special needs/barriers to therapy: None identified     Patient Assistance for Mounjaro, Humalog, and Lantus approved through 5/3/25. Will have to be renewed prior to that date to prevent lapse in coverage. Medication(s) will be received at no cost to patient from [facility].    Drug Interactions  No significant drug interactions were noted.    Medication System Management  Patients preferred pharmacy: [facility]  Adherence/Organization: No current concerns  Affordability/Accessibility: No current concerns, enrolled in  PAP      DIABETES MELLITUS Type 2:    Known diabetic complications: retinopathy and autonomic neuropathy.  Hx or FH Hx of MTC/MEN2?: No, Pancreatitis?: No, Gastroparesis?: No, UTI/Yeast Infections?: No    Follows with Endocrinology?: No  Diabetic Eye Exam: Following with ophthalmology  Monofilament Foot Exam: Needs completed, last unknown     Current diabetic medications include:  Metformin 1,000mg twice daily  Jardiance 10mg once daily  Lantus 35 units twice daily  Humalog 2 units + SS(2:50>150) with meals  Mounjaro 7.5mg once weekly  Previous medications: Rybelsus (ineffective)     Clarifications to above regimen: Did not reduce Lantus dose as previously discussed due to receiving refill with outdated directions.  Adverse Effects: Hypoglycemia, otherwise none    Glucose Readings:  Glucometer/CGM Type: Onetouch Ultra 2  (Difficulty with use of [place] system --> Advised to contact  for troubleshooting)    Previous home BG readings: (7/16/24) 14-day Avg , TIR 90%   Current home BG readings:  "-140. After dinner and before bed BG 60's-70's.    Any episodes of hypoglycemia? Several evening BG 60-70's with shakiness. Patient reports 1 overnight alert of 63, which she treated with cookies and milk.  Did patient treat episode of hypoglycemia appropriately? N/A  Does the patient have a prescription for ready-to-use Glucagon? No , not needed    Risk Reducing Medications:  Statin? Yes  ACE-I/ARB? Yes    Preventative Care  Immunizations Needed: All up-to-date and documented  Tobacco Use: former smoker, quit 2011     Objective   Allergies   Allergen Reactions    Adhesive Tape-Silicones Unknown    Codeine Itching, Nausea Only, Other and Unknown     n/v    Iodinated Contrast Media Unknown    Iodine Unknown    Liraglutide Diarrhea, Nausea Only, Headache and Unknown     victoza     Social History     Social History Narrative    Not on file      Medication Review  Current Outpatient Medications   Medication Instructions    albuterol (Proventil HFA) 90 mcg/actuation inhaler 2 puffs, inhalation, Every 4 hours PRN    alcohol swabs pads, medicated Use as directed to check blood glucose once daily.    amLODIPine (NORVASC) 2.5 mg, oral, Daily    aspirin 81 mg EC tablet 1 tablet, oral, Daily    atorvastatin (LIPITOR) 10 mg, oral, Daily    BD Luer-Ella Syringe 3 mL 25 gauge x 1\" syringe use weekly with B-12 INJECTIONS as directed    blood sugar diagnostic (OneTouch Ultra Test) strip Use as directed to check blood glucose once daily.    blood-glucose meter (OneTouch Ultra2 Meter) misc Use as directed to monitor blood glucose daily.    calcium carbonate-vitamin D3 500 mg-5 mcg (200 unit) tablet 1 tablet, oral, Daily    cholecalciferol (VITAMIN D-3) 50,000 Units, oral, Once Weekly    cyanocobalamin (VITAMIN B-12) 1,000 mcg, intramuscular, Every 30 days    Debrox 6.5 % otic solution otic (ear)    dorzolamide-timolol, PF, (Cosopt, PF,) 2-0.5 % ophthalmic solution 1 drop, Both Eyes, 2 times daily    Droplet Pen Needle 32 gauge " "x 5/32\" needle use 1 PEN NEEDLE to inject MEDICATION subcutaneously six times a day    escitalopram (LEXAPRO) 10 mg, oral, Nightly    fenofibrate (TRICOR) 145 mg, oral, Daily    Jardiance 25 mg, oral, Daily    lancets (OneTouch Delica Plus Lancet) 33 gauge misc Use as directed to check blood glucose once daily.    Lantus Solostar U-100 Insulin 45 Units, subcutaneous, 2 times daily    levothyroxine (SYNTHROID, LEVOXYL) 100 mcg, oral, Daily, Take on an empty stomach at the same time each day, either 30 to 60 minutes prior to breakfast    metFORMIN (GLUCOPHAGE) 1,000 mg, oral, 2 times daily (morning and late afternoon)    metoprolol tartrate (LOPRESSOR) 50 mg, oral, 2 times daily    Mounjaro 7.5 mg, subcutaneous, Every 7 days    tafluprost, PF, 0.0015 % dropperette 1 drop, ophthalmic (eye), Nightly    tamoxifen (NOLVADEX) 20 mg, oral, Daily    valsartan (DIOVAN) 320 mg, oral, Daily      Vitals  BP Readings from Last 2 Encounters:   06/25/24 129/72   06/04/24 122/80     BMI Readings from Last 1 Encounters:   06/28/24 30.23 kg/m²      Labs  A1C  Lab Results   Component Value Date    HGBA1C 6.1 06/04/2024    HGBA1C 8.4 (A) 09/06/2023    HGBA1C 6.6 05/06/2021     BMP  Lab Results   Component Value Date    CALCIUM 9.8 06/04/2024     06/04/2024    K 4.3 06/04/2024    CO2 23 06/04/2024     (H) 06/04/2024    BUN 34 (H) 06/04/2024    CREATININE 1.06 (H) 06/04/2024    EGFR 54 (L) 06/04/2024     LFTs  Lab Results   Component Value Date    ALT 10 06/04/2024    AST 17 06/04/2024    ALKPHOS 69 06/04/2024    BILITOT 0.5 06/04/2024     FLP  Lab Results   Component Value Date    TRIG 163 (H) 09/12/2023    CHOL 104 09/12/2023    LDLF 43 09/12/2023    HDL 28.0 (A) 09/12/2023     Urine Microalbumin  No results found for: \"MICROALBCREA\"  Weight Management  Wt Readings from Last 3 Encounters:   06/28/24 87.5 kg (193 lb)   06/25/24 87.5 kg (193 lb)   06/04/24 88 kg (194 lb)      There is no height or weight on file to calculate " BMI.     Assessment/Plan   Problem List Items Addressed This Visit       Diabetes mellitus, type 2 (Multi)     Patient's A1c is 6.1% on 6/4/24. Goal A1c <7%.  Patient's SMBGs are not at goal, -140. Several bedtime and overnight hypoglycemic episodes.  Rationale for plan: Tolerating increased dose of Mounjaro well. Patient did not decrease Lantus as previously discussed due to confusion with refilled Rx. Due to hypoglycemia, plan to reduce Lantus to 28 units BID and follow-up in 4 weeks.    Medication Changes:  DECREASE  Lantus 28 units twice daily  CONTINUE  Metformin 1,000mg twice daily  Jardiance 10mg once daily  Humalog 2 units + SS(2:50>150) with meals  Mounjaro 7.5mg once weekly         Relevant Medications    tirzepatide (Mounjaro) 7.5 mg/0.5 mL pen injector    Other Relevant Orders    Follow Up In [facility] - Pharmacy     Monitoring and Education:  Counseled patient on relevant MOA, expectations, side effects, duration of therapy, contraindications, administration, and monitoring parameters.  All questions and concerns addressed. Contact pharmacist with any further questions or concerns prior to next appointment.  Reviewed BG goals: Fasting BG goal , Postprandial BG goal <180 mg/dL, A1C goal <7%    Clinical Pharmacist follow-up: 9/10/24 at 1:30pm, Telehealth visit    Continue all meds under the continuation of care with the referring provider and clinical pharmacy team.    Thank you,  Sola Alcaraz, PharmD  Clinical Pharmacist  [phone]    Verbal consent to manage patient's drug therapy was obtained from the patient. They were informed they may decline to participate or withdraw from participation in pharmacy services at any time.

## 2024-08-14 ENCOUNTER — HOSPITAL ENCOUNTER (OUTPATIENT)
Dept: RADIOLOGY | Facility: HOSPITAL | Age: 79
Discharge: HOME | End: 2024-08-14
Payer: COMMERCIAL

## 2024-08-14 ENCOUNTER — PHARMACY VISIT (OUTPATIENT)
Dept: PHARMACY | Facility: CLINIC | Age: 79
End: 2024-08-14
Payer: COMMERCIAL

## 2024-08-14 ENCOUNTER — APPOINTMENT (OUTPATIENT)
Dept: ORTHOPEDIC SURGERY | Facility: CLINIC | Age: 79
End: 2024-08-14
Payer: COMMERCIAL

## 2024-08-14 DIAGNOSIS — S92.352A CLOSED FRACTURE OF BASE OF FIFTH METATARSAL BONE OF LEFT FOOT, INITIAL ENCOUNTER: ICD-10-CM

## 2024-08-14 DIAGNOSIS — S92.352A CLOSED FRACTURE OF BASE OF FIFTH METATARSAL BONE OF LEFT FOOT, INITIAL ENCOUNTER: Primary | ICD-10-CM

## 2024-08-14 PROCEDURE — L3260 AMBULATORY SURGICAL BOOT EAC: HCPCS | Performed by: STUDENT IN AN ORGANIZED HEALTH CARE EDUCATION/TRAINING PROGRAM

## 2024-08-14 PROCEDURE — 99024 POSTOP FOLLOW-UP VISIT: CPT | Performed by: STUDENT IN AN ORGANIZED HEALTH CARE EDUCATION/TRAINING PROGRAM

## 2024-08-14 PROCEDURE — 73630 X-RAY EXAM OF FOOT: CPT | Mod: LT

## 2024-08-14 NOTE — PROGRESS NOTES
Established follow-up patient Visit    HPI: Luba is a 79 y.o.female who presents today for follow-up of her left Roper fracture.  States that she is about 85% better.  She denies any interval injuries.  She denies any swelling or bruising.  She has been walking in the boot, and has not had any increased pain.    On 7/24/2024, she presented for 6-week follow-up of her left Roper fracture.  She states that she is feeling better and has been in her boot and minimally weightbearing.  She has been using her walker.  She denies any interval falls or injuries.  She feels like she is walking on a ball, however.  She denies any numbness and tingling.  She denies any significant swelling or bruising.  No other complaints or concerns at this time.    7/9/2024, she presented for follow-up of her left Roper fracture.  She states that she has been nonweightbearing with her walker.  States that she is feeling better.  She is here with her .  She no longer has any swelling or bruising.  She denies any interval injuries.  She has been wearing her boot.    On 6/28/2024, she presented with new complaints of left fifth metatarsal fracture.  She states that 2 weeks ago she twisted her ankle while walking and did not go to the ER until 6/25/2024.  She is found to have a left fifth metatarsal fracture at the metadiaphyseal junction.  She has been wearing a short boot.  She has been weightbearing.  She states that the pain is only when she is walking, but not at rest.  She denies any pain around the ankle or knee.  She denies any numbness and tingling.  She is here with her .    Plan: Today, on 8/14/2024, we will transition her into a postop shoe for the next 2 to 3 weeks.  She can start to wean out of this as she feels comfortable.  We will follow back up in 2 to 3 weeks without x-rays unless she is not doing better.    On 7/24/2024, after review of her x-rays which do show some good callus formation across the  fracture site, we will continue in her boot, but add weightbearing at this time for approximately 3-4 more weeks.  We will follow-up at that time with repeat x-rays of the left foot.  If she is doing well at that time, we will transition her into a postop shoe.    On 7/9/2024, for this left Roper fracture that shows some signs of interval healing and no change in alignment, we will continue with her nonweightbearing status with her walker and boot, and follow-up in approximately 2 weeks for repeat x-rays.  At this point we will be about 6 weeks out, and can possibly begin to transition into some weightbearing activity in her boot.    On 6/20/2024, for this left Roper fracture, we will place her in a boot, provide her with a walker so she can maintain nonweightbearing status, and we will repeat x-rays in 10 to 14 days to ensure that this is beginning to show signs of healing.  This will be a total of a 6 to 8-week nonweightbearing type injury to ensure proper healing.  Follow-up with repeat x-rays.    Assessment:   Problem List Items Addressed This Visit    None  Visit Diagnoses       Closed fracture of base of fifth metatarsal bone of left foot, initial encounter    -  Primary    Relevant Orders    XR foot left 3+ views    Post-op shoe            Diagnostics: X-rays reviewed and reveal a Roper fracture of the left fifth metatarsal, with signs of interval healing, but no change in alignment.      Procedure:  Procedures    Physical Exam:  GENERAL:  No obvious acute distress.  NEURO:  Distally neurovascularly intact.  Sensation intact to light touch.  Extremity: Left foot examination:  Skin is intact;  No erythema or warmth;  No obvious deformity;  No clinical signs of infection;  Still minimally TENDER over the base of the fifth metatarsal bone with no associated swelling and bruising today;  No pain in the midfoot;  No pain over the metatarsal bones;  No pain over the cuboid bone;  No pain over the calcaneus;  No  pain over the plantar aponeurosis;  No pain over the proximal phalanx of the right great toe;  No pain over distal phalanx of the right great toe; and  Neurovascularly intact.      Orders Placed This Encounter    Post-op shoe    XR foot left 3+ views      At the conclusion of the visit there were no further questions by the patient/family regarding their plan of care.  Patient was instructed to call or return with any issues, questions, or concerns regarding their injury and/or treatment plan described above.     08/14/24 at 2:04 PM - Samuel Henderson, DO    Office: [phone]    This note was prepared using voice recognition software.  The details of this note are correct and have been reviewed, and corrected to the best of my ability.  Some grammatical errors may persist related to the Dragon software.

## 2024-08-16 ENCOUNTER — TELEPHONE (OUTPATIENT)
Dept: PRIMARY CARE | Facility: CLINIC | Age: 79
End: 2024-08-16
Payer: COMMERCIAL

## 2024-08-16 DIAGNOSIS — I10 HYPERTENSION, UNSPECIFIED TYPE: ICD-10-CM

## 2024-08-16 RX ORDER — METOPROLOL TARTRATE 50 MG/1
50 TABLET ORAL 2 TIMES DAILY
Qty: 180 TABLET | Refills: 3 | Status: SHIPPED | OUTPATIENT
Start: 2024-08-16

## 2024-08-16 NOTE — ASSESSMENT & PLAN NOTE
Patient's A1c is 6.1% on 6/4/24. Goal A1c <7%.  Patient's SMBGs are not at goal, -140. Several bedtime and overnight hypoglycemic episodes.  Rationale for plan: Tolerating increased dose of Mounjaro well. Patient did not decrease Lantus as previously discussed due to confusion with refilled Rx. Due to hypoglycemia, plan to reduce Lantus to 28 units BID and follow-up in 4 weeks.    Medication Changes:  DECREASE  Lantus 28 units twice daily  CONTINUE  Metformin 1,000mg twice daily  Jardiance 10mg once daily  Humalog 2 units + SS(2:50>150) with meals  Mounjaro 7.5mg once weekly

## 2024-08-16 NOTE — TELEPHONE ENCOUNTER
Refill 90 day supply w/Refills  to [facility]      -metoprolol tartrate (Lopressor) 50 mg tablet 2XDAY  -DDM UNIFINE PENTIPS PLUS 4mm Use as directed 6xday. .[id number]

## 2024-08-27 PROCEDURE — RXMED WILLOW AMBULATORY MEDICATION CHARGE

## 2024-08-29 ENCOUNTER — APPOINTMENT (OUTPATIENT)
Dept: ORTHOPEDIC SURGERY | Facility: CLINIC | Age: 79
End: 2024-08-29
Payer: COMMERCIAL

## 2024-08-29 ENCOUNTER — HOSPITAL ENCOUNTER (OUTPATIENT)
Dept: RADIOLOGY | Facility: CLINIC | Age: 79
Discharge: HOME | End: 2024-08-29
Payer: COMMERCIAL

## 2024-08-29 ENCOUNTER — PHARMACY VISIT (OUTPATIENT)
Dept: PHARMACY | Facility: CLINIC | Age: 79
End: 2024-08-29
Payer: COMMERCIAL

## 2024-08-29 DIAGNOSIS — S92.352A CLOSED FRACTURE OF BASE OF FIFTH METATARSAL BONE OF LEFT FOOT, INITIAL ENCOUNTER: ICD-10-CM

## 2024-08-29 DIAGNOSIS — S92.352A CLOSED FRACTURE OF BASE OF FIFTH METATARSAL BONE OF LEFT FOOT, INITIAL ENCOUNTER: Primary | ICD-10-CM

## 2024-08-29 PROCEDURE — 73630 X-RAY EXAM OF FOOT: CPT | Mod: LEFT SIDE | Performed by: STUDENT IN AN ORGANIZED HEALTH CARE EDUCATION/TRAINING PROGRAM

## 2024-08-29 PROCEDURE — 73630 X-RAY EXAM OF FOOT: CPT | Mod: LT

## 2024-08-29 PROCEDURE — 99211 OFF/OP EST MAY X REQ PHY/QHP: CPT | Performed by: STUDENT IN AN ORGANIZED HEALTH CARE EDUCATION/TRAINING PROGRAM

## 2024-08-29 NOTE — PROGRESS NOTES
Established follow-up patient Visit    HPI: Luba is a 79 y.o.female who presents today for follow-up of her left Roper fracture.  States that she is greater than 90% better.  She denies any falls or injuries.  She has no pain.  She has still been wearing the postop shoe, but has purchased new shoes and has been starting to transition out of the postop shoe.    On 8/14/2024, she presented for follow-up of her left Roper fracture.  States that she is about 85% better.  She denies any interval injuries.  She denies any swelling or bruising.  She has been walking in the boot, and has not had any increased pain.    On 7/24/2024, she presented for 6-week follow-up of her left Roper fracture.  She states that she is feeling better and has been in her boot and minimally weightbearing.  She has been using her walker.  She denies any interval falls or injuries.  She feels like she is walking on a ball, however.  She denies any numbness and tingling.  She denies any significant swelling or bruising.  No other complaints or concerns at this time.    7/9/2024, she presented for follow-up of her left Roper fracture.  She states that she has been nonweightbearing with her walker.  States that she is feeling better.  She is here with her .  She no longer has any swelling or bruising.  She denies any interval injuries.  She has been wearing her boot.    On 6/28/2024, she presented with new complaints of left fifth metatarsal fracture.  She states that 2 weeks ago she twisted her ankle while walking and did not go to the ER until 6/25/2024.  She is found to have a left fifth metatarsal fracture at the metadiaphyseal junction.  She has been wearing a short boot.  She has been weightbearing.  She states that the pain is only when she is walking, but not at rest.  She denies any pain around the ankle or knee.  She denies any numbness and tingling.  She is here with her .    Plan: Today, on 8/29/2024, she can  transition back into her normal shoes and progress activities as tolerated.  Follow-up as needed.    On 8/14/2024, we will transition her into a postop shoe for the next 2 to 3 weeks.  She can start to wean out of this as she feels comfortable.  We will follow back up in 2 to 3 weeks without x-rays unless she is not doing better.    On 7/24/2024, after review of her x-rays which do show some good callus formation across the fracture site, we will continue in her boot, but add weightbearing at this time for approximately 3-4 more weeks.  We will follow-up at that time with repeat x-rays of the left foot.  If she is doing well at that time, we will transition her into a postop shoe.    On 7/9/2024, for this left Roper fracture that shows some signs of interval healing and no change in alignment, we will continue with her nonweightbearing status with her walker and boot, and follow-up in approximately 2 weeks for repeat x-rays.  At this point we will be about 6 weeks out, and can possibly begin to transition into some weightbearing activity in her boot.    On 6/20/2024, for this left Roper fracture, we will place her in a boot, provide her with a walker so she can maintain nonweightbearing status, and we will repeat x-rays in 10 to 14 days to ensure that this is beginning to show signs of healing.  This will be a total of a 6 to 8-week nonweightbearing type injury to ensure proper healing.  Follow-up with repeat x-rays.    Assessment:   Problem List Items Addressed This Visit    None  Visit Diagnoses       Closed fracture of base of fifth metatarsal bone of left foot, initial encounter    -  Primary    Relevant Orders    XR foot left 3+ views            Diagnostics: X-rays reviewed and reveal a Roper fracture of the left fifth metatarsal, with signs of interval healing, but no change in alignment.      Procedure:  Procedures    Physical Exam:  GENERAL:  No obvious acute distress.  NEURO:  Distally neurovascularly  intact.  Sensation intact to light touch.  Extremity: Left foot examination:  Skin is intact;  No erythema or warmth;  No obvious deformity;  No clinical signs of infection;  No pain over the base of the fifth metatarsal bone with no associated swelling and bruising today;  No pain in the midfoot;  No pain over the metatarsal bones;  No pain over the cuboid bone;  No pain over the calcaneus;  No pain over the plantar aponeurosis;  No pain over the proximal phalanx of the right great toe;  No pain over distal phalanx of the right great toe; and  Neurovascularly intact.      Orders Placed This Encounter    XR foot left 3+ views      At the conclusion of the visit there were no further questions by the patient/family regarding their plan of care.  Patient was instructed to call or return with any issues, questions, or concerns regarding their injury and/or treatment plan described above.     08/29/24 at 1:57 PM - Samuel Henderson DO    Office: [phone]    This note was prepared using voice recognition software.  The details of this note are correct and have been reviewed, and corrected to the best of my ability.  Some grammatical errors may persist related to the Dragon software.

## 2024-09-10 ENCOUNTER — APPOINTMENT (OUTPATIENT)
Dept: PHARMACY | Facility: HOSPITAL | Age: 79
End: 2024-09-10
Payer: COMMERCIAL

## 2024-09-10 DIAGNOSIS — Z79.4 TYPE 2 DIABETES MELLITUS WITH OTHER SPECIFIED COMPLICATION, WITH LONG-TERM CURRENT USE OF INSULIN (MULTI): ICD-10-CM

## 2024-09-10 DIAGNOSIS — E11.69 TYPE 2 DIABETES MELLITUS WITH OTHER SPECIFIED COMPLICATION, WITH LONG-TERM CURRENT USE OF INSULIN (MULTI): ICD-10-CM

## 2024-09-10 PROCEDURE — RXMED WILLOW AMBULATORY MEDICATION CHARGE

## 2024-09-10 RX ORDER — TIRZEPATIDE 10 MG/.5ML
10 INJECTION, SOLUTION SUBCUTANEOUS
Qty: 2 ML | Refills: 0 | Status: SHIPPED | OUTPATIENT
Start: 2024-09-10

## 2024-09-10 NOTE — PROGRESS NOTES
Clinical Pharmacy Appointment    Patient ID: Luba Cortez is a 79 y.o. female who presents for Type 2 Diabetes.    Pt is here for Follow Up appointment.   Referring Provider: Sampson Sheth, *  PCP: Sampson Sheth MD, last visit: 6/4/24, next visit: 11/26/24    Subjective   HPI  PMH significant for T2DM, glaucoma, HTN, HLD, obesity, CKD, gastroparesis.  Special needs/barriers to therapy: None identified    Medication System Management  Patients preferred pharmacy: [facility]  Adherence/Organization: No current concerns  Affordability/Accessibility: No current concerns, enrolled in PAP/VAF    Drug Interactions  No relevant drug interactions were noted.    DIABETES MELLITUS Type 2:    Known diabetic complications: retinopathy, autonomic neuropathy, chronic kidney disease, and obesity.  Hx or FH Hx of MTC/MEN2?: No, Pancreatitis?: No, Gastroparesis?: Yes, noted 2/2023 - have been slowly titrating Mounjaro and monitoring , UTI/Yeast Infections?: No    Follows with Endocrinology?: No  Diabetic Eye Exam: Following with ophthalmologist, Dr. Kevyn Ravi    Monofilament Foot Exam: Up to date, completed 6/4/24 per patient     Current diabetic medications include:  Jardiance 25mg daily  Metformin 1000mg BID  Lantus 28 units BID   Mounjaro 7.5mg once weekly  Previous medications: Rybelsus, Humalog    Adverse Effects: none    Glucose Readings:  Glucometer/CGM Type: FreeStyle Jasper 3    Previous home BG readings:  -140. After dinner and before bed BG 60's-70's.      Current home BG readings:   Review History --> Average Glucose (last option) --> Use arrows on bottom to change # of days  Review History --> Scroll to next page --> Time in Target (2nd option) Use arrows on bottom to change # of days  Average Glucose   7-day   Average 160   Overnight (12am - 6am) 173   Morning (6am - 12pm) 160   Afternoon (12pm - 6pm) 150   Evening (6pm-12am) 155     Time in Target   7-day   Above (>180) 24%   In Target  "() 76%   Below (<70) 0%     Any episodes of hypoglycemia? No   Did patient treat episode of hypoglycemia appropriately? N/A  Does the patient have a prescription for ready-to-use Glucagon? N/A    Risk Reducing Medications:  Statin? Yes - Atorvastatin 10mg daily   ACE-I/ARB? Yes - Valsartan 320mg daily     Preventative Care  Immunizations Needed: All up-to-date and documented  Tobacco Use: former smoker, quit 2011     Objective   Allergies   Allergen Reactions    Adhesive Tape-Silicones Unknown    Codeine Itching, Nausea Only, Other and Unknown     n/v    Iodinated Contrast Media Unknown    Iodine Unknown    Liraglutide Diarrhea, Nausea Only, Headache and Unknown     victoza     Social History     Social History Narrative    Not on file      Medication Review  Current Outpatient Medications   Medication Instructions    albuterol (Proventil HFA) 90 mcg/actuation inhaler 2 puffs, inhalation, Every 4 hours PRN    alcohol swabs pads, medicated Use as directed to check blood glucose once daily.    amLODIPine (NORVASC) 2.5 mg, oral, Daily    aspirin 81 mg EC tablet 1 tablet, oral, Daily    atorvastatin (LIPITOR) 10 mg, oral, Daily    BD Luer-Ella Syringe 3 mL 25 gauge x 1\" syringe use weekly with B-12 INJECTIONS as directed    blood sugar diagnostic (OneTouch Ultra Test) strip Use as directed to check blood glucose once daily.    blood-glucose meter (OneTouch Ultra2 Meter) misc Use as directed to monitor blood glucose daily.    calcium carbonate-vitamin D3 500 mg-5 mcg (200 unit) tablet 1 tablet, oral, Daily    cholecalciferol (VITAMIN D-3) 50,000 Units, oral, Once Weekly    cyanocobalamin (VITAMIN B-12) 1,000 mcg, intramuscular, Every 30 days    Debrox 6.5 % otic solution otic (ear)    dorzolamide-timolol, PF, (Cosopt, PF,) 2-0.5 % ophthalmic solution 1 drop, Both Eyes, 2 times daily    Droplet Pen Needle 32 gauge x 5/32\" needle use 1 PEN NEEDLE to inject MEDICATION subcutaneously six times a day    escitalopram " "(LEXAPRO) 10 mg, oral, Nightly    fenofibrate (TRICOR) 145 mg, oral, Daily    Jardiance 25 mg, oral, Daily    lancets (OneTouch Delica Plus Lancet) 33 gauge misc Use as directed to check blood glucose once daily.    Lantus Solostar U-100 Insulin 45 Units, subcutaneous, 2 times daily    levothyroxine (SYNTHROID, LEVOXYL) 100 mcg, oral, Daily, Take on an empty stomach at the same time each day, either 30 to 60 minutes prior to breakfast    metFORMIN (GLUCOPHAGE) 1,000 mg, oral, 2 times daily (morning and late afternoon)    metoprolol tartrate (LOPRESSOR) 50 mg, oral, 2 times daily    Mounjaro 7.5 mg, subcutaneous, Every 7 days    tafluprost, PF, 0.0015 % dropperette 1 drop, ophthalmic (eye), Nightly    tamoxifen (NOLVADEX) 20 mg, oral, Daily    valsartan (DIOVAN) 320 mg, oral, Daily      Vitals  BP Readings from Last 2 Encounters:   06/25/24 129/72   06/04/24 122/80     Labs  A1C  Lab Results   Component Value Date    HGBA1C 6.1 06/04/2024    HGBA1C 8.4 (A) 09/06/2023    HGBA1C 6.6 05/06/2021     BMP  Lab Results   Component Value Date    CALCIUM 9.8 06/04/2024     06/04/2024    K 4.3 06/04/2024    CO2 23 06/04/2024     (H) 06/04/2024    BUN 34 (H) 06/04/2024    CREATININE 1.06 (H) 06/04/2024    EGFR 54 (L) 06/04/2024     LFTs  Lab Results   Component Value Date    ALT 10 06/04/2024    AST 17 06/04/2024    ALKPHOS 69 06/04/2024    BILITOT 0.5 06/04/2024     FLP  Lab Results   Component Value Date    TRIG 163 (H) 09/12/2023    CHOL 104 09/12/2023    LDLF 43 09/12/2023    HDL 28.0 (A) 09/12/2023     Urine Microalbumin  No results found for: \"MICROALBCREA\"  Weight Management  Wt Readings from Last 3 Encounters:   06/28/24 87.5 kg (193 lb)   06/25/24 87.5 kg (193 lb)   06/04/24 88 kg (194 lb)      There is no height or weight on file to calculate BMI.     Assessment/Plan     Patient's A1c is 6.1% on 6/4/24. Goal A1c <7%.  Rationale for plan: CGM blood glucose in target 76% of time and patient reported having " no episodes of hypoglycemia. Plan to increase Mounjaro to 10mg once weekly and decrease Lantus to 52 units once daily (currently using 28 units twice daily = 56 total units daily).     Additional Comments:  Patient reported her weight at 183 lbs, which has decreased 10 lbs since 6/8/24 (193 lbs)  Expected to see further weight loss with increased Mounjaro dose.     Medication Changes:  Increase Mounjaro to 10mg once weekly  Decrease Lantus to 52 units once daily    Patient Education:  All questions and concerns addressed. Contact pharmacist with any further questions or concerns prior to next appointment.  Reviewed CGM BG goals: Target range , Time in Range Goal >70%, GMI goal <7%    Clinical Pharmacist follow-up: 10/8/24 1:30PM, Telehealth visit    Thank You,  Elvira Walker, PharmD  PGY-1 Pharmacy Resident    Continue all meds under the continuation of care with the referring provider and clinical pharmacy team.  Verbal consent to manage patient's drug therapy was obtained from the patient. They were informed they may decline to participate or withdraw from participation in pharmacy services at any time.

## 2024-09-11 ENCOUNTER — PHARMACY VISIT (OUTPATIENT)
Dept: PHARMACY | Facility: CLINIC | Age: 79
End: 2024-09-11
Payer: COMMERCIAL

## 2024-09-13 DIAGNOSIS — E11.9 TYPE 2 DIABETES MELLITUS WITHOUT COMPLICATION, WITHOUT LONG-TERM CURRENT USE OF INSULIN (MULTI): ICD-10-CM

## 2024-09-13 RX ORDER — METFORMIN HYDROCHLORIDE 1000 MG/1
1000 TABLET ORAL
Qty: 180 TABLET | Refills: 3 | Status: SHIPPED | OUTPATIENT
Start: 2024-09-13

## 2024-09-17 DIAGNOSIS — Z00.00 HEALTHCARE MAINTENANCE: ICD-10-CM

## 2024-09-17 DIAGNOSIS — I10 PRIMARY HYPERTENSION: ICD-10-CM

## 2024-09-17 DIAGNOSIS — E11.9 TYPE 2 DIABETES MELLITUS WITHOUT COMPLICATION, WITHOUT LONG-TERM CURRENT USE OF INSULIN (MULTI): ICD-10-CM

## 2024-09-17 RX ORDER — AMLODIPINE BESYLATE 2.5 MG/1
2.5 TABLET ORAL DAILY
Qty: 90 TABLET | Refills: 3 | Status: SHIPPED | OUTPATIENT
Start: 2024-09-17

## 2024-09-17 RX ORDER — PEN NEEDLE, DIABETIC 32GX 5/32"
NEEDLE, DISPOSABLE MISCELLANEOUS
Qty: 540 EACH | Refills: 3 | Status: SHIPPED | OUTPATIENT
Start: 2024-09-17

## 2024-09-17 RX ORDER — TAMOXIFEN CITRATE 20 MG/1
20 TABLET ORAL DAILY
Qty: 90 TABLET | Refills: 3 | Status: SHIPPED | OUTPATIENT
Start: 2024-09-17

## 2024-09-26 PROCEDURE — RXMED WILLOW AMBULATORY MEDICATION CHARGE

## 2024-09-30 ENCOUNTER — PHARMACY VISIT (OUTPATIENT)
Dept: PHARMACY | Facility: CLINIC | Age: 79
End: 2024-09-30
Payer: COMMERCIAL

## 2024-10-07 ENCOUNTER — APPOINTMENT (OUTPATIENT)
Dept: OPHTHALMOLOGY | Facility: CLINIC | Age: 79
End: 2024-10-07
Payer: COMMERCIAL

## 2024-10-07 DIAGNOSIS — M35.00 SICCA SYNDROME (MULTI): ICD-10-CM

## 2024-10-07 DIAGNOSIS — H35.371 EPIRETINAL MEMBRANE (ERM) OF RIGHT EYE: ICD-10-CM

## 2024-10-07 DIAGNOSIS — E08.3311: ICD-10-CM

## 2024-10-07 DIAGNOSIS — E11.3211 MILD NONPROLIFERATIVE DIABETIC RETINOPATHY OF RIGHT EYE WITH MACULAR EDEMA ASSOCIATED WITH TYPE 2 DIABETES MELLITUS: Primary | ICD-10-CM

## 2024-10-07 PROCEDURE — 99213 OFFICE O/P EST LOW 20 MIN: CPT | Performed by: OPHTHALMOLOGY

## 2024-10-07 PROCEDURE — 92134 CPTRZ OPH DX IMG PST SGM RTA: CPT | Mod: BILATERAL PROCEDURE | Performed by: OPHTHALMOLOGY

## 2024-10-07 ASSESSMENT — VISUAL ACUITY
CORRECTION_TYPE: GLASSES
OD_CC: 20/60
OS_CC: 20/25-2
METHOD: SNELLEN - LINEAR

## 2024-10-07 ASSESSMENT — EXTERNAL EXAM - RIGHT EYE: OD_EXAM: NORMAL

## 2024-10-07 ASSESSMENT — CUP TO DISC RATIO
OS_RATIO: 0.3
OD_RATIO: 0.6

## 2024-10-07 ASSESSMENT — SLIT LAMP EXAM - LIDS
COMMENTS: NORMAL
COMMENTS: NORMAL

## 2024-10-07 ASSESSMENT — TONOMETRY
OD_IOP_MMHG: 17
OS_IOP_MMHG: 16
IOP_METHOD: GOLDMANN APPLANATION

## 2024-10-07 ASSESSMENT — PACHYMETRY
OS_CT(UM): 551
OD_CT(UM): 562

## 2024-10-07 ASSESSMENT — EXTERNAL EXAM - LEFT EYE: OS_EXAM: NORMAL

## 2024-10-07 NOTE — PROGRESS NOTES
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  -appears stable  -Last CT abd pelvis 9/2017: no liver metastasis- small nodule that has not changed.    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 Healthy lifestyle.       4) OHTN OU, glaucoma suspect  -continue FU and gtts per Dr. Lim.     5 today has DME OD       TREAT RIGHT    Treatment options for DME OD discussed, including observation, anti-VEGF injections (including Avastin, Lucentis, Beovu, Vabysmo and Eylea) and laser. Recommend anti-VEGF injections. Eylea done OD today in a sterile manner with Betadine 5% for antisepsis.    Fu 1m

## 2024-10-08 ENCOUNTER — APPOINTMENT (OUTPATIENT)
Dept: PHARMACY | Facility: HOSPITAL | Age: 79
End: 2024-10-08
Payer: COMMERCIAL

## 2024-10-08 DIAGNOSIS — E11.69 TYPE 2 DIABETES MELLITUS WITH OTHER SPECIFIED COMPLICATION, WITH LONG-TERM CURRENT USE OF INSULIN: ICD-10-CM

## 2024-10-08 DIAGNOSIS — Z79.4 TYPE 2 DIABETES MELLITUS WITH OTHER SPECIFIED COMPLICATION, WITH LONG-TERM CURRENT USE OF INSULIN: ICD-10-CM

## 2024-10-08 DIAGNOSIS — E88.810 INSULIN RESISTANCE SYNDROME: Primary | ICD-10-CM

## 2024-10-08 DIAGNOSIS — Z86.39 HISTORY OF DIABETES MELLITUS: ICD-10-CM

## 2024-10-08 PROCEDURE — RXMED WILLOW AMBULATORY MEDICATION CHARGE

## 2024-10-08 RX ORDER — TIRZEPATIDE 12.5 MG/.5ML
12.5 INJECTION, SOLUTION SUBCUTANEOUS WEEKLY
Qty: 2 ML | Refills: 0 | Status: SHIPPED | OUTPATIENT
Start: 2024-10-08

## 2024-10-08 NOTE — PROGRESS NOTES
Clinical Pharmacy Appointment    Patient ID: Luba Cortez is a 79 y.o. female who presents for Type 2 Diabetes Mellitus.    Pt is here for Follow Up appointment.   Referring Provider: Sampson Sheth, *  PCP: Sampson Sheth MD, last visit: 6/4/24, next visit: 11/26/24    Subjective   HPI  PMH significant for T2DM, glaucoma, HTN, HLD, obesity, CKD, gastroparesis.  Special needs/barriers to therapy: None identified    Medication System Management  Patients preferred pharmacy: [facility] home delivery  Adherence/Organization: No current concerns  Affordability/Accessibility: No current concerns, enrolled in [facility]    Drug Interactions  No relevant drug interactions were noted.    DIABETES MELLITUS Type 2:    Known diabetic complications: retinopathy, autonomic neuropathy, chronic kidney disease, and obesity.  Hx or FH Hx of MTC/MEN2?: No   Pancreatitis?: No   Gastroparesis?: Yes, noted 2/2023 - have been slowly titrating Mounjaro and monitoring    UTI/Yeast Infections?: No    Follows with Endocrinology?: No  Diabetic Eye Exam: Following with ophthalmologist, Dr. Kevyn Ravi  Monofilament Foot Exam: Up to date, completed 6/4/24     Current diabetic medications include:  Jardiance 25mg daily  Lantus 52 units daily  Metformin 1000mg BID   Mounjaro 10mg once weekly (Wednesday)  Previous medications: Rybelsus, Humalog    Clarifications to above regimen: none  Adverse Effects: none    Glucose Readings:  Glucometer/CGM Type: FreeStyle Jasper 3    Previous home BG readings: (9/10/24)  Average Glucose    7-day   Average 160   Overnight (12am - 6am) 173   Morning (6am - 12pm) 160   Afternoon (12pm - 6pm) 150   Evening (6pm-12am) 155      Time in Target    7-day   Above (>180) 24%   In Target () 76%   Below (<70) 0%       Current home BG readings:   Review History --> Average Glucose (last option) --> Use arrows on bottom to change # of days  Review History --> Scroll to next page --> Time in Target (2nd  "option) Use arrows on bottom to change # of days  Average Glucose   7-day 14-day 30-day   Average 172 171 168   Overnight (12am - 6am) 180 179 179   Morning (6am - 12pm) 158 158 156   Afternoon (12pm - 6pm) 172 173 169   Evening (6pm-12am) 177 172 168     Time in Target   7-day 14-day 30-day   Above (>180) 35% 33% 33%   In Target () 65% 67% 67%   Below (<70) 0% 0% 0%       Any episodes of hypoglycemia? No   Did patient treat episode of hypoglycemia appropriately? N/A  Does the patient have a prescription for ready-to-use Glucagon? N/A    Risk Reducing Medications:  Statin? Yes, Atorvastatin 10mg daily  ACE-I/ARB? Yes, Valsartan 320mg daily    Preventative Care  Immunizations Needed: All up-to-date and documented  Tobacco Use: former smoker, quit 2011     Objective   Allergies   Allergen Reactions    Adhesive Tape-Silicones Unknown    Codeine Itching, Nausea Only, Other and Unknown     n/v    Iodinated Contrast Media Unknown    Iodine Unknown    Liraglutide Diarrhea, Nausea Only, Headache and Unknown     victoza     Social History     Social History Narrative    Not on file      Medication Review  Current Outpatient Medications   Medication Instructions    albuterol (Proventil HFA) 90 mcg/actuation inhaler 2 puffs, inhalation, Every 4 hours PRN    alcohol swabs pads, medicated Use as directed to check blood glucose once daily.    amLODIPine (NORVASC) 2.5 mg, oral, Daily    aspirin 81 mg EC tablet 1 tablet, oral, Daily    atorvastatin (LIPITOR) 10 mg, oral, Daily    BD Luer-Ella Syringe 3 mL 25 gauge x 1\" syringe use weekly with B-12 INJECTIONS as directed    blood sugar diagnostic (OneTouch Ultra Test) strip Use as directed to check blood glucose once daily.    blood-glucose meter (OneTouch Ultra2 Meter) misc Use as directed to monitor blood glucose daily.    calcium carbonate-vitamin D3 500 mg-5 mcg (200 unit) tablet 1 tablet, oral, Daily    cholecalciferol (VITAMIN D-3) 50,000 Units, oral, Once Weekly    " "cyanocobalamin (VITAMIN B-12) 1,000 mcg, intramuscular, Every 30 days    Debrox 6.5 % otic solution otic (ear)    dorzolamide-timolol, PF, (Cosopt, PF,) 2-0.5 % ophthalmic solution 1 drop, Both Eyes, 2 times daily    Droplet Pen Needle 32 gauge x 5/32\" needle use 1 PEN NEEDLE to inject MEDICATION subcutaneously six times a day    escitalopram (LEXAPRO) 10 mg, oral, Nightly    fenofibrate (TRICOR) 145 mg, oral, Daily    Jardiance 25 mg, oral, Daily    lancets (OneTouch Delica Plus Lancet) 33 gauge misc Use as directed to check blood glucose once daily.    Lantus Solostar U-100 Insulin 45 Units, subcutaneous, 2 times daily    levothyroxine (SYNTHROID, LEVOXYL) 100 mcg, oral, Daily, Take on an empty stomach at the same time each day, either 30 to 60 minutes prior to breakfast    metFORMIN (GLUCOPHAGE) 1,000 mg, oral, 2 times daily (morning and late afternoon)    metoprolol tartrate (LOPRESSOR) 50 mg, oral, 2 times daily    Mounjaro 10 mg, subcutaneous, Every 7 days    tafluprost, PF, 0.0015 % dropperette 1 drop, ophthalmic (eye), Nightly    tamoxifen (NOLVADEX) 20 mg, oral, Daily    valsartan (DIOVAN) 320 mg, oral, Daily      Vitals  BP Readings from Last 2 Encounters:   06/25/24 129/72   06/04/24 122/80     Labs  A1C  Lab Results   Component Value Date    HGBA1C 6.1 06/04/2024    HGBA1C 8.4 (A) 09/06/2023    HGBA1C 6.6 05/06/2021     BMP  Lab Results   Component Value Date    CALCIUM 9.8 06/04/2024     06/04/2024    K 4.3 06/04/2024    CO2 23 06/04/2024     (H) 06/04/2024    BUN 34 (H) 06/04/2024    CREATININE 1.06 (H) 06/04/2024    EGFR 54 (L) 06/04/2024     LFTs  Lab Results   Component Value Date    ALT 10 06/04/2024    AST 17 06/04/2024    ALKPHOS 69 06/04/2024    BILITOT 0.5 06/04/2024     FLP  Lab Results   Component Value Date    TRIG 163 (H) 09/12/2023    CHOL 104 09/12/2023    LDLF 43 09/12/2023    HDL 28.0 (A) 09/12/2023     Urine Microalbumin  No results found for: \"MICROALBCREA\"  Weight " Management  Wt Readings from Last 3 Encounters:   06/28/24 87.5 kg (193 lb)   06/25/24 87.5 kg (193 lb)   06/04/24 88 kg (194 lb)      There is no height or weight on file to calculate BMI.     Assessment/Plan     DIABETES MELLITUS:   Patient's A1c is 6.1% on 6/4/24. Goal A1c <7%.  Patient's SMBGs are not controlled, with 30 day avg 168 and time in target 67%.     Rationale for plan: Patient's BG are elevated from last appointment. Plan to increase Mounjaro to 12.5mg once weekly for further glycemic control and additional weight loss benefits.     Medication Changes:  Increase: Mounjaro from 10mg once weekly to 12.5mg once weekly     Patient Education:  Counseled patient on relevant MOA, expectations, side effects, duration of therapy, contraindications, administration, and monitoring parameters.  All questions and concerns addressed. Contact pharmacist with any further questions or concerns prior to next appointment.  Reviewed CGM BG goals: Target range , Time in Range Goal >70%, GMI goal <7%    Clinical Pharmacist follow-up: 10/28/24 1:30PM, Telehealth visit    Thank You,  Elvira Walker, PharmD  PGY-1 Pharmacy Resident    Continue all meds under the continuation of care with the referring provider and clinical pharmacy team.  Verbal consent to manage patient's drug therapy was obtained from the patient. They were informed they may decline to participate or withdraw from participation in pharmacy services at any time.

## 2024-10-10 ENCOUNTER — PHARMACY VISIT (OUTPATIENT)
Dept: PHARMACY | Facility: CLINIC | Age: 79
End: 2024-10-10
Payer: COMMERCIAL

## 2024-10-18 PROCEDURE — RXMED WILLOW AMBULATORY MEDICATION CHARGE

## 2024-10-21 ENCOUNTER — PHARMACY VISIT (OUTPATIENT)
Dept: PHARMACY | Facility: CLINIC | Age: 79
End: 2024-10-21
Payer: COMMERCIAL

## 2024-10-25 PROCEDURE — RXMED WILLOW AMBULATORY MEDICATION CHARGE

## 2024-10-28 ENCOUNTER — APPOINTMENT (OUTPATIENT)
Dept: PHARMACY | Facility: HOSPITAL | Age: 79
End: 2024-10-28
Payer: COMMERCIAL

## 2024-10-28 DIAGNOSIS — Z86.39 HISTORY OF DIABETES MELLITUS: ICD-10-CM

## 2024-10-28 DIAGNOSIS — Z79.4 TYPE 2 DIABETES MELLITUS WITH OTHER SPECIFIED COMPLICATION, WITH LONG-TERM CURRENT USE OF INSULIN: Primary | ICD-10-CM

## 2024-10-28 DIAGNOSIS — E88.810 INSULIN RESISTANCE SYNDROME: ICD-10-CM

## 2024-10-28 DIAGNOSIS — E11.69 TYPE 2 DIABETES MELLITUS WITH OTHER SPECIFIED COMPLICATION, WITH LONG-TERM CURRENT USE OF INSULIN: Primary | ICD-10-CM

## 2024-10-28 PROCEDURE — RXMED WILLOW AMBULATORY MEDICATION CHARGE

## 2024-10-28 RX ORDER — TIRZEPATIDE 15 MG/.5ML
15 INJECTION, SOLUTION SUBCUTANEOUS WEEKLY
Qty: 2 ML | Refills: 0 | Status: SHIPPED | OUTPATIENT
Start: 2024-10-28

## 2024-10-29 ENCOUNTER — PHARMACY VISIT (OUTPATIENT)
Dept: PHARMACY | Facility: CLINIC | Age: 79
End: 2024-10-29
Payer: COMMERCIAL

## 2024-10-30 ENCOUNTER — PHARMACY VISIT (OUTPATIENT)
Dept: PHARMACY | Facility: CLINIC | Age: 79
End: 2024-10-30
Payer: COMMERCIAL

## 2024-11-19 ENCOUNTER — TELEPHONE (OUTPATIENT)
Dept: PHARMACY | Facility: HOSPITAL | Age: 79
End: 2024-11-19
Payer: COMMERCIAL

## 2024-11-19 DIAGNOSIS — E11.65 TYPE 2 DIABETES MELLITUS WITH HYPERGLYCEMIA, WITHOUT LONG-TERM CURRENT USE OF INSULIN: Primary | ICD-10-CM

## 2024-11-19 PROCEDURE — RXMED WILLOW AMBULATORY MEDICATION CHARGE

## 2024-11-19 RX ORDER — TIRZEPATIDE 12.5 MG/.5ML
12.5 INJECTION, SOLUTION SUBCUTANEOUS
Qty: 2 ML | Refills: 0 | Status: SHIPPED | OUTPATIENT
Start: 2024-11-19

## 2024-11-19 NOTE — TELEPHONE ENCOUNTER
"Clinical Pharmacist Consult  Luba Cortez is a 79 y.o. female referred to pharmacy for management of diabetes.  Referring Provider: Sampson Sheth MD    Patient called to report that she is experiencing significant adverse effects with dose increase of Mounjaro. Patient took increased dose of Mounjaro 15mg on Wednesday and experiencing some nausea following. She reports that yesterday she began experiencing severe nausea and vomiting, pain in her mid-abdomen, and belching. Overnight, she experienced an episode of loose stools. This morning, she has no pain or nausea, but is feeling \"washed out\". No reported signs/symptoms concerning for pancreatitis or cholecystitis.    Due to significant adverse effects on first dose of Mounjaro 15mg, plan to hold scheduled dose for tomorrow, 11/20, and reduce dose to 12.5mg starting Wednesday 11/27/24. Follow-up as scheduled.    Medication Changes:  DECREASE  Hold next dose of Mounjaro (11/20/24)  Reduce dose to Mounjaro 12.5mg once weekly starting 11/27/24.      Sola Alcaraz, PharmD  Clinical Pharmacist  [phone]    Continue all meds under the continuation of care with the referring provider and clinical pharmacy team.  Verbal consent to manage patient's drug therapy was obtained from the patient. They were informed they may decline to participate or withdraw from participation in pharmacy services at any time.  "

## 2024-11-21 ENCOUNTER — PHARMACY VISIT (OUTPATIENT)
Dept: PHARMACY | Facility: CLINIC | Age: 79
End: 2024-11-21
Payer: COMMERCIAL

## 2024-11-25 ENCOUNTER — APPOINTMENT (OUTPATIENT)
Dept: PHARMACY | Facility: HOSPITAL | Age: 79
End: 2024-11-25
Payer: COMMERCIAL

## 2024-11-26 ENCOUNTER — APPOINTMENT (OUTPATIENT)
Dept: PRIMARY CARE | Facility: CLINIC | Age: 79
End: 2024-11-26
Payer: MEDICARE

## 2024-11-26 VITALS
WEIGHT: 179 LBS | DIASTOLIC BLOOD PRESSURE: 74 MMHG | SYSTOLIC BLOOD PRESSURE: 126 MMHG | BODY MASS INDEX: 28.09 KG/M2 | RESPIRATION RATE: 14 BRPM | HEIGHT: 67 IN

## 2024-11-26 DIAGNOSIS — Z12.31 ENCOUNTER FOR SCREENING MAMMOGRAM FOR BREAST CANCER: ICD-10-CM

## 2024-11-26 DIAGNOSIS — E11.9 TYPE 2 DIABETES MELLITUS WITHOUT COMPLICATION, WITH LONG-TERM CURRENT USE OF INSULIN (MULTI): ICD-10-CM

## 2024-11-26 DIAGNOSIS — Z79.4 TYPE 2 DIABETES MELLITUS WITHOUT COMPLICATION, WITH LONG-TERM CURRENT USE OF INSULIN (MULTI): ICD-10-CM

## 2024-11-26 DIAGNOSIS — D51.1 VIT B12 DEFIC ANEMIA D/T SLCTV VIT B12 MALABSORP W PROTEIN: ICD-10-CM

## 2024-11-26 DIAGNOSIS — E03.9 HYPOTHYROIDISM, UNSPECIFIED TYPE: ICD-10-CM

## 2024-11-26 DIAGNOSIS — E53.8 VITAMIN B12 DEFICIENCY: ICD-10-CM

## 2024-11-26 DIAGNOSIS — Z00.00 MEDICARE ANNUAL WELLNESS VISIT, SUBSEQUENT: Primary | ICD-10-CM

## 2024-11-26 DIAGNOSIS — R11.2 NAUSEA AND VOMITING, UNSPECIFIED VOMITING TYPE: ICD-10-CM

## 2024-11-26 LAB — POC HEMOGLOBIN A1C: 6.5 % (ref 4.2–6.5)

## 2024-11-26 PROCEDURE — 1170F FXNL STATUS ASSESSED: CPT | Performed by: INTERNAL MEDICINE

## 2024-11-26 PROCEDURE — 3078F DIAST BP <80 MM HG: CPT | Performed by: INTERNAL MEDICINE

## 2024-11-26 PROCEDURE — G0439 PPPS, SUBSEQ VISIT: HCPCS | Performed by: INTERNAL MEDICINE

## 2024-11-26 PROCEDURE — 3074F SYST BP LT 130 MM HG: CPT | Performed by: INTERNAL MEDICINE

## 2024-11-26 PROCEDURE — 99212 OFFICE O/P EST SF 10 MIN: CPT | Performed by: INTERNAL MEDICINE

## 2024-11-26 PROCEDURE — 1124F ACP DISCUSS-NO DSCNMKR DOCD: CPT | Performed by: INTERNAL MEDICINE

## 2024-11-26 PROCEDURE — 83036 HEMOGLOBIN GLYCOSYLATED A1C: CPT | Performed by: INTERNAL MEDICINE

## 2024-11-26 ASSESSMENT — ACTIVITIES OF DAILY LIVING (ADL)
GROCERY_SHOPPING: INDEPENDENT
DRESSING: INDEPENDENT
DOING_HOUSEWORK: INDEPENDENT
BATHING: INDEPENDENT
TAKING_MEDICATION: INDEPENDENT
MANAGING_FINANCES: INDEPENDENT

## 2024-11-26 ASSESSMENT — ENCOUNTER SYMPTOMS
JOINT SWELLING: 0
NAUSEA: 1
MYALGIAS: 0
SHORTNESS OF BREATH: 0
FEVER: 0
CONSTIPATION: 0
COUGH: 0
ENDOCRINE COMMENTS: PER HPI
CHILLS: 0
DIAPHORESIS: 0
DIARRHEA: 0
VOMITING: 0

## 2024-11-26 NOTE — PATIENT INSTRUCTIONS
I RECOMMEND WE CHECK LABS FIRST TO EXCLUDE THE UNLIKELY POSSIBILITY OF PANCREATITIS AS A COMPLICATION OF MOUNJAro.  IF LABS COME BACK OK, THEN RESUME THE LOWER DOSE 12.5 MG WEEKLY AS RECOMMENDED BY DR. PARSON    2.  SCREENING MAMMOGRAM ORDERED FOR YOU    3.  YOU ARE OTHERWISE CAUGHT UP FROM A MEDICARE STANDPOINT FOR HEALTH SCREENING AND MAINTENANCE    4.  I ENCOURAGE STARTING A WALKING REGIMEN SLOWLY AND TRY TO EVENTUALLY WORK UP TO 4 TIMES A WEEK 30 MINUTES AT A TIME    5.  A1C TODAY 6.5% = EXCELLENT DIABETIC CONTROL ON PRESENT REGIMEN    6.  FOLLOW UP 3 MONTHS OR IF NEEDED SHOULD YOUR GI TROUBLES NOT RESOLVED IN NEXT FEW WEEKS, OR CERTAINLY IF YOU GET SICK WHEN RESUMING LOWER DOSE MOUNJARO.

## 2024-11-26 NOTE — ASSESSMENT & PLAN NOTE
Orders:    POCT glycosylated hemoglobin (Hb A1C) manually resulted    Comprehensive Metabolic Panel; Future    CBC; Future

## 2024-11-26 NOTE — PROGRESS NOTES
"Subjective   Reason for Visit: Luba Cortez is an 79 y.o. female here for a Medicare Wellness visit.    Started mounjaro on 11/13 says made nauseous loose stools     hasn't felt right since 1st dose   Had flu and covid          HPI  Was OK UNTIL STARTED 15 MG, GOT EVERY SIDE EFFECT    GOING BACK UNTIL TOMORROW    FEEL NAUSEATED BUT DON'T VOMIT    FEEL DIZZY AND WEAK, BURPING EXCESSIVELY, AND PAIN IN RIGHT UPPER QUADRANT.    TAKE LANTUS 52 UNITS AT NIGHT      Patient Care Team:  Sampson Sheth MD as PCP - General (Internal Medicine)  Medardo Diaz MD as PCP - [employer] PCP     Review of Systems   Constitutional:  Negative for chills, diaphoresis and fever.   Respiratory:  Negative for cough and shortness of breath.    Cardiovascular:  Negative for chest pain and leg swelling.   Gastrointestinal:  Positive for nausea. Negative for constipation, diarrhea and vomiting.   Endocrine:        PER HPI   Musculoskeletal:  Negative for joint swelling and myalgias.       Objective   Vitals:  /74   Resp 14   Ht 1.702 m (5' 7\")   Wt 81.2 kg (179 lb)   BMI 28.04 kg/m²       Physical Exam  Vitals reviewed.   Constitutional:       General: She is not in acute distress.     Appearance: She is not ill-appearing.   Eyes:      Comments: RIGHT LATERAL SCLERA S/P SCLERAL TRANSPLANT   Cardiovascular:      Rate and Rhythm: Normal rate and regular rhythm.      Pulses: Normal pulses.      Heart sounds:      No gallop.   Pulmonary:      Breath sounds: Normal breath sounds. No wheezing, rhonchi or rales.   Abdominal:      General: Abdomen is flat. Bowel sounds are normal.      Palpations: Abdomen is soft.      Tenderness: There is no guarding or rebound.   Musculoskeletal:      Right lower leg: No edema.      Left lower leg: No edema.   Skin:     Coloration: Skin is not jaundiced.         Assessment & Plan  Type 2 diabetes mellitus without complication, with long-term current use of insulin " (Multi)    Orders:    POCT glycosylated hemoglobin (Hb A1C) manually resulted    Comprehensive Metabolic Panel; Future    CBC; Future    Encounter for screening mammogram for breast cancer    Orders:    BI mammo bilateral screening tomosynthesis; Future    Nausea and vomiting, unspecified vomiting type    Orders:    Lipase; Future    Hypothyroidism, unspecified type    Orders:    TSH with reflex to Free T4 if abnormal; Future    Vit B12 defic anemia d/t slctv vit B12 malabsorp w protein         Vitamin B12 deficiency    Orders:    Vitamin B12; Future    Medicare annual wellness visit, subsequent            Patient Instructions    I RECOMMEND WE CHECK LABS FIRST TO EXCLUDE THE UNLIKELY POSSIBILITY OF PANCREATITIS AS A COMPLICATION OF MOUNJAro.  IF LABS COME BACK OK, THEN RESUME THE LOWER DOSE 12.5 MG WEEKLY AS RECOMMENDED BY DR. PARSON    2.  SCREENING MAMMOGRAM ORDERED FOR YOU    3.  YOU ARE OTHERWISE CAUGHT UP FROM A MEDICARE STANDPOINT FOR HEALTH SCREENING AND MAINTENANCE    4.  I ENCOURAGE STARTING A WALKING REGIMEN SLOWLY AND TRY TO EVENTUALLY WORK UP TO 4 TIMES A WEEK 30 MINUTES AT A TIME    5.  A1C TODAY 6.5% = EXCELLENT DIABETIC CONTROL ON PRESENT REGIMEN    6.  FOLLOW UP 3 MONTHS OR IF NEEDED SHOULD YOUR GI TROUBLES NOT RESOLVED IN NEXT FEW WEEKS, OR CERTAINLY IF YOU GET SICK WHEN RESUMING LOWER DOSE MOUNJARO.

## 2024-11-27 ENCOUNTER — TELEMEDICINE (OUTPATIENT)
Dept: PHARMACY | Facility: HOSPITAL | Age: 79
End: 2024-11-27
Payer: COMMERCIAL

## 2024-11-27 DIAGNOSIS — Z79.4 TYPE 2 DIABETES MELLITUS WITH OTHER SPECIFIED COMPLICATION, WITH LONG-TERM CURRENT USE OF INSULIN: ICD-10-CM

## 2024-11-27 DIAGNOSIS — E11.69 TYPE 2 DIABETES MELLITUS WITH OTHER SPECIFIED COMPLICATION, WITH LONG-TERM CURRENT USE OF INSULIN: ICD-10-CM

## 2024-11-27 NOTE — PROGRESS NOTES
Clinical Pharmacy Appointment    Patient ID: Luba Cortez is a 79 y.o. female who presents for Diabetes.    Pt is here for Follow Up appointment.   Referring Provider: Sampson Sheth, *  PCP: Sampson Sheth MD, last visit: 11/26/24, next visit: 2/26/24    Subjective   HPI  PMH significant for T2DM, gastroparesis, HTN, HLD, hypothyroid, GERD, glaucoma, retinopathy.   Special needs/barriers to therapy: None identified     Patient Assistance for Mounjaro, Humalog, and Lantus approved through 5/3/25. Will have to be renewed prior to that date to prevent lapse in coverage. Medication(s) will be received at no cost to patient from [facility].    Drug Interactions  No significant drug interactions were noted.    Medication System Management  Patients preferred pharmacy: [facility]  Adherence/Organization: No current concerns  Affordability/Accessibility: No current concerns, enrolled in  PAP      DIABETES MELLITUS Type 2:    Known diabetic complications: retinopathy and autonomic neuropathy.  Hx or FH Hx of MTC/MEN2?: No, Pancreatitis?: No, Gastroparesis?: No, UTI/Yeast Infections?: No    Follows with Endocrinology?: No  Diabetic Eye Exam: Following with ophthalmology  Monofilament Foot Exam: Needs completed, last unknown     Current diabetic medications include:  Jardiance 25mg daily  Lantus 52 units daily  Metformin 1000mg BID  Mounjaro 12.5mg - Held until labs completed  Previous medications: Rybelsus (ineffective)     Clarifications to above regimen: None  Adverse Effects: some lingering nausea, low appetite. Overall feeling better. Notes that nausea is improved after she eats.    Glucose Readings:  Glucometer/CGM Type: Freestyle Jasper 3     Previous home BG readings: (10/28/24) 7-day Avg , TIR 71%   Current home BG readings:     Any episodes of hypoglycemia? No  Did patient treat episode of hypoglycemia appropriately? N/A  Does the patient have a prescription for  "ready-to-use Glucagon? No      Lifestyle:  Diet: No significant changes  Physical Activity: No significant changes    Risk Reducing Medications:  Statin? Yes  ACE-I/ARB? Yes    Preventative Care  Immunizations Needed: All up-to-date and documented  Tobacco Use: former smoker, quit 2011     Objective   Allergies   Allergen Reactions    Adhesive Tape-Silicones Unknown    Codeine Itching, Nausea Only, Other and Unknown     n/v    Iodinated Contrast Media Unknown    Iodine Unknown    Liraglutide Diarrhea, Nausea Only, Headache and Unknown     victoza     Social History     Social History Narrative    Not on file      Medication Review  Current Outpatient Medications   Medication Instructions    albuterol (Proventil HFA) 90 mcg/actuation inhaler 2 puffs, inhalation, Every 4 hours PRN    alcohol swabs pads, medicated Use as directed to check blood glucose once daily.    amLODIPine (NORVASC) 2.5 mg, oral, Daily    aspirin 81 mg EC tablet 1 tablet, oral, Daily    atorvastatin (LIPITOR) 10 mg, oral, Daily    BD Luer-Ella Syringe 3 mL 25 gauge x 1\" syringe use weekly with B-12 INJECTIONS as directed    blood sugar diagnostic (OneTouch Ultra Test) strip Use as directed to check blood glucose once daily.    blood-glucose meter (OneTouch Ultra2 Meter) misc Use as directed to monitor blood glucose daily.    calcium carbonate-vitamin D3 500 mg-5 mcg (200 unit) tablet 1 tablet, oral, Daily    cholecalciferol (VITAMIN D-3) 50,000 Units, oral, Once Weekly    cyanocobalamin (VITAMIN B-12) 1,000 mcg, intramuscular, Every 30 days    Debrox 6.5 % otic solution otic (ear)    dorzolamide-timolol, PF, (Cosopt, PF,) 2-0.5 % ophthalmic solution 1 drop, Both Eyes, 2 times daily    Droplet Pen Needle 32 gauge x 5/32\" needle use 1 PEN NEEDLE to inject MEDICATION subcutaneously six times a day    escitalopram (LEXAPRO) 10 mg, oral, Nightly    fenofibrate (TRICOR) 145 mg, oral, Daily    Jardiance 25 mg, oral, Daily    lancets (OneTouch Delica Plus " "Lancet) 33 gauge misc Use as directed to check blood glucose once daily.    Lantus Solostar U-100 Insulin 45 Units, subcutaneous, 2 times daily    levothyroxine (SYNTHROID, LEVOXYL) 100 mcg, oral, Daily, Take on an empty stomach at the same time each day, either 30 to 60 minutes prior to breakfast    metFORMIN (GLUCOPHAGE) 1,000 mg, oral, 2 times daily (morning and late afternoon)    metoprolol tartrate (LOPRESSOR) 50 mg, oral, 2 times daily    Mounjaro 12.5 mg, subcutaneous, Every 7 days    tafluprost, PF, 0.0015 % dropperette 1 drop, ophthalmic (eye), Nightly    tamoxifen (NOLVADEX) 20 mg, oral, Daily    valsartan (DIOVAN) 320 mg, oral, Daily      Vitals  BP Readings from Last 2 Encounters:   11/26/24 126/74   06/25/24 129/72     BMI Readings from Last 1 Encounters:   11/26/24 28.04 kg/m²      Labs  A1C  Lab Results   Component Value Date    HGBA1C 6.5 11/26/2024    HGBA1C 6.1 06/04/2024    HGBA1C 8.4 (A) 09/06/2023     BMP  Lab Results   Component Value Date    CALCIUM 9.8 06/04/2024     06/04/2024    K 4.3 06/04/2024    CO2 23 06/04/2024     (H) 06/04/2024    BUN 34 (H) 06/04/2024    CREATININE 1.06 (H) 06/04/2024    EGFR 54 (L) 06/04/2024     LFTs  Lab Results   Component Value Date    ALT 10 06/04/2024    AST 17 06/04/2024    ALKPHOS 69 06/04/2024    BILITOT 0.5 06/04/2024     FLP  Lab Results   Component Value Date    TRIG 163 (H) 09/12/2023    CHOL 104 09/12/2023    LDLF 43 09/12/2023    HDL 28.0 (A) 09/12/2023     Urine Microalbumin  No results found for: \"MICROALBCREA\"  Weight Management  Wt Readings from Last 3 Encounters:   11/26/24 81.2 kg (179 lb)   06/28/24 87.5 kg (193 lb)   06/25/24 87.5 kg (193 lb)      There is no height or weight on file to calculate BMI.     Assessment/Plan   Problem List Items Addressed This Visit       Diabetes mellitus, type 2 (Multi)     Patient's A1c is 6.5% on 11/26/24. Goal A1c <7%.  Patient's SMBGs are elevated, reports .     Rationale for plan: Patient " is to continue holding Mounjaro until labs are completed to rule out pancreatitis or other complications, then will plan to resume at 12.5mg dose. Still experiencing some lingering nausea, low appetite, and fatigue, but she is overall feeling better. Notes that nausea is improved after she eats. Encouraged to maintain good nutrition habits. Follow-up regarding lab results and in 1 month.    Medication Changes:  CONTINUE  Jardiance 25mg daily  Lantus 52 units daily  Metformin 1000mg BID    Additional Instructions  Complete pending lab orders. Pharmacist or PCP will contact regarding results and to discuss resuming Mounjaro         Relevant Orders    Referral to Clinical Pharmacy     Monitoring and Education:  Counseled patient on relevant MOA, expectations, side effects, duration of therapy, contraindications, administration, and monitoring parameters.  All questions and concerns addressed. Contact pharmacist with any further questions or concerns prior to next appointment.    Clinical Pharmacist follow-up: 12/26/24 at 10:20 am, Telehealth visit    Continue all meds under the continuation of care with the referring provider and clinical pharmacy team.    Thank you,  Sola Alcaraz, PharmD  Clinical Pharmacist  [phone]    Verbal consent to manage patient's drug therapy was obtained from the patient. They were informed they may decline to participate or withdraw from participation in pharmacy services at any time.

## 2024-11-27 NOTE — ASSESSMENT & PLAN NOTE
Patient's A1c is 6.5% on 11/26/24. Goal A1c <7%.  Patient's SMBGs are elevated, reports .     Rationale for plan: Patient is to continue holding Mounjaro until labs are completed to rule out pancreatitis or other complications, then will plan to resume at 12.5mg dose. Still experiencing some lingering nausea, low appetite, and fatigue, but she is overall feeling better. Notes that nausea is improved after she eats. Encouraged to maintain good nutrition habits. Follow-up regarding lab results and in 1 month.    Medication Changes:  CONTINUE  Jardiance 25mg daily  Lantus 52 units daily  Metformin 1000mg BID    Additional Instructions  Complete pending lab orders. Pharmacist or PCP will contact regarding results and to discuss resuming Mounjaro

## 2024-11-29 ENCOUNTER — LAB (OUTPATIENT)
Dept: LAB | Facility: LAB | Age: 79
End: 2024-11-29
Payer: COMMERCIAL

## 2024-11-29 DIAGNOSIS — R11.2 NAUSEA AND VOMITING, UNSPECIFIED VOMITING TYPE: ICD-10-CM

## 2024-11-29 DIAGNOSIS — E03.9 ACQUIRED HYPOTHYROIDISM: ICD-10-CM

## 2024-11-29 DIAGNOSIS — E53.8 VITAMIN B12 DEFICIENCY: ICD-10-CM

## 2024-11-29 DIAGNOSIS — Z79.4 TYPE 2 DIABETES MELLITUS WITHOUT COMPLICATION, WITH LONG-TERM CURRENT USE OF INSULIN (MULTI): ICD-10-CM

## 2024-11-29 DIAGNOSIS — E11.9 TYPE 2 DIABETES MELLITUS WITHOUT COMPLICATION, WITH LONG-TERM CURRENT USE OF INSULIN (MULTI): ICD-10-CM

## 2024-11-29 DIAGNOSIS — E03.9 HYPOTHYROIDISM, UNSPECIFIED TYPE: ICD-10-CM

## 2024-11-29 LAB
ALBUMIN SERPL BCP-MCNC: 3.1 G/DL (ref 3.4–5)
ALP SERPL-CCNC: 70 U/L (ref 33–136)
ALT SERPL W P-5'-P-CCNC: 13 U/L (ref 7–45)
ANION GAP SERPL CALC-SCNC: 12 MMOL/L (ref 10–20)
AST SERPL W P-5'-P-CCNC: 13 U/L (ref 9–39)
BILIRUB SERPL-MCNC: 0.4 MG/DL (ref 0–1.2)
BUN SERPL-MCNC: 23 MG/DL (ref 6–23)
CALCIUM SERPL-MCNC: 10.1 MG/DL (ref 8.6–10.3)
CHLORIDE SERPL-SCNC: 109 MMOL/L (ref 98–107)
CO2 SERPL-SCNC: 26 MMOL/L (ref 21–32)
CREAT SERPL-MCNC: 1.32 MG/DL (ref 0.5–1.05)
EGFRCR SERPLBLD CKD-EPI 2021: 41 ML/MIN/1.73M*2
ERYTHROCYTE [DISTWIDTH] IN BLOOD BY AUTOMATED COUNT: 13.5 % (ref 11.5–14.5)
GLUCOSE SERPL-MCNC: 107 MG/DL (ref 74–99)
HCT VFR BLD AUTO: 41.6 % (ref 36–46)
HGB BLD-MCNC: 13.3 G/DL (ref 12–16)
LIPASE SERPL-CCNC: 54 U/L (ref 9–82)
MCH RBC QN AUTO: 30.5 PG (ref 26–34)
MCHC RBC AUTO-ENTMCNC: 32 G/DL (ref 32–36)
MCV RBC AUTO: 95 FL (ref 80–100)
NRBC BLD-RTO: 0 /100 WBCS (ref 0–0)
PLATELET # BLD AUTO: 333 X10*3/UL (ref 150–450)
POTASSIUM SERPL-SCNC: 5.4 MMOL/L (ref 3.5–5.3)
PROT SERPL-MCNC: 6 G/DL (ref 6.4–8.2)
RBC # BLD AUTO: 4.36 X10*6/UL (ref 4–5.2)
SODIUM SERPL-SCNC: 142 MMOL/L (ref 136–145)
T4 FREE SERPL-MCNC: 0.81 NG/DL (ref 0.61–1.12)
TSH SERPL-ACNC: 7.95 MIU/L (ref 0.44–3.98)
VIT B12 SERPL-MCNC: 189 PG/ML (ref 211–911)
WBC # BLD AUTO: 11.5 X10*3/UL (ref 4.4–11.3)

## 2024-11-29 PROCEDURE — 80053 COMPREHEN METABOLIC PANEL: CPT

## 2024-11-29 PROCEDURE — 36415 COLL VENOUS BLD VENIPUNCTURE: CPT

## 2024-11-29 PROCEDURE — 83690 ASSAY OF LIPASE: CPT

## 2024-11-29 PROCEDURE — 82607 VITAMIN B-12: CPT

## 2024-11-29 PROCEDURE — 84443 ASSAY THYROID STIM HORMONE: CPT

## 2024-11-29 PROCEDURE — 85027 COMPLETE CBC AUTOMATED: CPT

## 2024-11-29 PROCEDURE — 84439 ASSAY OF FREE THYROXINE: CPT

## 2024-12-01 DIAGNOSIS — E78.49 OTHER HYPERLIPIDEMIA: ICD-10-CM

## 2024-12-02 DIAGNOSIS — E11.69 TYPE 2 DIABETES MELLITUS WITH OTHER SPECIFIED COMPLICATION, WITH LONG-TERM CURRENT USE OF INSULIN: ICD-10-CM

## 2024-12-02 DIAGNOSIS — E03.9 ACQUIRED HYPOTHYROIDISM: ICD-10-CM

## 2024-12-02 DIAGNOSIS — Z79.4 TYPE 2 DIABETES MELLITUS WITH OTHER SPECIFIED COMPLICATION, WITH LONG-TERM CURRENT USE OF INSULIN: ICD-10-CM

## 2024-12-02 PROCEDURE — RXMED WILLOW AMBULATORY MEDICATION CHARGE

## 2024-12-02 RX ORDER — ATORVASTATIN CALCIUM 10 MG/1
10 TABLET, FILM COATED ORAL DAILY
Qty: 90 TABLET | Refills: 3 | Status: SHIPPED | OUTPATIENT
Start: 2024-12-02

## 2024-12-02 RX ORDER — LEVOTHYROXINE SODIUM 112 UG/1
112 TABLET ORAL DAILY
Qty: 90 TABLET | Refills: 3 | Status: SHIPPED | OUTPATIENT
Start: 2024-12-02 | End: 2025-12-02

## 2024-12-05 ENCOUNTER — PHARMACY VISIT (OUTPATIENT)
Dept: PHARMACY | Facility: CLINIC | Age: 79
End: 2024-12-05
Payer: COMMERCIAL

## 2024-12-09 DIAGNOSIS — E11.69 TYPE 2 DIABETES MELLITUS WITH OTHER SPECIFIED COMPLICATION, WITHOUT LONG-TERM CURRENT USE OF INSULIN: Primary | ICD-10-CM

## 2024-12-09 PROCEDURE — RXMED WILLOW AMBULATORY MEDICATION CHARGE

## 2024-12-09 RX ORDER — TIRZEPATIDE 10 MG/.5ML
10 INJECTION, SOLUTION SUBCUTANEOUS
Qty: 2 ML | Refills: 0 | Status: SHIPPED | OUTPATIENT
Start: 2024-12-09

## 2024-12-09 NOTE — PROGRESS NOTES
Clinical Pharmacist Consult  Luba Cortez is a 79 y.o. female referred to pharmacy for management of No chief complaint on file..  Referring Provider: Sampson Sheth MD    Patient called to discuss Mounjaro dosing. Patient resumed Mounjaro 12.5mg eryn Wednesday 12/4 after holding d/t nausea and vomiting. Labs unremarkable. Patient reports severe nausea on the day following dose. Has improved since. After discussed, will plan to reduce dose to 10mg and follow-up as scheduled to assess.    Medication Changes:  DECREASE  Mounjaro 10mg once weekly  CONTINUE  Jardiance 25mg daily  Lantus 52 units daily  Metformin 1000mg BID      Sola Alcaraz, PharmD  Clinical Pharmacist  [phone]    Continue all meds under the continuation of care with the referring provider and clinical pharmacy team.  Verbal consent to manage patient's drug therapy was obtained from the patient. They were informed they may decline to participate or withdraw from participation in pharmacy services at any time.

## 2024-12-10 ENCOUNTER — PHARMACY VISIT (OUTPATIENT)
Dept: PHARMACY | Facility: CLINIC | Age: 79
End: 2024-12-10
Payer: COMMERCIAL

## 2024-12-11 ENCOUNTER — APPOINTMENT (OUTPATIENT)
Dept: OPHTHALMOLOGY | Facility: CLINIC | Age: 79
End: 2024-12-11
Payer: COMMERCIAL

## 2024-12-11 DIAGNOSIS — E11.3211 MILD NONPROLIFERATIVE DIABETIC RETINOPATHY OF RIGHT EYE WITH MACULAR EDEMA ASSOCIATED WITH TYPE 2 DIABETES MELLITUS: Primary | ICD-10-CM

## 2024-12-11 PROCEDURE — 92134 CPTRZ OPH DX IMG PST SGM RTA: CPT | Performed by: OPHTHALMOLOGY

## 2024-12-11 PROCEDURE — 67028 INJECTION EYE DRUG: CPT | Mod: RIGHT SIDE | Performed by: OPHTHALMOLOGY

## 2024-12-11 ASSESSMENT — VISUAL ACUITY
OD_PH_SC: 20/60
METHOD: SNELLEN - LINEAR
OS_SC: 20/40-2
OD_SC: 20/200

## 2024-12-11 ASSESSMENT — CONF VISUAL FIELD
OS_NORMAL: 1
OD_SUPERIOR_NASAL_RESTRICTION: 0
OS_INFERIOR_TEMPORAL_RESTRICTION: 0
OD_INFERIOR_NASAL_RESTRICTION: 0
OS_SUPERIOR_NASAL_RESTRICTION: 0
OD_INFERIOR_TEMPORAL_RESTRICTION: 0
OD_NORMAL: 1
OS_SUPERIOR_TEMPORAL_RESTRICTION: 0
OS_INFERIOR_NASAL_RESTRICTION: 0
OD_SUPERIOR_TEMPORAL_RESTRICTION: 0

## 2024-12-11 ASSESSMENT — SLIT LAMP EXAM - LIDS
COMMENTS: NORMAL
COMMENTS: NORMAL

## 2024-12-11 ASSESSMENT — CUP TO DISC RATIO
OD_RATIO: 0.6
OS_RATIO: 0.3

## 2024-12-11 ASSESSMENT — EXTERNAL EXAM - LEFT EYE: OS_EXAM: NORMAL

## 2024-12-11 ASSESSMENT — ENCOUNTER SYMPTOMS: EYES NEGATIVE: 1

## 2024-12-11 ASSESSMENT — PACHYMETRY
OS_CT(UM): 551
OD_CT(UM): 562

## 2024-12-11 ASSESSMENT — EXTERNAL EXAM - RIGHT EYE: OD_EXAM: NORMAL

## 2024-12-11 ASSESSMENT — TONOMETRY
OD_IOP_MMHG: 17
OS_IOP_MMHG: 16
IOP_METHOD: GOLDMANN APPLANATION

## 2024-12-11 NOTE — PROGRESS NOTES
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  -appears stable  -Last CT abd pelvis 9/2017: no liver metastasis- small nodule that has not changed.    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 Healthy lifestyle.       4) OHTN OU, glaucoma suspect  -continue FU and gtts per Dr. Lim.     5 today has DME OD       TREAT RIGHT    Treatment options for DME OD discussed, including observation, anti-VEGF injections (including Avastin, Lucentis, Beovu, Vabysmo and Eylea) and laser. Recommend anti-VEGF injections. Eylea done OD today in a sterile manner with Betadine 5% for antisepsis.

## 2024-12-26 ENCOUNTER — APPOINTMENT (OUTPATIENT)
Dept: PHARMACY | Facility: HOSPITAL | Age: 79
End: 2024-12-26
Payer: COMMERCIAL

## 2024-12-26 DIAGNOSIS — E11.69 TYPE 2 DIABETES MELLITUS WITH OTHER SPECIFIED COMPLICATION, WITH LONG-TERM CURRENT USE OF INSULIN: ICD-10-CM

## 2024-12-26 DIAGNOSIS — Z79.4 TYPE 2 DIABETES MELLITUS WITH OTHER SPECIFIED COMPLICATION, WITH LONG-TERM CURRENT USE OF INSULIN: ICD-10-CM

## 2024-12-26 DIAGNOSIS — E11.69 TYPE 2 DIABETES MELLITUS WITH OTHER SPECIFIED COMPLICATION, WITHOUT LONG-TERM CURRENT USE OF INSULIN: ICD-10-CM

## 2024-12-26 RX ORDER — TIRZEPATIDE 10 MG/.5ML
10 INJECTION, SOLUTION SUBCUTANEOUS
Qty: 2 ML | Refills: 2 | Status: SHIPPED | OUTPATIENT
Start: 2024-12-26

## 2024-12-26 NOTE — ASSESSMENT & PLAN NOTE
Patient's A1c is 6.5% on 11/26/24. Goal A1c <7%.  Patient's SMBGs are at goal, Jasper 14-day average  with TIR 90%.     Rationale for plan: Patient resumed take Mounjaro 10mg and has completed 2 doses. Experiencing some mild GI upset initially, but has resolved. Due to tolerating restart at Mounjaro and BG well-controlled, plan to continue current regimen and follow-up in 4 weeks.    Medication Changes:  CONTINUE  Jardiance 25mg daily  Lantus 52 units daily  Metformin 1000mg BID  Mounjaro 10mg once weekly

## 2024-12-26 NOTE — PROGRESS NOTES
Clinical Pharmacy Appointment    Patient ID: Luba Cortez is a 79 y.o. female who presents for Diabetes.    Pt is here for Follow Up appointment.   Referring Provider: Sampson Sheth, *  PCP: Sampson Sheth MD, last visit: 11/26/24, next visit: 2/26/25    Subjective   HPI  PMH significant for T2DM, gastroparesis, HTN, HLD, hypothyroid, GERD, glaucoma, retinopathy.  Special needs/barriers to therapy: None identified     Patient Assistance for Mounjaro, Humalog, and Lantus approved through 5/3/25. Will have to be renewed prior to that date to prevent lapse in coverage. Medication(s) will be received at no cost to patient from [facility].     Medication System Management  Patients preferred pharmacy: [facility]   Adherence/Organization: No current concerns  Affordability/Accessibility: No current concerns    Drug Interactions  No relevant drug interactions were noted.    DIABETES MELLITUS Type 2:    Follows with Endocrinology?: No    Pertinent PMH Review  Known diabetic complications:   Retinopathy  Autonomic neuropathy  Hx or FH Hx of MTC/MEN2?: No  Pancreatitis?: No  Gastroparesis?: No  UTI/Yeast Infections?: No  Retinopathy?: Yes    Current diabetic medications include:  Jardiance 25mg daily  Lantus 52 units daily  Metformin 1000mg BID  Mounjaro 10mg once weekly  Previous medications: Rybelsus (ineffective), Mounjaro 12.5mg (N/V)    Clarifications to above regimen: Resumed Mounjaro 1 week ago  Adverse Effects: Some stomach upset for a couple days after restarting Mounjaro, currently none     Glucose Readings  Glucometer/CGM Type: Freestyle Jasper 3     Previous home BG readings: (10/28/24) 7-day Avg , TIR 71%   Current home BG readings:   Average Glucose   7-day 14-day 30-day   Average 138 138 138     Time in Target   7-day 14-day 30-day   Above (>180) 9% 10% 25%   In [employer] () 91% 90% 75%   Below (<70) 0% 0% 0%     Any episodes of hypoglycemia? No   Did patient treat  "episode of hypoglycemia appropriately? N/A  Does the patient have a prescription for ready-to-use Glucagon? N/A    Lifestyle  Diet: Able to eat regular meals without issue since resuming Mounjaro  Physical Activity: No recent changes    Risk Reducing Medications  Statin? Yes  ACE-I/ARB? Yes    Preventative Care  Diabetic Eye Exam: Following with ophthalmology  Monofilament Foot Exam: Needs completed, last unknown  Immunizations Needed: All up-to-date and documented  Tobacco Use: former smoker, quit 2011     Objective   Allergies   Allergen Reactions    Adhesive Tape-Silicones Unknown    Codeine Itching, Nausea Only, Other and Unknown     n/v    Iodinated Contrast Media Unknown    Iodine Unknown    Liraglutide Diarrhea, Nausea Only, Headache and Unknown     victoza     Social History     Social History Narrative    Not on file      Medication Review  Current Outpatient Medications   Medication Instructions    albuterol (Proventil HFA) 90 mcg/actuation inhaler 2 puffs, inhalation, Every 4 hours PRN    alcohol swabs pads, medicated Use as directed to check blood glucose once daily.    amLODIPine (NORVASC) 2.5 mg, oral, Daily    aspirin 81 mg EC tablet 1 tablet, oral, Daily    atorvastatin (LIPITOR) 10 mg, oral, Daily    BD Luer-Ella Syringe 3 mL 25 gauge x 1\" syringe use weekly with B-12 INJECTIONS as directed    blood sugar diagnostic (OneTouch Ultra Test) strip Use as directed to check blood glucose once daily.    blood-glucose meter (OneTouch Ultra2 Meter) misc Use as directed to monitor blood glucose daily.    calcium carbonate-vitamin D3 500 mg-5 mcg (200 unit) tablet 1 tablet, oral, Daily    cholecalciferol (VITAMIN D-3) 50,000 Units, oral, Once Weekly    Debrox 6.5 % otic solution otic (ear)    dorzolamide-timolol, PF, (Cosopt, PF,) 2-0.5 % ophthalmic solution 1 drop, Both Eyes, 2 times daily    Droplet Pen Needle 32 gauge x 5/32\" needle use 1 PEN NEEDLE to inject MEDICATION subcutaneously six times a day    " "escitalopram (LEXAPRO) 10 mg, oral, Nightly    fenofibrate (TRICOR) 145 mg, oral, Daily    Jardiance 25 mg, oral, Daily    lancets (OneTouch Delica Plus Lancet) 33 gauge misc Use as directed to check blood glucose once daily.    Lantus Solostar U-100 Insulin 45 Units, subcutaneous, 2 times daily    levothyroxine (SYNTHROID, LEVOXYL) 112 mcg, oral, Daily, Take on an empty stomach at the same time each day, either 30 to 60 minutes prior to breakfast    metFORMIN (GLUCOPHAGE) 1,000 mg, oral, 2 times daily (morning and late afternoon)    metoprolol tartrate (LOPRESSOR) 50 mg, oral, 2 times daily    Mounjaro 10 mg, subcutaneous, Every 7 days    tafluprost, PF, 0.0015 % dropperette 1 drop, ophthalmic (eye), Nightly    tamoxifen (NOLVADEX) 20 mg, oral, Daily    valsartan (DIOVAN) 320 mg, oral, Daily      Vitals  BP Readings from Last 2 Encounters:   11/26/24 126/74   06/25/24 129/72     Labs  A1C  Lab Results   Component Value Date    HGBA1C 6.5 11/26/2024    HGBA1C 6.1 06/04/2024    HGBA1C 8.4 (A) 09/06/2023     BMP  Lab Results   Component Value Date    CALCIUM 10.1 11/29/2024     11/29/2024    K 5.4 (H) 11/29/2024    CO2 26 11/29/2024     (H) 11/29/2024    BUN 23 11/29/2024    CREATININE 1.32 (H) 11/29/2024    EGFR 41 (L) 11/29/2024     LFTs  Lab Results   Component Value Date    ALT 13 11/29/2024    AST 13 11/29/2024    ALKPHOS 70 11/29/2024    BILITOT 0.4 11/29/2024     FLP  Lab Results   Component Value Date    TRIG 163 (H) 09/12/2023    CHOL 104 09/12/2023    LDLF 43 09/12/2023    HDL 28.0 (A) 09/12/2023     Urine Microalbumin  No results found for: \"MICROALBCREA\"  Weight Management  Wt Readings from Last 3 Encounters:   11/26/24 81.2 kg (179 lb)   06/28/24 87.5 kg (193 lb)   06/25/24 87.5 kg (193 lb)      BMI Readings from Last 1 Encounters:   11/26/24 28.04 kg/m²       Assessment/Plan   Problem List Items Addressed This Visit       Diabetes mellitus, type 2 (Multi)     Patient's A1c is 6.5% on " 11/26/24. Goal A1c <7%.  Patient's SMBGs are at goal, Jasper 14-day average  with TIR 90%.     Rationale for plan: Patient resumed take Mounjaro 10mg and has completed 2 doses. Experiencing some mild GI upset initially, but has resolved. Due to tolerating restart at Mounjaro and BG well-controlled, plan to continue current regimen and follow-up in 4 weeks.    Medication Changes:  CONTINUE  Jardiance 25mg daily  Lantus 52 units daily  Metformin 1000mg BID  Mounjaro 10mg once weekly          Relevant Medications    tirzepatide (Mounjaro) 10 mg/0.5 mL pen injector    Other Relevant Orders    Referral to Clinical Pharmacy     Patient Education:  Counseled patient on relevant medication mechanisms of action, expectations, side effects, duration of therapy, contraindications, administration, and monitoring parameters.  All questions and concerns addressed. Contact pharmacist with any further questions or concerns prior to next appointment.    Clinical Pharmacist follow-up: 1/23/25 at 10:20am, Telehealth visit    Thank you,  Sola Alcaraz, PharmD  Clinical Pharmacy Specialist  [phone]    Continue all meds under the continuation of care with the referring provider and clinical pharmacy team.  Verbal consent to manage patient's drug therapy was obtained from the patient. They were informed they may decline to participate or withdraw from participation in pharmacy services at any time.

## 2024-12-30 PROCEDURE — RXMED WILLOW AMBULATORY MEDICATION CHARGE

## 2025-01-06 ENCOUNTER — APPOINTMENT (OUTPATIENT)
Dept: OPHTHALMOLOGY | Facility: CLINIC | Age: 80
End: 2025-01-06
Payer: COMMERCIAL

## 2025-01-06 ENCOUNTER — PHARMACY VISIT (OUTPATIENT)
Dept: PHARMACY | Facility: CLINIC | Age: 80
End: 2025-01-06
Payer: COMMERCIAL

## 2025-01-13 ENCOUNTER — HOSPITAL ENCOUNTER (OUTPATIENT)
Dept: RADIOLOGY | Facility: HOSPITAL | Age: 80
Discharge: HOME | End: 2025-01-13
Payer: COMMERCIAL

## 2025-01-13 VITALS — HEIGHT: 67 IN | WEIGHT: 172 LBS | BODY MASS INDEX: 27 KG/M2

## 2025-01-13 DIAGNOSIS — Z12.31 ENCOUNTER FOR SCREENING MAMMOGRAM FOR BREAST CANCER: ICD-10-CM

## 2025-01-13 PROCEDURE — 77067 SCR MAMMO BI INCL CAD: CPT

## 2025-01-13 PROCEDURE — 77063 BREAST TOMOSYNTHESIS BI: CPT | Performed by: STUDENT IN AN ORGANIZED HEALTH CARE EDUCATION/TRAINING PROGRAM

## 2025-01-13 PROCEDURE — 77067 SCR MAMMO BI INCL CAD: CPT | Performed by: STUDENT IN AN ORGANIZED HEALTH CARE EDUCATION/TRAINING PROGRAM

## 2025-01-15 PROCEDURE — RXMED WILLOW AMBULATORY MEDICATION CHARGE

## 2025-01-20 ENCOUNTER — PHARMACY VISIT (OUTPATIENT)
Dept: PHARMACY | Facility: CLINIC | Age: 80
End: 2025-01-20
Payer: COMMERCIAL

## 2025-01-23 ENCOUNTER — APPOINTMENT (OUTPATIENT)
Dept: PHARMACY | Facility: HOSPITAL | Age: 80
End: 2025-01-23
Payer: COMMERCIAL

## 2025-01-23 DIAGNOSIS — Z79.4 TYPE 2 DIABETES MELLITUS WITH OTHER SPECIFIED COMPLICATION, WITH LONG-TERM CURRENT USE OF INSULIN: ICD-10-CM

## 2025-01-23 DIAGNOSIS — E11.69 TYPE 2 DIABETES MELLITUS WITH OTHER SPECIFIED COMPLICATION, WITH LONG-TERM CURRENT USE OF INSULIN: ICD-10-CM

## 2025-01-23 DIAGNOSIS — E11.9 TYPE 2 DIABETES MELLITUS WITHOUT COMPLICATION, WITHOUT LONG-TERM CURRENT USE OF INSULIN (MULTI): ICD-10-CM

## 2025-01-23 RX ORDER — METFORMIN HYDROCHLORIDE 500 MG/1
500 TABLET ORAL 2 TIMES DAILY
Qty: 180 TABLET | Refills: 1 | Status: CANCELLED | OUTPATIENT
Start: 2025-01-23 | End: 2026-01-23

## 2025-01-23 NOTE — PROGRESS NOTES
Clinical Pharmacy Appointment    Patient ID: Luba Cortez is a 79 y.o. female who presents for Diabetes.    Pt is here for Follow Up appointment.   Referring Provider: Sampson Sheth, *  PCP: Sampson Sheth MD, last visit: 11/26/24, next visit: 2/26/25    Subjective   HPI  PMH significant for T2DM, gastroparesis, HTN, HLD, hypothyroid, GERD, glaucoma, retinopathy.  Special needs/barriers to therapy: None identified     Patient Assistance for Mounjaro and Lantus approved through 5/3/25. Will have to be renewed prior to that date to prevent lapse in coverage. Medication(s) will be received at no cost to patient from [facility].     Medication System Management  Patients preferred pharmacy: [facility]   Adherence/Organization: No current concerns  Affordability/Accessibility: No current concerns    Drug Interactions  No relevant drug interactions were noted.    DIABETES MELLITUS Type 2:    Follows with Endocrinology?: No    Pertinent PMH Review  Known diabetic complications:   Retinopathy  Autonomic neuropathy  Hx or FH Hx of MTC/MEN2?: No  Pancreatitis?: No  Gastroparesis?: No  UTI/Yeast Infections?: No  Retinopathy?: Yes    Current diabetic medications include:  Jardiance 25mg daily  Lantus 52 units daily  Metformin 1000mg BID  Mounjaro 10mg once weekly  Previous medications: Rybelsus (ineffective), Mounjaro 12.5mg (N/V)    Clarifications to above regimen: None  Adverse Effects: None    Glucose Readings  Glucometer/CGM Type: Freestyle Jasper 3     Previous home BG readings: (12/26/24) 7-day Avg , TIR 71%   Current home BG readings:   Average Glucose   7-day 14-day 30-day   Average 175 157 152     Time in Target   7-day 14-day 30-day   Above (>180) 42% 28% 23%   In Target () 58% 71% 76%   Below (<70) 0% 1% 1%     Any episodes of hypoglycemia? Yes - recalls one occurrence right before dinner. Symptoms included dizziness.  Did patient treat episode of hypoglycemia  "appropriately? Yes, had a meal and monitored  Does the patient have a prescription for ready-to-use Glucagon? No    Lifestyle  Diet: Has not been eating well recently due to a death in the family  Physical Activity: No recent changes    Risk Reducing Medications  Statin? Yes  ACE-I/ARB? Yes    Preventative Care  Diabetic Eye Exam: Following with ophthalmology  Monofilament Foot Exam: Needs completed, last unknown  Immunizations Needed: All up-to-date and documented  Tobacco Use: former smoker, quit 2011     Objective   Allergies   Allergen Reactions    Adhesive Tape-Silicones Unknown    Codeine Itching, Nausea Only, Other and Unknown     n/v    Iodinated Contrast Media Unknown    Iodine Unknown    Liraglutide Diarrhea, Nausea Only, Headache and Unknown     victoza     Social History     Social History Narrative    Not on file      Medication Review  Current Outpatient Medications   Medication Instructions    albuterol (Proventil HFA) 90 mcg/actuation inhaler 2 puffs, inhalation, Every 4 hours PRN    alcohol swabs pads, medicated Use as directed to check blood glucose once daily.    amLODIPine (NORVASC) 2.5 mg, oral, Daily    aspirin 81 mg EC tablet 1 tablet, oral, Daily    atorvastatin (LIPITOR) 10 mg, oral, Daily    BD Luer-Ella Syringe 3 mL 25 gauge x 1\" syringe use weekly with B-12 INJECTIONS as directed    blood sugar diagnostic (OneTouch Ultra Test) strip Use as directed to check blood glucose once daily.    blood-glucose meter (OneTouch Ultra2 Meter) misc Use as directed to monitor blood glucose daily.    calcium carbonate-vitamin D3 500 mg-5 mcg (200 unit) tablet 1 tablet, oral, Daily    cholecalciferol (VITAMIN D-3) 50,000 Units, oral, Once Weekly    Debrox 6.5 % otic solution otic (ear)    dorzolamide-timolol, PF, (Cosopt, PF,) 2-0.5 % ophthalmic solution 1 drop, Both Eyes, 2 times daily    Droplet Pen Needle 32 gauge x 5/32\" needle use 1 PEN NEEDLE to inject MEDICATION subcutaneously six times a day    " "escitalopram (LEXAPRO) 10 mg, oral, Nightly    fenofibrate (TRICOR) 145 mg, oral, Daily    Jardiance 25 mg, oral, Daily    lancets (OneTouch Delica Plus Lancet) 33 gauge misc Use as directed to check blood glucose once daily.    Lantus Solostar U-100 Insulin 45 Units, subcutaneous, 2 times daily    levothyroxine (SYNTHROID, LEVOXYL) 112 mcg, oral, Daily, Take on an empty stomach at the same time each day, either 30 to 60 minutes prior to breakfast    metoprolol tartrate (LOPRESSOR) 50 mg, oral, 2 times daily    Mounjaro 10 mg, subcutaneous, Every 7 days    tafluprost, PF, 0.0015 % dropperette 1 drop, ophthalmic (eye), Nightly    tamoxifen (NOLVADEX) 20 mg, oral, Daily    valsartan (DIOVAN) 320 mg, oral, Daily      Vitals  BP Readings from Last 2 Encounters:   11/26/24 126/74   06/25/24 129/72     Labs  A1C  Lab Results   Component Value Date    HGBA1C 6.5 11/26/2024    HGBA1C 6.1 06/04/2024    HGBA1C 8.4 (A) 09/06/2023     BMP  Lab Results   Component Value Date    CALCIUM 10.1 11/29/2024     11/29/2024    K 5.4 (H) 11/29/2024    CO2 26 11/29/2024     (H) 11/29/2024    BUN 23 11/29/2024    CREATININE 1.32 (H) 11/29/2024    EGFR 41 (L) 11/29/2024     LFTs  Lab Results   Component Value Date    ALT 13 11/29/2024    AST 13 11/29/2024    ALKPHOS 70 11/29/2024    BILITOT 0.4 11/29/2024     FLP  Lab Results   Component Value Date    TRIG 163 (H) 09/12/2023    CHOL 104 09/12/2023    LDLF 43 09/12/2023    HDL 28.0 (A) 09/12/2023     Urine Microalbumin  No results found for: \"MICROALBCREA\"  Weight Management  Wt Readings from Last 3 Encounters:   01/13/25 78 kg (172 lb)   11/26/24 81.2 kg (179 lb)   06/28/24 87.5 kg (193 lb)      BMI Readings from Last 1 Encounters:   01/13/25 26.94 kg/m²       Assessment/Plan   Problem List Items Addressed This Visit       Diabetes mellitus, type 2 (Multi)     Patient's A1c is 6.5% on 11/26/24. Goal A1c <7%.  Patient's SMBGs are Patient's SMBGs are at goal, Jasper 30-day average "  with TIR 76%.       Rationale for plan: Patient tolerating current regimen well, no adverse effects. BG notably higher in past week and patient reports she has not been eating well due to a recent death in the family. Otherwise, BG well-controlled. One occurrence of hypoglycemia, appropriately treated and quickly resolved. Monitor kidney function at PCP follow-up, if eGFR <45 will reduce dose of metformin. Continue current regimen at this time and follow-up in 4-6 weeks or sooner as needed.    Medication Changes:  CONTINUE  Jardiance 25mg daily  Lantus 52 units daily  Metformin 1000mg BID  Mounjaro 10mg once weekly    Future Considerations  Re-check kidney function at upcoming PCP appt. If eGFR<45 will reduce dose of metformin to total 1,000mg daily.         Relevant Orders    Referral to Clinical Pharmacy    Albumin-Creatinine Ratio, Urine Random    Lipid panel    Comprehensive metabolic panel     Patient Education:  Counseled patient on relevant medication mechanisms of action, expectations, side effects, duration of therapy, contraindications, administration, and monitoring parameters.  All questions and concerns addressed. Contact pharmacist with any further questions or concerns prior to next appointment.    Clinical Pharmacist follow-up: 3/3/25 at 10:20am, Telehealth visit    Thank you,  Sola Alcaraz, PharmD  Clinical Pharmacy Specialist  [phone]    Continue all meds under the continuation of care with the referring provider and clinical pharmacy team.  Verbal consent to manage patient's drug therapy was obtained from the patient. They were informed they may decline to participate or withdraw from participation in pharmacy services at any time.

## 2025-01-23 NOTE — ASSESSMENT & PLAN NOTE
Patient's A1c is 6.5% on 11/26/24. Goal A1c <7%.  Patient's SMBGs are Patient's SMBGs are at goal, Jasper 30-day average  with TIR 76%.       Rationale for plan: Patient tolerating current regimen well, no adverse effects. BG notably higher in past week and patient reports she has not been eating well due to a recent death in the family. Otherwise, BG well-controlled. One occurrence of hypoglycemia, appropriately treated and quickly resolved. Monitor kidney function at PCP follow-up, if eGFR <45 will reduce dose of metformin. Continue current regimen at this time and follow-up in 4-6 weeks or sooner as needed.    Medication Changes:  CONTINUE  Jardiance 25mg daily  Lantus 52 units daily  Metformin 1000mg BID  Mounjaro 10mg once weekly    Future Considerations  Re-check kidney function at upcoming PCP appt. If eGFR<45 will reduce dose of metformin to total 1,000mg daily.

## 2025-01-31 PROCEDURE — RXMED WILLOW AMBULATORY MEDICATION CHARGE

## 2025-02-03 ENCOUNTER — PHARMACY VISIT (OUTPATIENT)
Dept: PHARMACY | Facility: CLINIC | Age: 80
End: 2025-02-03
Payer: COMMERCIAL

## 2025-02-05 PROCEDURE — RXMED WILLOW AMBULATORY MEDICATION CHARGE

## 2025-02-07 ENCOUNTER — PHARMACY VISIT (OUTPATIENT)
Dept: PHARMACY | Facility: CLINIC | Age: 80
End: 2025-02-07
Payer: COMMERCIAL

## 2025-02-25 ENCOUNTER — TELEPHONE (OUTPATIENT)
Dept: PHARMACY | Facility: HOSPITAL | Age: 80
End: 2025-02-25
Payer: COMMERCIAL

## 2025-02-26 ENCOUNTER — APPOINTMENT (OUTPATIENT)
Dept: PRIMARY CARE | Facility: CLINIC | Age: 80
End: 2025-02-26
Payer: COMMERCIAL

## 2025-02-26 ENCOUNTER — TELEPHONE (OUTPATIENT)
Dept: PHARMACY | Facility: HOSPITAL | Age: 80
End: 2025-02-26

## 2025-02-26 VITALS
OXYGEN SATURATION: 97 % | HEART RATE: 74 BPM | DIASTOLIC BLOOD PRESSURE: 62 MMHG | BODY MASS INDEX: 27.15 KG/M2 | SYSTOLIC BLOOD PRESSURE: 118 MMHG | HEIGHT: 67 IN | WEIGHT: 173 LBS | RESPIRATION RATE: 16 BRPM

## 2025-02-26 DIAGNOSIS — E11.69 TYPE 2 DIABETES MELLITUS WITH OTHER SPECIFIED COMPLICATION, WITH LONG-TERM CURRENT USE OF INSULIN: ICD-10-CM

## 2025-02-26 DIAGNOSIS — C69.31: ICD-10-CM

## 2025-02-26 DIAGNOSIS — N18.31 STAGE 3A CHRONIC KIDNEY DISEASE (MULTI): ICD-10-CM

## 2025-02-26 DIAGNOSIS — E11.9 TYPE 2 DIABETES MELLITUS WITHOUT COMPLICATION, WITHOUT LONG-TERM CURRENT USE OF INSULIN (MULTI): Primary | ICD-10-CM

## 2025-02-26 DIAGNOSIS — Z79.4 TYPE 2 DIABETES MELLITUS WITH OTHER SPECIFIED COMPLICATION, WITH LONG-TERM CURRENT USE OF INSULIN: ICD-10-CM

## 2025-02-26 DIAGNOSIS — E53.8 VITAMIN B12 DEFICIENCY: ICD-10-CM

## 2025-02-26 PROBLEM — E11.3311: Status: RESOLVED | Noted: 2023-10-16 | Resolved: 2025-02-26

## 2025-02-26 PROBLEM — H43.11 VITREOUS HEMORRHAGE OF RIGHT EYE (MULTI): Status: RESOLVED | Noted: 2022-06-28 | Resolved: 2025-02-26

## 2025-02-26 LAB — POC HEMOGLOBIN A1C: 5.9 % (ref 4.2–6.5)

## 2025-02-26 PROCEDURE — 83036 HEMOGLOBIN GLYCOSYLATED A1C: CPT | Performed by: INTERNAL MEDICINE

## 2025-02-26 PROCEDURE — 1123F ACP DISCUSS/DSCN MKR DOCD: CPT | Performed by: INTERNAL MEDICINE

## 2025-02-26 PROCEDURE — 99213 OFFICE O/P EST LOW 20 MIN: CPT | Performed by: INTERNAL MEDICINE

## 2025-02-26 PROCEDURE — 3078F DIAST BP <80 MM HG: CPT | Performed by: INTERNAL MEDICINE

## 2025-02-26 PROCEDURE — 3074F SYST BP LT 130 MM HG: CPT | Performed by: INTERNAL MEDICINE

## 2025-02-26 PROCEDURE — 1159F MED LIST DOCD IN RCRD: CPT | Performed by: INTERNAL MEDICINE

## 2025-02-26 PROCEDURE — 1036F TOBACCO NON-USER: CPT | Performed by: INTERNAL MEDICINE

## 2025-02-26 PROCEDURE — 1126F AMNT PAIN NOTED NONE PRSNT: CPT | Performed by: INTERNAL MEDICINE

## 2025-02-26 RX ORDER — CYANOCOBALAMIN 1000 UG/ML
1000 INJECTION, SOLUTION INTRAMUSCULAR; SUBCUTANEOUS
Qty: 1 ML | Refills: 11 | Status: SHIPPED | OUTPATIENT
Start: 2025-02-26

## 2025-02-26 RX ORDER — INSULIN GLARGINE 100 [IU]/ML
40 INJECTION, SOLUTION SUBCUTANEOUS NIGHTLY
Start: 2025-02-26 | End: 2026-02-26

## 2025-02-26 ASSESSMENT — ENCOUNTER SYMPTOMS: DIABETIC ASSOCIATED SYMPTOMS: 0

## 2025-02-26 ASSESSMENT — PATIENT HEALTH QUESTIONNAIRE - PHQ9
1. LITTLE INTEREST OR PLEASURE IN DOING THINGS: NOT AT ALL
SUM OF ALL RESPONSES TO PHQ9 QUESTIONS 1 AND 2: 0
2. FEELING DOWN, DEPRESSED OR HOPELESS: NOT AT ALL

## 2025-02-26 ASSESSMENT — PAIN SCALES - GENERAL: PAINLEVEL_OUTOF10: 0-NO PAIN

## 2025-02-26 NOTE — PATIENT INSTRUCTIONS
A1C TODAY 5.9% = LOW, TOO LOW.    2.  RECOMMEND DECREASE LANTUS TO 40 UNITS ONCE DAILY IN THE EVENING    3.  BP IS LOW SO RECOMMEND D/C THE LOW DOSE AMLODIPINE FOR NOW, CONTINUE ALL OTHER MEDS THE SAME    4.  YOU ARE NOT ANEMIC, BUT WE ASK YOU TO KEEP TAKING B12 INJECTIONS SO ANEMIA DOES NOT RETURN    5.  FOLLOW UP 4-6 MONTHS OR AS NEEDED.  WE WOULD LIKE YOUR WEIGHT TO BE ALLOWED TO PLATEAU AT THIS TIME, AND SO IT MIGHT BE APPROPRIATE TO BEGIN REDUCING MOUNJARO SO THAT FURTHER WEIGHT LOSS IS NOT CAUSED.

## 2025-02-26 NOTE — TELEPHONE ENCOUNTER
Clinical Pharmacist Consult  Luba Cortez is a 79 y.o. female referred to pharmacy for management of Diabetes.  Referring Provider: Sampson Sheth MD    Patient contacted this pharmacist following an earlier appt with PCP today to discuss A1c results and medication changes.     Patient's A1c reduced to 5.9% today from 6.5% three months ago. Corresponds to average glucose of 123, within healthy goal range. Reviewed patients recent's Libreview report and confirmed readings fairly consistent with A1c and indicates no recent hypoglycemia.    Patient did previously experience some occasional evening and overnight hypoglycemia prior to decreasing Lantus and Mounjaro doses. Since resolved, no hypoglycemia recorded on CGM or noted by patient within the past 30 days.      Average Glucose   7-day 14-day 30-day   Average 151 148 146     Time in Target   7-day 14-day 30-day   Above (>180) 17% 19% 17%   In Target () 83% 81% 83%   Below (<70) 0% 0% 0%     Also discussed weight loss on Mounjaro. Patient is currently at a healthy BMI and personally satisfied with her weight. Goal of maintaining weight at this time. Patient has lost 28 lbs (14% of TBW) since starting Mounjaro, including 6 lbs. over the past 3 months. Rate of weight loss has slowed since reducing Mounjaro from 15mg to 10mg currently. Continue to monitor weight and consider reducing Mounjaro if weight continues to decrease.    Weight History  Prior to Mounjaro: 201 lbs. (12/4/23)  After 6 months: 194 lbs. (6/4/24)  After 1 year: 179 lbs. (11/26/24)  Current weight: 173 lbs. (2/26/25)    Weight Goals  BMI Goal: 23 to 29.9 reasonable due to age  Weight Goal: 147 to 191 lbs.  Patient defined goal(s): Satisfied with current weight      After reviewing the patient's recent A1c, glucose readings, and weight history, recommend continuing current diabetes regimen at this time and following-up prior to next Mounjaro refill (2 weeks) to discuss dose decrease  vs. continuation. Assessment and recommendation provided to PCP for review and will defer to PCP for final instruction.    Medication Changes:  Recommend continuation of current diabetes medications  Jardiance 25mg daily  Lantus 52 units daily  Metformin 1000mg BID  Mounjaro 10mg once weekly    Future Considerations  Monitor weight changes and reduce dose of Mounjaro if weight continues to decrease.    Patient Education:  Counseled patient on relevant medication mechanisms of action, expectations, side effects, duration of therapy, contraindications, administration, and monitoring parameters.  All questions and concerns addressed. Contact pharmacist with any further questions or concerns prior to next appointment.    Thank You,  Sola Alcaraz, [facility]  Clinical Pharmacy Specialist  [phone]    Continue all meds under the continuation of care with the referring provider and clinical pharmacy team.  Verbal consent to manage patient's drug therapy was obtained from the patient. They were informed they may decline to participate or withdraw from participation in pharmacy services at any time.

## 2025-02-26 NOTE — PROGRESS NOTES
"Subjective   Patient ID: Luba Cortez is a 79 y.o. female who presents for Follow-up and Diabetes.  Last A1C was 6.5% on 11.26.2024  Today's A1C was 5.9%  PATIENT CHECKS GLUCOSE AT HOME, USING A CARIN 3- READINGS AFTER DINNER ARE AROUND 170, LUNCH TIME IS 130S AND BEDTIME AROUND 100 AND WILL OCCASIONALLY HAVE TO GET A SNACK       Diabetes  She presents for her follow-up diabetic visit. She has type 2 diabetes mellitus. No MedicAlert identification noted. Her disease course has been stable. There are no hypoglycemic associated symptoms. There are no diabetic associated symptoms. There are no hypoglycemic complications. Symptoms are stable. There are no diabetic complications. Risk factors for coronary artery disease include diabetes mellitus. Current diabetic treatment includes oral agent (monotherapy) and insulin injections. She is compliant with treatment all of the time. When asked about meal planning, she reported none. She has not had a previous visit with a dietitian. She never participates in exercise. She does not see a podiatrist.Eye exam is current.      HAVE CONTINUED TO LOSE WEIGHT    GETTING A LIGHT HEADED WHEN STANDING UP OCCASIONALLY  Review of Systems   All other systems reviewed and are negative.      Objective   /62   Pulse 74   Resp 16   Ht 1.702 m (5' 7\")   Wt 78.5 kg (173 lb)   SpO2 97%   BMI 27.10 kg/m²     Physical Exam  Vitals reviewed.   Constitutional:       General: She is not in acute distress.     Appearance: She is not ill-appearing.   Eyes:      Comments: RIGHT LATERAL SCLERA S/P SCLERAL TRANSPLANT   Cardiovascular:      Rate and Rhythm: Normal rate and regular rhythm.      Pulses: Normal pulses.      Heart sounds:      No gallop.   Pulmonary:      Breath sounds: Normal breath sounds. No wheezing, rhonchi or rales.   Abdominal:      General: Abdomen is flat. Bowel sounds are normal.      Palpations: Abdomen is soft.      Tenderness: There is no guarding or rebound. "   Musculoskeletal:      Right lower leg: No edema.      Left lower leg: No edema.   Skin:     Coloration: Skin is not jaundiced.         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    2.  RECOMMEND DECREASE LANTUS TO 40 UNITS ONCE DAILY IN THE EVENING    3.  BP IS LOW SO RECOMMEND D/C THE LOW DOSE AMLODIPINE FOR NOW, CONTINUE ALL OTHER MEDS THE SAME    4.  YOU ARE NOT ANEMIC, BUT WE ASK YOU TO KEEP TAKING B12 INJECTIONS SO ANEMIA DOES NOT RETURN    5.  FOLLOW UP 4-6 MONTHS OR AS NEEDED.  WE WOULD LIKE YOUR WEIGHT TO BE ALLOWED TO PLATEAU AT THIS TIME, AND SO IT MIGHT BE APPROPRIATE TO BEGIN REDUCING MOUNJARO SO THAT FURTHER WEIGHT LOSS IS NOT CAUSED.

## 2025-02-28 DIAGNOSIS — E11.69 TYPE 2 DIABETES MELLITUS WITH OTHER SPECIFIED COMPLICATION, WITH LONG-TERM CURRENT USE OF INSULIN: ICD-10-CM

## 2025-02-28 DIAGNOSIS — Z79.4 TYPE 2 DIABETES MELLITUS WITH OTHER SPECIFIED COMPLICATION, WITH LONG-TERM CURRENT USE OF INSULIN: ICD-10-CM

## 2025-02-28 DIAGNOSIS — F41.9 ANXIETY: ICD-10-CM

## 2025-02-28 PROCEDURE — RXMED WILLOW AMBULATORY MEDICATION CHARGE

## 2025-02-28 RX ORDER — ESCITALOPRAM OXALATE 10 MG/1
10 TABLET ORAL NIGHTLY
Qty: 90 TABLET | Refills: 3 | Status: SHIPPED | OUTPATIENT
Start: 2025-02-28

## 2025-03-03 ENCOUNTER — APPOINTMENT (OUTPATIENT)
Dept: PHARMACY | Facility: HOSPITAL | Age: 80
End: 2025-03-03
Payer: COMMERCIAL

## 2025-03-04 ENCOUNTER — PHARMACY VISIT (OUTPATIENT)
Dept: PHARMACY | Facility: CLINIC | Age: 80
End: 2025-03-04
Payer: COMMERCIAL

## 2025-03-17 ENCOUNTER — APPOINTMENT (OUTPATIENT)
Dept: OPHTHALMOLOGY | Facility: CLINIC | Age: 80
End: 2025-03-17
Payer: COMMERCIAL

## 2025-03-24 ENCOUNTER — TELEPHONE (OUTPATIENT)
Dept: PHARMACY | Facility: HOSPITAL | Age: 80
End: 2025-03-24
Payer: COMMERCIAL

## 2025-03-24 DIAGNOSIS — Z86.39 HISTORY OF DIABETES MELLITUS: Primary | ICD-10-CM

## 2025-03-24 NOTE — TELEPHONE ENCOUNTER
Clinical Pharmacist Consult  Luba Cortez is a 79 y.o. female referred to pharmacy for management of Diabetes.  Referring Provider: Sampson Sheth MD    Patient contacted pharmacist to discuss recent BG readings and weight monitoring.    Patient reports BG remains well-controlled. Reviewed Jasper data:  Average Glucose   7-day 14-day 30-day   Average 148 149 150     Time in Target   7-day 14-day 30-day   Above (>180) 13% 15% 17%   In Target () 87% 83% 83%   Below (<70) 0% 0% 0%     Reviewed weight changes and patient continues to lose weight on current dose of Mounjaro.  Patient is within healthy BMI range and satisfied with current weight, goal of maintaining. Patient also notes that she has been able to walk longer distances (entire parking lot, half a block) without shortness of breath.    Weight History  Prior to Mounjaro: 201 lbs. (12/4/23)  After 6 months: 194 lbs. (6/4/24)  After 1 year: 179 lbs. (11/26/24)  Previous weight: 173 lbs. (2/26/25)  Current weight: 169 lbs.    Weight Goals  BMI Goal: 23 to 29.9 reasonable due to age  Weight Goal: 147 to 191 lbs.  Patient defined goal(s): Satisfied with current weight    Due to weight continuing to decrease and BG well-controlled, will reduce dose of Mounjaro to 7.5mg and follow-up in 4 weeks.    Medication Changes:  DECREASE  Mounjaro 7.5mg once weekly   CONTINUE  Jardiance 25mg daily  Lantus 52 units daily  Metformin 1000mg BID    Patient Education:  Counseled patient on relevant medication mechanisms of action, expectations, side effects, duration of therapy, contraindications, administration, and monitoring parameters.  All questions and concerns addressed. Contact pharmacist with any further questions or concerns prior to next appointment.    Clinical Pharmacist follow-up: 4/16/25 at 10:20am, Telehealth visit  Patient is not followed in [facility].    Thank You,  Sola Alcaraz, [facility]  Clinical Pharmacy Specialist  [phone]    Continue all  meds under the continuation of care with the referring provider and clinical pharmacy team.  Verbal consent to manage patient's drug therapy was obtained from the patient. They were informed they may decline to participate or withdraw from participation in pharmacy services at any time.

## 2025-03-25 PROCEDURE — RXMED WILLOW AMBULATORY MEDICATION CHARGE

## 2025-03-25 RX ORDER — TIRZEPATIDE 7.5 MG/.5ML
7.5 INJECTION, SOLUTION SUBCUTANEOUS WEEKLY
Qty: 2 ML | Refills: 0 | Status: SHIPPED | OUTPATIENT
Start: 2025-03-25

## 2025-03-27 ENCOUNTER — PHARMACY VISIT (OUTPATIENT)
Dept: PHARMACY | Facility: CLINIC | Age: 80
End: 2025-03-27
Payer: COMMERCIAL

## 2025-04-08 DIAGNOSIS — Z79.4 TYPE 2 DIABETES MELLITUS WITH OTHER SPECIFIED COMPLICATION, WITH LONG-TERM CURRENT USE OF INSULIN: ICD-10-CM

## 2025-04-08 DIAGNOSIS — E11.69 TYPE 2 DIABETES MELLITUS WITH OTHER SPECIFIED COMPLICATION, WITH LONG-TERM CURRENT USE OF INSULIN: ICD-10-CM

## 2025-04-08 PROCEDURE — RXMED WILLOW AMBULATORY MEDICATION CHARGE

## 2025-04-08 RX ORDER — INSULIN GLARGINE 100 [IU]/ML
40 INJECTION, SOLUTION SUBCUTANEOUS NIGHTLY
Qty: 30 ML | Refills: 3 | Status: SHIPPED | OUTPATIENT
Start: 2025-04-08 | End: 2026-04-08

## 2025-04-10 ENCOUNTER — PHARMACY VISIT (OUTPATIENT)
Dept: PHARMACY | Facility: CLINIC | Age: 80
End: 2025-04-10
Payer: COMMERCIAL

## 2025-04-11 ENCOUNTER — APPOINTMENT (OUTPATIENT)
Dept: OPHTHALMOLOGY | Facility: CLINIC | Age: 80
End: 2025-04-11
Payer: COMMERCIAL

## 2025-04-11 DIAGNOSIS — H40.1131 PRIMARY OPEN ANGLE GLAUCOMA OF BOTH EYES, MILD STAGE: Primary | ICD-10-CM

## 2025-04-11 PROCEDURE — 92020 GONIOSCOPY: CPT | Performed by: OPHTHALMOLOGY

## 2025-04-11 PROCEDURE — 99213 OFFICE O/P EST LOW 20 MIN: CPT | Performed by: OPHTHALMOLOGY

## 2025-04-11 ASSESSMENT — CONF VISUAL FIELD
OD_INFERIOR_NASAL_RESTRICTION: 0
OD_INFERIOR_TEMPORAL_RESTRICTION: 0
OS_INFERIOR_TEMPORAL_RESTRICTION: 0
OD_NORMAL: 1
OS_SUPERIOR_TEMPORAL_RESTRICTION: 0
OS_SUPERIOR_NASAL_RESTRICTION: 0
OD_SUPERIOR_TEMPORAL_RESTRICTION: 0
OD_SUPERIOR_NASAL_RESTRICTION: 0
OS_INFERIOR_NASAL_RESTRICTION: 0
OS_NORMAL: 1

## 2025-04-11 ASSESSMENT — ENCOUNTER SYMPTOMS
ALLERGIC/IMMUNOLOGIC NEGATIVE: 0
ENDOCRINE NEGATIVE: 0
NEUROLOGICAL NEGATIVE: 0
MUSCULOSKELETAL NEGATIVE: 0
HEMATOLOGIC/LYMPHATIC NEGATIVE: 0
GASTROINTESTINAL NEGATIVE: 0
EYES NEGATIVE: 1
CARDIOVASCULAR NEGATIVE: 0
RESPIRATORY NEGATIVE: 0
PSYCHIATRIC NEGATIVE: 0
CONSTITUTIONAL NEGATIVE: 0

## 2025-04-11 ASSESSMENT — VISUAL ACUITY
METHOD: SNELLEN - LINEAR
OS_SC+: -2
OD_SC: 20/70
OS_SC: 20/25

## 2025-04-11 ASSESSMENT — GONIOSCOPY
OD_SUPERIOR: D45R 3+ PTM
OD_INFERIOR: D45R 3+ PTM
OS_NASAL: D45R TR PTM
OS_SUPERIOR: D45R TR PTM
OS_TEMPORAL: D45R TR PTM
OD_NASAL: D45R 3+ PTM
OS_INFERIOR: D45R TR PTM
OD_TEMPORAL: D45R 3+ PTM

## 2025-04-11 ASSESSMENT — TONOMETRY
OS_IOP_MMHG: 12
IOP_METHOD: GOLDMANN APPLANATION
OD_IOP_MMHG: 13

## 2025-04-11 ASSESSMENT — PACHYMETRY
OD_CT(UM): 562
OS_CT(UM): 551

## 2025-04-11 ASSESSMENT — EXTERNAL EXAM - RIGHT EYE: OD_EXAM: NORMAL

## 2025-04-11 ASSESSMENT — CUP TO DISC RATIO
OD_RATIO: 0.6
OS_RATIO: 0.3

## 2025-04-11 ASSESSMENT — SLIT LAMP EXAM - LIDS
COMMENTS: NORMAL
COMMENTS: NORMAL

## 2025-04-11 ASSESSMENT — EXTERNAL EXAM - LEFT EYE: OS_EXAM: NORMAL

## 2025-04-11 NOTE — PROGRESS NOTES
Visual Acuity (Snellen - Linear)         Right Left    Dist sc 20/70 20/25 -2    Dist ph sc NI           Tonometry       Tonometry (Goldmann Applanation, 9:25 AM)         Right Left    Pressure 13 12                  Assessment/Plan   Last dilated:  7/5/24  Last gonio 4/11/25 OS:  D45r 3+ PTM    OS:  D45r tr PTM    1.  Pre-Perimetric Glaucoma OU:  /550.  Tm 27/24.  IOP borderline.  dorzolamide/timolol -> severe itching,  latanoprost -> itching (no better than timolol by itself as far as IOP control).  Pt with severe itching and now with some follicles indicating that there may be allergy to brimonidine.  However, this could also be a FRANDY issue.  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    SLT OD had been recommended, but pt had complications from diabetes and was unable to have.  IOP OD is now controlled.  RNFL shows some generalized reduction, but no glaucoma specific changes so this is an acceptable IOP.      Plan:  cont zioptan OU QHS               cont PF cosopt OU BID               F/u 4 months  (HVF, dilation, RNFL) - if stable, then t/c returning to Q6 month follow ups    2.  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        Plan:  as per Dr. Syed    3.  Pseudophakia (PCIOL) OU:  s/p YAG cap OD      Plan:  monitor    4.  ERM OD:  c/w radiation retinopathy.  Recently getting injections for blood vessel leakage      Plan:  as per Dr. Syed    5. DME OD: s/p eyelea intravitreal injection OD 4/18/22 by Dr. Syed      Plan: as per Dr. Syed

## 2025-04-15 NOTE — PROGRESS NOTES
Clinical Pharmacy Appointment    Patient ID: Luba Cortez is a 79 y.o. female who presents for Type 2 Diabetes Mellitus.    Pt is here for Follow Up appointment.   Referring Provider: Sampson Sheth, *  PCP: Sampson Sheth MD, last visit: 2/26/25, next visit: 7/24/25    Subjective   HPI  PMH significant for T2DM, obesity, gastroparesis, HTN, HLD, CKD, glaucoma, retinopathy, GERD.  Special needs/barriers to therapy: None identified    Medication System Management  Patients preferred pharmacy: [facility] home delivery   Adherence/Organization: No current concerns  Affordability/Accessibility: No current concerns, [facility]    [facility] Patient Assistance for Jardiance, Lantus, and Mounjaro approved through 5/3/25. Will have to be renewed prior to that date to prevent lapse in coverage. Medication(s) will be received at no cost to patient from [facility].     Drug Interactions  No relevant drug interactions were noted.    DIABETES MELLITUS Type 2:    Known diabetic complications: retinopathy, autonomic neuropathy, chronic kidney disease, and obesity.  Hx or FH Hx of MTC/MEN2?: No   Pancreatitis?: No   Gastroparesis?: No  UTI/Yeast Infections?: No    Follows with Endocrinology?: No  Diabetic Eye Exam: Following with ophthalmology  Monofilament Foot Exam: Needs completed, last unknown     Current diabetic medications include:  Jardiance 25mg daily  Lantus 52 units daily in PM  Metformin 1000mg BID  Mounjaro 7.5mg once weekly (Wednesdays)  Previous medications: Rybelsus (ineffective)     Clarifications to above regimen: none  Adverse Effects: none    Glucose Readings:  Glucometer/CGM Type: FreeStyle Jasper 3    Previous home BG readings: (3/25/25)  Average Glucose    7-day 14-day 30-day   Average 148 149 150      Time in Target    7-day 14-day 30-day   Above (>180) 13% 15% 17%   In Target () 87% 83% 83%   Below (<70) 0% 0% 0%      Current home BG readings:   Review History --> Average Glucose  "(last option) --> Use arrows on bottom to change # of days  Review History --> Scroll to next page --> Time in Target (2nd option) Use arrows on bottom to change # of days  Average Glucose   7-day 14-day 30-day   Average 165 157 154     Time in Target   7-day 14-day 30-day   Above (>180) 29% 26% 21%   In Target () 71% 74% 79%   Below (<70) 0% 0% 0%     Any episodes of hypoglycemia? No   Did patient treat episode of hypoglycemia appropriately? N/A  Does the patient have a prescription for ready-to-use Glucagon? N/A    Lifestyle:  Diet: meat, potatoes, vegetables, fruit, salads    Risk Reducing Medications:  Statin? Yes, Atorvastatin 10mg daily  ACE-I/ARB? Yes, Valsartan 320mg daily    WEIGHT LOSS:   BMI Readings from Last 1 Encounters:   02/26/25 27.10 kg/m²   Starting weight: 201 lbs. (12/4/23)  Previous weight: 169 lbs. (3/25/25)  Current weight: 169 lbs.    Preventative Care  Immunizations Needed: RSV and Tdap  Tobacco Use: former smoker, quit 2011     Objective   Allergies   Allergen Reactions    Adhesive Tape-Silicones Unknown    Codeine Itching, Nausea Only, Other and Unknown     n/v    Iodinated Contrast Media Unknown    Iodine Unknown    Liraglutide Diarrhea, Nausea Only, Headache and Unknown     victoza     Social History     Social History Narrative    Not on file      Medication Review  Current Outpatient Medications   Medication Instructions    albuterol (Proventil HFA) 90 mcg/actuation inhaler 2 puffs, inhalation, Every 4 hours PRN    alcohol swabs pads, medicated Use as directed to check blood glucose once daily.    aspirin 81 mg EC tablet 1 tablet, Daily    atorvastatin (LIPITOR) 10 mg, oral, Daily    BD Luer-Ella Syringe 3 mL 25 gauge x 1\" syringe use weekly with B-12 INJECTIONS as directed    blood sugar diagnostic (OneTouch Ultra Test) strip Use as directed to check blood glucose once daily.    blood-glucose meter (OneTouch Ultra2 Meter) misc Use as directed to monitor blood glucose daily.    " "calcium carbonate-vitamin D3 500 mg-5 mcg (200 unit) tablet 1 tablet, Daily    cholecalciferol (VITAMIN D-3) 50,000 Units, oral, Once Weekly    cyanocobalamin (VITAMIN B-12) 1,000 mcg, intramuscular, Every 30 days    Debrox 6.5 % otic solution Administer into affected ear(s).    dorzolamide-timolol, PF, (Cosopt, PF,) 2-0.5 % ophthalmic solution 1 drop, Both Eyes, 2 times daily    Droplet Pen Needle 32 gauge x 5/32\" needle use 1 PEN NEEDLE to inject MEDICATION subcutaneously six times a day    escitalopram (LEXAPRO) 10 mg, oral, Nightly    fenofibrate (TRICOR) 145 mg, oral, Daily    Jardiance 25 mg, oral, Daily    lancets (OneTouch Delica Plus Lancet) 33 gauge misc Use as directed to check blood glucose once daily.    Lantus Solostar U-100 Insulin 40 Units, subcutaneous, Nightly    levothyroxine (SYNTHROID, LEVOXYL) 112 mcg, oral, Daily, Take on an empty stomach at the same time each day, either 30 to 60 minutes prior to breakfast    metoprolol tartrate (LOPRESSOR) 50 mg, oral, 2 times daily    Mounjaro 7.5 mg, subcutaneous, Weekly    tafluprost, PF, 0.0015 % dropperette 1 drop, ophthalmic (eye), Nightly    tamoxifen (NOLVADEX) 20 mg, oral, Daily    valsartan (DIOVAN) 320 mg, oral, Daily      Vitals  BP Readings from Last 2 Encounters:   02/26/25 118/62   11/26/24 126/74     Labs  A1C  Lab Results   Component Value Date    HGBA1C 5.9 02/26/2025    HGBA1C 6.5 11/26/2024    HGBA1C 6.1 06/04/2024     BMP  Lab Results   Component Value Date    CALCIUM 10.1 11/29/2024     11/29/2024    K 5.4 (H) 11/29/2024    CO2 26 11/29/2024     (H) 11/29/2024    BUN 23 11/29/2024    CREATININE 1.32 (H) 11/29/2024    EGFR 41 (L) 11/29/2024     LFTs  Lab Results   Component Value Date    ALT 13 11/29/2024    AST 13 11/29/2024    ALKPHOS 70 11/29/2024    BILITOT 0.4 11/29/2024     FLP  Lab Results   Component Value Date    TRIG 163 (H) 09/12/2023    CHOL 104 09/12/2023    LDLF 43 09/12/2023    HDL 28.0 (A) 09/12/2023     Urine " "Microalbumin  No results found for: \"MICROALBCREA\"  Weight Management  Wt Readings from Last 3 Encounters:   02/26/25 78.5 kg (173 lb)   01/13/25 78 kg (172 lb)   11/26/24 81.2 kg (179 lb)      There is no height or weight on file to calculate BMI.     Assessment/Plan     DIABETES MELLITUS:   Patient's A1c is 5.9% on 2/26/25. Goal A1c <7%.  Patient's SMBGs are 30 day avg 154 and Time in Target 79%.     Rationale for plan: Patient's BG have increased slightly since last appointment but are still in goal range for time in target. No episodes of hypoglycemia. Plan to continue current medication regimen at this time. Follow up in 4 weeks.     Medication Changes:  No Medication Changes     WEIGHT LOSS:   Patient's current weight reported as 169 lbs. Has lost 32 lbs. (16% of TBW) since starting therapy plan.  Current regimen includes: Mounjaro 7.5mg once weekly  Rationale for plan: Patient is tolerating Mounjaro well, no adverse effects reported. No changes in weight since decreasing back to Mounjaro 7.5mg dose. Due to tolerability and weight in healthy range, will continue current regimen. Follow up in 4 weeks.     Medication Changes:  No Medication Changes      Patient Assistance Screening (VAF)  Patient verbally reports monthly or yearly income which is less than 400% federal poverty level  Application for program has been submitted for the following medications:   Jardiance  Lantus  Mounjaro  Patient aware this process may take up to 2 weeks once income documents have been sent to the team.  If approved, medication must be filled through [facility] pharmacy and may be picked up or mailed to patient.   If approved, medication will be billed through insurance, and patient assistance team will pay the copay. This will result in a $0 copay for the patient.  Counseled patient on mechanism of action, side effects, contraindications, and what to do if the patient misses a dose. All patients questions were answered. "     Patient will email financial documents to pharmacist, then PAP renewal application will be submitted.     Patient Education:  Counseled patient on relevant medication mechanisms of action, expectations, side effects, duration of therapy, contraindications, administration, and monitoring parameters.  All questions and concerns addressed. Contact pharmacist with any further questions or concerns prior to next appointment.  Reviewed CGM BG goals: Target range , Time in Range Goal >70%, GMI goal <7%    Clinical Pharmacist follow-up: 5/14/25 10:40AM, Telehealth visit    Thank You,  Elvira Walker, PharmD  PGY-1 Pharmacy Resident    Continue all meds under the continuation of care with the referring provider and clinical pharmacy team.  Verbal consent to manage patient's drug therapy was obtained from the patient. They were informed they may decline to participate or withdraw from participation in pharmacy services at any time.

## 2025-04-16 ENCOUNTER — APPOINTMENT (OUTPATIENT)
Dept: PHARMACY | Facility: HOSPITAL | Age: 80
End: 2025-04-16
Payer: COMMERCIAL

## 2025-04-16 DIAGNOSIS — E11.69 TYPE 2 DIABETES MELLITUS WITH OTHER SPECIFIED COMPLICATION, WITH LONG-TERM CURRENT USE OF INSULIN: ICD-10-CM

## 2025-04-16 DIAGNOSIS — Z86.39 HISTORY OF DIABETES MELLITUS: ICD-10-CM

## 2025-04-16 DIAGNOSIS — Z79.4 TYPE 2 DIABETES MELLITUS WITH OTHER SPECIFIED COMPLICATION, WITH LONG-TERM CURRENT USE OF INSULIN: ICD-10-CM

## 2025-04-16 RX ORDER — INSULIN GLARGINE 100 [IU]/ML
52 INJECTION, SOLUTION SUBCUTANEOUS NIGHTLY
Qty: 45 ML | Refills: 3 | Status: SHIPPED | OUTPATIENT
Start: 2025-04-16

## 2025-04-16 RX ORDER — TIRZEPATIDE 7.5 MG/.5ML
7.5 INJECTION, SOLUTION SUBCUTANEOUS WEEKLY
Qty: 2 ML | Refills: 3 | Status: SHIPPED | OUTPATIENT
Start: 2025-04-16

## 2025-04-18 PROCEDURE — RXMED WILLOW AMBULATORY MEDICATION CHARGE

## 2025-04-22 ENCOUNTER — PHARMACY VISIT (OUTPATIENT)
Dept: PHARMACY | Facility: CLINIC | Age: 80
End: 2025-04-22
Payer: COMMERCIAL

## 2025-05-05 ENCOUNTER — APPOINTMENT (OUTPATIENT)
Dept: OPHTHALMOLOGY | Facility: CLINIC | Age: 80
End: 2025-05-05
Payer: COMMERCIAL

## 2025-05-05 DIAGNOSIS — M35.00 SICCA SYNDROME (MULTI): ICD-10-CM

## 2025-05-05 DIAGNOSIS — C69.91: ICD-10-CM

## 2025-05-05 DIAGNOSIS — E11.3211 MILD NONPROLIFERATIVE DIABETIC RETINOPATHY OF RIGHT EYE WITH MACULAR EDEMA ASSOCIATED WITH TYPE 2 DIABETES MELLITUS: Primary | ICD-10-CM

## 2025-05-05 PROCEDURE — 99213 OFFICE O/P EST LOW 20 MIN: CPT | Performed by: OPHTHALMOLOGY

## 2025-05-05 ASSESSMENT — PACHYMETRY
OD_CT(UM): 562
OS_CT(UM): 551

## 2025-05-05 ASSESSMENT — VISUAL ACUITY
OS_SC: 20/30
OD_SC+: +1
METHOD: SNELLEN - LINEAR
OD_SC: 20/200
OD_PH_SC: 20/60

## 2025-05-05 ASSESSMENT — ENCOUNTER SYMPTOMS
MUSCULOSKELETAL NEGATIVE: 0
EYES NEGATIVE: 1
ALLERGIC/IMMUNOLOGIC NEGATIVE: 0
HEMATOLOGIC/LYMPHATIC NEGATIVE: 0
ENDOCRINE NEGATIVE: 0
NEUROLOGICAL NEGATIVE: 0
CONSTITUTIONAL NEGATIVE: 0
RESPIRATORY NEGATIVE: 0
CARDIOVASCULAR NEGATIVE: 0
GASTROINTESTINAL NEGATIVE: 0
PSYCHIATRIC NEGATIVE: 0

## 2025-05-05 ASSESSMENT — TONOMETRY
OS_IOP_MMHG: 16
OD_IOP_MMHG: 15
IOP_METHOD: GOLDMANN APPLANATION

## 2025-05-05 NOTE — PROGRESS NOTES
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  -     OCT, Retina - OU - Both Eyes  Sicca syndrome (Multi)  Malignant neoplasm of unspecified site of right eye (Multi)      Impression    1 E11.3311 Controlled type 2 diabetes mellitus with right eye affected by moderate nonproliferative retinopathy and macular edema-Worsening  2 H43.11 Vitreous hemorrhage of right eye-Worsening  3 H40.053 Ocular hypertension, bilateral-Stable  4 T66.XXXA Radiation retinopathy-Improving  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  -appears stable  -Last CT abd pelvis 9/2017: no liver metastasis- small nodule that has not changed.    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 Healthy lifestyle.       4) OHTN OU, glaucoma suspect  -continue FU and gtts per Dr. Lim.     5 today has DME OD       TREAT RIGHT    Treatment options for DME OD discussed, including observation, anti-VEGF injections (including Avastin, Lucentis, Beovu, Vabysmo and Eylea) and laser. Recommend anti-VEGF injections. Eylea done OD today in a sterile manner with Betadine 5% for antisepsis.

## 2025-05-07 PROCEDURE — RXMED WILLOW AMBULATORY MEDICATION CHARGE

## 2025-05-08 ENCOUNTER — PHARMACY VISIT (OUTPATIENT)
Dept: PHARMACY | Facility: CLINIC | Age: 80
End: 2025-05-08
Payer: COMMERCIAL

## 2025-05-13 NOTE — PROGRESS NOTES
Clinical Pharmacy Appointment    Patient ID: Luba Cortez is a 79 y.o. female who presents for Type 2 Diabetes Mellitus.    Pt is here for Follow Up appointment.   Referring Provider: Sampson Sheth, *  PCP: Sampson Sheth MD, last visit: 2/26/25, next visit: 7/24/25    Subjective   HPI  PMH significant for T2DM, obesity, gastroparesis, HTN, HLD, CKD, glaucoma, retinopathy, GERD.  Special needs/barriers to therapy: None identified    Medication System Management  Patients preferred pharmacy: [facility] home delivery   Adherence/Organization: No current concerns  Affordability/Accessibility: No current concerns, [facility]    [facility] Patient Assistance for Jardiance, Lantus, and Mounjaro approved through 4/23/26. Will have to be renewed prior to that date to prevent lapse in coverage. Medication(s) will be received at no cost to patient from [facility].     Drug Interactions  No relevant drug interactions were noted.    DIABETES MELLITUS Type 2:    Known diabetic complications: retinopathy, autonomic neuropathy, chronic kidney disease, and obesity.  Hx or FH Hx of MTC/MEN2?: No   Pancreatitis?: No   Gastroparesis?: No  UTI/Yeast Infections?: No    Follows with Endocrinology?: No  Diabetic Eye Exam: Following with ophthalmology  Monofilament Foot Exam: Needs completed, last unknown     Current diabetic medications include:  Jardiance 25mg daily  Lantus 52 units daily in PM  Metformin 1000mg BID  Mounjaro 7.5mg once weekly (Wednesdays)  Previous medications: Rybelsus (ineffective)     Clarifications to above regimen: none  Adverse Effects: none     Glucose Readings:  Glucometer/CGM Type: FreeStyle Jasper 3     Previous home BG readings: (4/16/25)  Average Glucose    7-day 14-day 30-day   Average 165 157 154      Time in Target    7-day 14-day 30-day   Above (>180) 29% 26% 21%   In Target () 71% 74% 79%   Below (<70) 0% 0% 0%     Current home BG readings:   Review History --> Average Glucose  "(last option) --> Use arrows on bottom to change # of days  Review History --> Scroll to next page --> Time in Target (2nd option) Use arrows on bottom to change # of days  Average Glucose   7-day 14-day 30-day   Average 166 173 171     Time in Target   7-day 14-day 30-day   Above (>180) 31% 40% 37%   In Target () 69% 60% 63%   Below (<70) 0% 0% 0%     Any episodes of hypoglycemia? Yes - reported one low BG of 40 around 9PM   Did patient treat episode of hypoglycemia appropriately? Yes, drank 6 oz juice  Does the patient have a prescription for ready-to-use Glucagon? No    Risk Reducing Medications:  Statin? Yes, Atorvastatin 10mg daily  ACE-I/ARB? Yes, Valsartan 320mg daily    WEIGHT LOSS:   BMI Readings from Last 1 Encounters:   02/26/25 27.10 kg/m²   Starting weight: 201 lbs. (12/4/23)  Previous weight: 169 lbs. (4/16/25)  Current weight: 172 lbs.    Preventative Care  Immunizations Needed: RSV and Tdap  Tobacco Use: former smoker, quit 2011     Objective   Allergies[1]  Social History     Social History Narrative    Not on file      Medication Review  Current Outpatient Medications   Medication Instructions    albuterol (Proventil HFA) 90 mcg/actuation inhaler 2 puffs, inhalation, Every 4 hours PRN    alcohol swabs pads, medicated Use as directed to check blood glucose once daily.    aspirin 81 mg EC tablet 1 tablet, Daily    atorvastatin (LIPITOR) 10 mg, oral, Daily    BD Luer-Ella Syringe 3 mL 25 gauge x 1\" syringe use weekly with B-12 INJECTIONS as directed    blood sugar diagnostic (OneTouch Ultra Test) strip Use as directed to check blood glucose once daily.    blood-glucose meter (OneTouch Ultra2 Meter) misc Use as directed to monitor blood glucose daily.    calcium carbonate-vitamin D3 500 mg-5 mcg (200 unit) tablet 1 tablet, Daily    cholecalciferol (VITAMIN D-3) 50,000 Units, oral, Once Weekly    cyanocobalamin (VITAMIN B-12) 1,000 mcg, intramuscular, Every 30 days    Debrox 6.5 % otic solution " "Administer into affected ear(s).    dorzolamide-timolol, PF, (Cosopt, PF,) 2-0.5 % ophthalmic solution 1 drop, Both Eyes, 2 times daily    Droplet Pen Needle 32 gauge x 5/32\" needle use 1 PEN NEEDLE to inject MEDICATION subcutaneously six times a day    escitalopram (LEXAPRO) 10 mg, oral, Nightly    fenofibrate (TRICOR) 145 mg, oral, Daily    insulin glargine (LANTUS) 52 Units, subcutaneous, Nightly, Take as directed per insulin instructions.    Jardiance 25 mg, oral, Daily    lancets (OneTouch Delica Plus Lancet) 33 gauge misc Use as directed to check blood glucose once daily.    levothyroxine (SYNTHROID, LEVOXYL) 112 mcg, oral, Daily, Take on an empty stomach at the same time each day, either 30 to 60 minutes prior to breakfast    metoprolol tartrate (LOPRESSOR) 50 mg, oral, 2 times daily    Mounjaro 7.5 mg, subcutaneous, Weekly    tafluprost, PF, 0.0015 % dropperette 1 drop, ophthalmic (eye), Nightly    tamoxifen (NOLVADEX) 20 mg, oral, Daily    valsartan (DIOVAN) 320 mg, oral, Daily      Vitals  BP Readings from Last 2 Encounters:   02/26/25 118/62   11/26/24 126/74     Labs  A1C  Lab Results   Component Value Date    HGBA1C 5.9 02/26/2025    HGBA1C 6.5 11/26/2024    HGBA1C 6.1 06/04/2024     BMP  Lab Results   Component Value Date    CALCIUM 10.1 11/29/2024     11/29/2024    K 5.4 (H) 11/29/2024    CO2 26 11/29/2024     (H) 11/29/2024    BUN 23 11/29/2024    CREATININE 1.32 (H) 11/29/2024    EGFR 41 (L) 11/29/2024     LFTs  Lab Results   Component Value Date    ALT 13 11/29/2024    AST 13 11/29/2024    ALKPHOS 70 11/29/2024    BILITOT 0.4 11/29/2024     FLP  Lab Results   Component Value Date    TRIG 163 (H) 09/12/2023    CHOL 104 09/12/2023    LDLF 43 09/12/2023    HDL 28.0 (A) 09/12/2023     Urine Microalbumin  No results found for: \"MICROALBCREA\"  Weight Management  Wt Readings from Last 3 Encounters:   02/26/25 78.5 kg (173 lb)   01/13/25 78 kg (172 lb)   11/26/24 81.2 kg (179 lb)      There is " no height or weight on file to calculate BMI.     Assessment/Plan   Problem List Items Addressed This Visit       Diabetes mellitus, type 2 (Multi)    Patient's A1c is 5.9% on 2/26/25. Goal A1c <7%.  Patient's SMBGs are above goal, 30 day avg  and Time in Target 63%.     Rationale for plan: Patient's BG are elevated from last appointment. She reported increased appetite with decreased dose of Mounjaro. One episode of hypoglycemia reported, treated appropriately with juice. No adverse effects reported. Due to BG above goal, will increase Mounjaro to 10mg once weekly. Counseled patient to contact pharmacy prior to next appointment if BG become too low or any other issues arise. Follow up in 4 weeks.     Medication Changes:  Increase: Mounjaro from 7.5mg once weekly to 10mg once weekly.            WEIGHT LOSS:   Patient's current weight reported as 172 lbs. Has lost 29 lbs. (14.4% of TBW) since starting therapy plan.  Current regimen includes: Mounjaro 7.5mg once weekly   Rationale for plan: Patient is tolerating Mounjaro well. No adverse effects reported. She reported appetite has increased since decreasing Mounjaro dose. Due to BG above goal and per patient preference for appetite control, plan to increase Mounjaro to 10mg once weekly. Follow up in 4 weeks.     Medication Changes:  Increase:  Mounjaro from 7.5mg once weekly to 10mg once weekly.      Patient Education:  Counseled patient on relevant medication mechanisms of action, expectations, side effects, duration of therapy, contraindications, administration, and monitoring parameters.  All questions and concerns addressed. Contact pharmacist with any further questions or concerns prior to next appointment.  Reviewed CGM BG goals: Target range , Time in Range Goal >70%, GMI goal <7%%    Clinical Pharmacist follow-up: 6/11/25 10:40AM, Telehealth visit    Thank You,  Elvira Walker, PharmD  PGY-1 Pharmacy Resident    Continue all meds under the  continuation of care with the referring provider and clinical pharmacy team.  Verbal consent to manage patient's drug therapy was obtained from the patient. They were informed they may decline to participate or withdraw from participation in pharmacy services at any time.         [1]   Allergies  Allergen Reactions    Adhesive Tape-Silicones Unknown    Codeine Itching, Nausea Only, Other and Unknown     n/v    Iodinated Contrast Media Unknown    Iodine Unknown    Liraglutide Diarrhea, Nausea Only, Headache and Unknown     victoza

## 2025-05-14 ENCOUNTER — PHARMACY VISIT (OUTPATIENT)
Dept: PHARMACY | Facility: CLINIC | Age: 80
End: 2025-05-14
Payer: COMMERCIAL

## 2025-05-14 ENCOUNTER — APPOINTMENT (OUTPATIENT)
Dept: PHARMACY | Facility: HOSPITAL | Age: 80
End: 2025-05-14
Payer: COMMERCIAL

## 2025-05-14 DIAGNOSIS — Z79.4 TYPE 2 DIABETES MELLITUS WITH OTHER SPECIFIED COMPLICATION, WITH LONG-TERM CURRENT USE OF INSULIN: ICD-10-CM

## 2025-05-14 DIAGNOSIS — E11.69 TYPE 2 DIABETES MELLITUS WITH OTHER SPECIFIED COMPLICATION, WITH LONG-TERM CURRENT USE OF INSULIN: ICD-10-CM

## 2025-05-14 PROCEDURE — RXMED WILLOW AMBULATORY MEDICATION CHARGE

## 2025-05-14 RX ORDER — TIRZEPATIDE 10 MG/.5ML
10 INJECTION, SOLUTION SUBCUTANEOUS
Qty: 2 ML | Refills: 3 | Status: SHIPPED | OUTPATIENT
Start: 2025-05-14

## 2025-05-14 NOTE — ASSESSMENT & PLAN NOTE
Patient's A1c is 5.9% on 2/26/25. Goal A1c <7%.  Patient's SMBGs are above goal, 30 day avg  and Time in Target 63%.     Rationale for plan: Patient's BG are elevated from last appointment. She reported increased appetite with decreased dose of Mounjaro. One episode of hypoglycemia reported, treated appropriately with juice. No adverse effects reported. Due to BG above goal, will increase Mounjaro to 10mg once weekly. Counseled patient to contact pharmacy prior to next appointment if BG become too low or any other issues arise. Follow up in 4 weeks.     Medication Changes:  Increase: Mounjaro from 7.5mg once weekly to 10mg once weekly.

## 2025-05-26 DIAGNOSIS — E78.1 HYPERTRIGLYCERIDEMIA: ICD-10-CM

## 2025-05-26 DIAGNOSIS — I10 HYPERTENSION, UNSPECIFIED TYPE: ICD-10-CM

## 2025-05-26 RX ORDER — METOPROLOL TARTRATE 50 MG/1
50 TABLET ORAL 2 TIMES DAILY
Qty: 180 TABLET | Refills: 3 | Status: SHIPPED | OUTPATIENT
Start: 2025-05-26

## 2025-05-26 RX ORDER — FENOFIBRATE 145 MG/1
145 TABLET, FILM COATED ORAL DAILY
Qty: 90 TABLET | Refills: 3 | Status: SHIPPED | OUTPATIENT
Start: 2025-05-26

## 2025-05-29 DIAGNOSIS — I10 HYPERTENSION, UNSPECIFIED TYPE: ICD-10-CM

## 2025-05-29 RX ORDER — VALSARTAN 320 MG/1
320 TABLET ORAL DAILY
Qty: 90 TABLET | Refills: 3 | Status: SHIPPED | OUTPATIENT
Start: 2025-05-29

## 2025-06-03 DIAGNOSIS — H40.1131 PRIMARY OPEN ANGLE GLAUCOMA OF BOTH EYES, MILD STAGE: ICD-10-CM

## 2025-06-03 DIAGNOSIS — Z79.4 TYPE 2 DIABETES MELLITUS WITH OTHER SPECIFIED COMPLICATION, WITH LONG-TERM CURRENT USE OF INSULIN: ICD-10-CM

## 2025-06-03 DIAGNOSIS — E11.69 TYPE 2 DIABETES MELLITUS WITH OTHER SPECIFIED COMPLICATION, WITH LONG-TERM CURRENT USE OF INSULIN: ICD-10-CM

## 2025-06-03 PROCEDURE — RXMED WILLOW AMBULATORY MEDICATION CHARGE

## 2025-06-03 RX ORDER — DORZOLAMIDE HYDROCHLORIDE AND TIMOLOL MALEATE PRESERVATIVE FREE 20; 5 MG/ML; MG/ML
1 SOLUTION/ DROPS OPHTHALMIC 2 TIMES DAILY
Qty: 60 EACH | Refills: 11 | Status: SHIPPED | OUTPATIENT
Start: 2025-06-03 | End: 2026-06-03

## 2025-06-03 RX ORDER — TAFLUPROST OPTHALMIC 0 MG/.3ML
1 SOLUTION/ DROPS OPHTHALMIC NIGHTLY
Qty: 30 EACH | Refills: 11 | Status: SHIPPED | OUTPATIENT
Start: 2025-06-03 | End: 2026-06-03

## 2025-06-03 RX ORDER — INSULIN GLARGINE 100 [IU]/ML
52 INJECTION, SOLUTION SUBCUTANEOUS NIGHTLY
Qty: 45 ML | Refills: 3 | Status: SHIPPED | OUTPATIENT
Start: 2025-06-03

## 2025-06-04 ENCOUNTER — PHARMACY VISIT (OUTPATIENT)
Dept: PHARMACY | Facility: CLINIC | Age: 80
End: 2025-06-04
Payer: COMMERCIAL

## 2025-06-10 NOTE — PROGRESS NOTES
Clinical Pharmacy Appointment    Patient ID: Luba Cortez is a 80 y.o. female who presents for Type 2 Diabetes Mellitus.    Pt is here for Follow Up appointment.   Referring Provider: Sampson Sheth, *  PCP: Sampson Sheth MD, last visit: 2/26/25, next visit: 7/24/25    Subjective   HPI  PMH significant for T2DM, obesity, gastroparesis, HTN, HLD, CKD, glaucoma, retinopathy, GERD.  Special needs/barriers to therapy: None identified    Medication System Management  Patients preferred pharmacy: [facility] home delivery  Adherence/Organization: No current concerns  Affordability/Accessibility: No current concerns, [facility] P[facility]    [facility] Patient Assistance for Jardiance, Lantus, and Mounjaro approved through 4/23/26. Will have to be renewed prior to that date to prevent lapse in coverage. Medication(s) will be received at no cost to patient from [facility].     Drug Interactions  No relevant drug interactions were noted.    DIABETES MELLITUS Type 2:    Known diabetic complications: retinopathy, autonomic neuropathy, chronic kidney disease, and obesity.  Hx or FH Hx of MTC/MEN2?: No   Pancreatitis?: No   Gastroparesis?: yes  UTI/Yeast Infections?: No    Follows with Endocrinology?: No  Diabetic Eye Exam: Following with ophthalmology  Monofilament Foot Exam: Needs completed, last unknown     Current diabetic medications include:  Jardiance 25mg daily  Lantus 52 units daily in PM  Metformin 1000mg BID  Mounjaro 10mg once weekly (Wednesdays)  Previous medications: Rybelsus (ineffective)     Clarifications to above regimen: none  Adverse Effects: none    Glucose Readings:  Glucometer/CGM Type: FreeStyle Jasper 3    Previous home BG readings: (5/14/25)  Average Glucose    7-day 14-day 30-day   Average 166 173 171      Time in Target    7-day 14-day 30-day   Above (>180) 31% 40% 37%   In Target () 69% 60% 63%   Below (<70) 0% 0% 0%     Current home BG readings:   Review History --> Average Glucose (last  "option) --> Use arrows on bottom to change # of days  Review History --> Scroll to next page --> Time in Target (2nd option) Use arrows on bottom to change # of days  Average Glucose   7-day 14-day 30-day   Average 156 164 165     Time in Target   7-day 14-day 30-day   Above (>180) 24% 31% 32%   In Target () 76% 69% 68%   Below (<70) 0% 0% 0%     Any episodes of hypoglycemia? No   Did patient treat episode of hypoglycemia appropriately? N/A  Does the patient have a prescription for ready-to-use Glucagon? N/A    Risk Reducing Medications:  Statin? Yes, Atorvastatin 10mg daily  ACE-I/ARB? Yes, Valsartan 320mg daily    WEIGHT LOSS:   BMI Readings from Last 1 Encounters:   02/26/25 27.10 kg/m²   Starting weight: 201 lbs. (12/4/23)  Previous weight: 172 lbs. (5/11/25)  Current weight: 169 lbs.    Preventative Care  Immunizations Needed: RSV  Tobacco Use: former smoker, quit 2011     Objective   Allergies[1]  Social History     Social History Narrative    Not on file      Medication Review  Current Outpatient Medications   Medication Instructions    albuterol (Proventil HFA) 90 mcg/actuation inhaler 2 puffs, inhalation, Every 4 hours PRN    alcohol swabs pads, medicated Use as directed to check blood glucose once daily.    aspirin 81 mg EC tablet 1 tablet, Daily    atorvastatin (LIPITOR) 10 mg, oral, Daily    BD Luer-Ella Syringe 3 mL 25 gauge x 1\" syringe use weekly with B-12 INJECTIONS as directed    blood sugar diagnostic (OneTouch Ultra Test) strip Use as directed to check blood glucose once daily.    blood-glucose meter (OneTouch Ultra2 Meter) misc Use as directed to monitor blood glucose daily.    calcium carbonate-vitamin D3 500 mg-5 mcg (200 unit) tablet 1 tablet, Daily    cholecalciferol (VITAMIN D-3) 50,000 Units, oral, Once Weekly    cyanocobalamin (VITAMIN B-12) 1,000 mcg, intramuscular, Every 30 days    Debrox 6.5 % otic solution Administer into affected ear(s).    dorzolamide-timolol, PF, (Cosopt, PF,) " "2-0.5 % ophthalmic solution 1 drop, Both Eyes, 2 times daily    Droplet Pen Needle 32 gauge x 5/32\" needle use 1 PEN NEEDLE to inject MEDICATION subcutaneously six times a day    escitalopram (LEXAPRO) 10 mg, oral, Nightly    fenofibrate (TRICOR) 145 mg, oral, Daily    Jardiance 25 mg, oral, Daily    lancets (OneTouch Delica Plus Lancet) 33 gauge misc Use as directed to check blood glucose once daily.    Lantus Solostar U-100 Insulin 52 Units, subcutaneous, Nightly, Take as directed per insulin instructions.    levothyroxine (SYNTHROID, LEVOXYL) 112 mcg, oral, Daily, Take on an empty stomach at the same time each day, either 30 to 60 minutes prior to breakfast    metoprolol tartrate (LOPRESSOR) 50 mg, oral, 2 times daily    Mounjaro 10 mg, subcutaneous, Every 7 days    tafluprost, PF, 0.0015 % dropperette 1 drop, ophthalmic (eye), Nightly    tamoxifen (NOLVADEX) 20 mg, oral, Daily    valsartan (DIOVAN) 320 mg, oral, Daily      Vitals  BP Readings from Last 2 Encounters:   02/26/25 118/62   11/26/24 126/74     Labs  A1C  Lab Results   Component Value Date    HGBA1C 5.9 02/26/2025    HGBA1C 6.5 11/26/2024    HGBA1C 6.1 06/04/2024     BMP  Lab Results   Component Value Date    CALCIUM 10.1 11/29/2024     11/29/2024    K 5.4 (H) 11/29/2024    CO2 26 11/29/2024     (H) 11/29/2024    BUN 23 11/29/2024    CREATININE 1.32 (H) 11/29/2024    EGFR 41 (L) 11/29/2024     LFTs  Lab Results   Component Value Date    ALT 13 11/29/2024    AST 13 11/29/2024    ALKPHOS 70 11/29/2024    BILITOT 0.4 11/29/2024     FLP  Lab Results   Component Value Date    TRIG 163 (H) 09/12/2023    CHOL 104 09/12/2023    LDLF 43 09/12/2023    HDL 28.0 (A) 09/12/2023     Urine Microalbumin  No results found for: \"MICROALBCREA\"  Weight Management  Wt Readings from Last 3 Encounters:   02/26/25 78.5 kg (173 lb)   01/13/25 78 kg (172 lb)   11/26/24 81.2 kg (179 lb)      There is no height or weight on file to calculate BMI.     Assessment/Plan "   Problem List Items Addressed This Visit       Diabetes mellitus, type 2 (Multi)    Patient's A1c is 5.9% on 2/26/25. Goal A1c <7%.  Patient's SMBGs are 30 day avg  and Time in Range 68%.     Rationale for plan: Patient's BG have improved since increasing Mounjaro to 10mg once weekly. She reported no adverse effects from medications. No episodes of hypoglycemia. Plan to continue Jardiance 25mg daily, Lantus 52 units daily in PM, Metformin 1000mg BID, Mounjaro 7.5mg once weekly. Follow up in 6 weeks.     Medication Changes:  No Medication Changes           WEIGHT LOSS:   Patient's current weight reported as 169 lbs. Has lost 32 lbs. (16% of TBW) since starting therapy plan.  Current regimen includes: Mounjaro 10mg once weekly  Rationale for plan: Patient is tolerating Mounjaro 10mg dose well, no adverse effects reported. Plan to continue Mounjaro 10mg once weekly. Follow up in 6 weeks.     Medication Changes:  No Medication Changes     Patient Education:  Counseled patient on relevant medication mechanisms of action, expectations, side effects, duration of therapy, contraindications, administration, and monitoring parameters.  All questions and concerns addressed. Contact pharmacist with any further questions or concerns prior to next appointment.  Reviewed CGM BG goals: Target range , Time in Range Goal >70%, GMI goal <7%%    Clinical Pharmacist follow-up: 7/23/25 9:40AM, Telehealth visit    Thank You,  Elvira Walker, PharmD  PGY-1 Pharmacy Resident    Continue all meds under the continuation of care with the referring provider and clinical pharmacy team.  Verbal consent to manage patient's drug therapy was obtained from the patient. They were informed they may decline to participate or withdraw from participation in pharmacy services at any time.         [1]   Allergies  Allergen Reactions    Adhesive Tape-Silicones Unknown    Codeine Itching, Nausea Only, Other and Unknown     n/v    Iodinated  Contrast Media Unknown    Iodine Unknown    Liraglutide Diarrhea, Nausea Only, Headache and Unknown     victoza

## 2025-06-11 ENCOUNTER — APPOINTMENT (OUTPATIENT)
Dept: PHARMACY | Facility: HOSPITAL | Age: 80
End: 2025-06-11
Payer: COMMERCIAL

## 2025-06-11 DIAGNOSIS — E11.69 TYPE 2 DIABETES MELLITUS WITH OTHER SPECIFIED COMPLICATION, WITH LONG-TERM CURRENT USE OF INSULIN: ICD-10-CM

## 2025-06-11 DIAGNOSIS — Z79.4 TYPE 2 DIABETES MELLITUS WITH OTHER SPECIFIED COMPLICATION, WITH LONG-TERM CURRENT USE OF INSULIN: ICD-10-CM

## 2025-06-11 NOTE — ASSESSMENT & PLAN NOTE
Patient's A1c is 5.9% on 2/26/25. Goal A1c <7%.  Patient's SMBGs are 30 day avg  and Time in Range 68%.     Rationale for plan: Patient's BG have improved since increasing Mounjaro to 10mg once weekly. She reported no adverse effects from medications. No episodes of hypoglycemia. Plan to continue Jardiance 25mg daily, Lantus 52 units daily in PM, Metformin 1000mg BID, Mounjaro 7.5mg once weekly. Follow up in 6 weeks.     Medication Changes:  No Medication Changes

## 2025-06-18 PROCEDURE — RXMED WILLOW AMBULATORY MEDICATION CHARGE

## 2025-06-20 ENCOUNTER — PHARMACY VISIT (OUTPATIENT)
Dept: PHARMACY | Facility: CLINIC | Age: 80
End: 2025-06-20
Payer: COMMERCIAL

## 2025-07-23 ENCOUNTER — APPOINTMENT (OUTPATIENT)
Dept: PHARMACY | Facility: HOSPITAL | Age: 80
End: 2025-07-23
Payer: COMMERCIAL

## 2025-07-23 DIAGNOSIS — E11.69 TYPE 2 DIABETES MELLITUS WITH OTHER SPECIFIED COMPLICATION, WITH LONG-TERM CURRENT USE OF INSULIN: ICD-10-CM

## 2025-07-23 DIAGNOSIS — Z79.4 TYPE 2 DIABETES MELLITUS WITH OTHER SPECIFIED COMPLICATION, WITH LONG-TERM CURRENT USE OF INSULIN: ICD-10-CM

## 2025-07-23 PROCEDURE — RXMED WILLOW AMBULATORY MEDICATION CHARGE

## 2025-07-23 NOTE — PROGRESS NOTES
Clinical Pharmacy Appointment    Patient ID: Luba Cortez is a 80 y.o. female who presents for Diabetes.    Pt is here for Follow Up appointment.  Referring Provider: Sampson Sheth, * - last visit: 2/26/25, next visit: 7/24/25  PCP: Sampson Sheth MD     Subjective   HPI  PMH significant for T2DM, obesity, gastroparesis, HTN, HLD, CKD, glaucoma, retinopathy, GERD.   Special needs/barriers to therapy: None identified     Patient Assistance for Jardiance, Lantus, and Mounjaro approved through 4/23/26. Will have to be renewed prior to that date to prevent lapse in coverage. Medication(s) will be received at no cost to patient from [facility].    Medication Reconciliation:  No changes    Drug Interactions  No relevant drug interactions were noted.    Medication System Management  Adherence/Organization: No current concerns  Affordability/Accessibility: No current concerns  Patient's preferred pharmacy:     [id number] - [place], [place] - [address]  [address] [address]  Phone: [phone] Fax: [phone]    [place], [place] - [address]   [address] [facility] [address]  Phone: [phone] Fax: [phone]    [facility]  [address], [address]  [facility] [address]  Phone: [phone] Fax: [phone]       DIABETES MELLITUS Type 2:    Follows with Endocrinology?: No    Pertinent PMH Review  Known diabetic complications:   Chronic Kidney Disease  Retinopathy  Autonomic neuropathy  Obesity  Hx or FH Hx of MTC/MEN2?: No  Pancreatitis?: No  Gastroparesis?: No  UTI/Yeast Infections?: No    Pharmacological Therapy  Current Medications:   Jardiance 25mg daily  Lantus 52 units daily in PM  Metformin 1000mg BID  Mounjaro 10mg once weekly (Wednesdays)  Previous Medications: Rybelsus (ineffective)      Clarifications to above regimen: None  Adverse Effects: None    Glucose  "Monitoring  Glucometer/CGM Type: FreeStyle Jasper 3     Previous home BG readings: (6/11/25) 30-day Avg , TIR 68%  Current home BG readings:   Average Glucose   7-day 14-day 30-day   Average 123 122 138     Time in Target   7-day 14-day 30-day   Above (>180) 4% 5% 15%   In Target () 96% 95% 85%   Below (<70) 0% 0% 0%     Any episodes of hypoglycemia? No   Did patient treat episode of hypoglycemia appropriately? N/A  Does the patient have a prescription for ready-to-use Glucagon? No    Lifestyle  Diet: No recent changes  Physical Activity: No recent changes    Risk-Reducing Medications  Statin? Yes  ACE-I/ARB? Yes    Preventative Care  Diabetic Eye Exam: Following with ophthalmology  Monofilament Foot Exam: Needs completed, last unknown  uACR in past year? No, order pending  Immunizations Needed: RSV and Prevnar  Tobacco Use: former smoker, quit 2011       Objective   Allergies[1]  Social History     Social History Narrative    Not on file      Medication Review  Current Outpatient Medications   Medication Instructions    albuterol (Proventil HFA) 90 mcg/actuation inhaler 2 puffs, inhalation, Every 4 hours PRN    alcohol swabs pads, medicated Use as directed to check blood glucose once daily.    aspirin 81 mg EC tablet 1 tablet, Daily    atorvastatin (LIPITOR) 10 mg, oral, Daily    BD Luer-Ella Syringe 3 mL 25 gauge x 1\" syringe use weekly with B-12 INJECTIONS as directed    blood sugar diagnostic (OneTouch Ultra Test) strip Use as directed to check blood glucose once daily.    blood-glucose meter (OneTouch Ultra2 Meter) misc Use as directed to monitor blood glucose daily.    calcium carbonate-vitamin D3 500 mg-5 mcg (200 unit) tablet 1 tablet, Daily    cyanocobalamin (VITAMIN B-12) 1,000 mcg, intramuscular, Every 30 days    Debrox 6.5 % otic solution Administer into affected ear(s).    dorzolamide-timolol, PF, (Cosopt, PF,) 2-0.5 % ophthalmic solution 1 drop, Both Eyes, 2 times daily    Droplet Pen Needle " "32 gauge x 5/32\" needle use 1 PEN NEEDLE to inject MEDICATION subcutaneously six times a day    empagliflozin (JARDIANCE) 25 mg, oral, Daily    escitalopram (LEXAPRO) 10 mg, oral, Nightly    fenofibrate (TRICOR) 145 mg, oral, Daily    lancets (OneTouch Delica Plus Lancet) 33 gauge misc Use as directed to check blood glucose once daily.    Lantus Solostar U-100 Insulin 52 Units, subcutaneous, Nightly, Take as directed per insulin instructions.    levothyroxine (SYNTHROID, LEVOXYL) 112 mcg, oral, Daily, Take on an empty stomach at the same time each day, either 30 to 60 minutes prior to breakfast    metoprolol tartrate (LOPRESSOR) 50 mg, oral, 2 times daily    Mounjaro 10 mg, subcutaneous, Every 7 days    tafluprost, PF, 0.0015 % dropperette 1 drop, ophthalmic (eye), Nightly    tamoxifen (NOLVADEX) 20 mg, oral, Daily    valsartan (DIOVAN) 320 mg, oral, Daily      Vitals  BP Readings from Last 2 Encounters:   07/24/25 110/80   02/26/25 118/62     Wt Readings from Last 3 Encounters:   07/24/25 75.8 kg (167 lb)   02/26/25 78.5 kg (173 lb)   01/13/25 78 kg (172 lb)      There is no height or weight on file to calculate BMI.  Labs  A1C  Lab Results   Component Value Date    HGBA1C 6.7 (A) 07/24/2025    HGBA1C 5.9 02/26/2025    HGBA1C 6.5 11/26/2024     Metabolic Panel  Lab Results   Component Value Date    EGFR 41 (L) 11/29/2024    CREATININE 1.32 (H) 11/29/2024     11/29/2024    K 5.4 (H) 11/29/2024    CALCIUM 10.1 11/29/2024     (H) 11/29/2024    CO2 26 11/29/2024    BUN 23 11/29/2024     Liver function  Lab Results   Component Value Date    ALT 13 11/29/2024    AST 13 11/29/2024    ALKPHOS 70 11/29/2024    BILITOT 0.4 11/29/2024     Lipid Panel  Lab Results   Component Value Date    CHOL 104 09/12/2023    TRIG 163 (H) 09/12/2023    HDL 28.0 (A) 09/12/2023     Urine Microalbumin  No results found for: \"MICROALBCREA\"    Assessment/Plan   Problem List Items Addressed This Visit       Diabetes mellitus, type 2 " (Multi)    Patient's A1c is 6.7% on 7/24/25. Goal A1c <7%.  Patient's SMBGs are improving and at goal. Jasper 30-day Avg  with TIR 85%.     Rationale for plan: Patient continues to tolerate current regimen well, no recent changes or concerns.Due to BG well-controlled, plan to continue current regimen and follow-up in 2 months.    Medication Changes:  CONTINUE  Jardiance 25mg daily  Lantus 52 units daily in PM  Metformin 1000mg BID  Mounjaro 10mg once weekly          Relevant Medications    empagliflozin (Jardiance) 25 mg tablet    Other Relevant Orders    Referral to Clinical Pharmacy     Patient Education:  Counseled patient on relevant medication mechanisms of action, expectations, side effects, duration of therapy, contraindications, administration, and monitoring parameters.  All questions and concerns addressed. Contact pharmacist with any further questions or concerns prior to next appointment.    Clinical Pharmacist follow-up: 9/24/25 at 9:40am, Telehealth visit  Patient is not followed in [facility].    Thank you,  Sola Alcaraz, [facility]  Clinical Pharmacy Specialist  [phone]    Continue all meds under the continuation of care with the referring provider and clinical pharmacy team.  Verbal consent to manage patient's drug therapy was obtained from the patient. They were informed they may decline to participate or withdraw from participation in pharmacy services at any time.                [1]   Allergies  Allergen Reactions    Adhesive Tape-Silicones Unknown    Codeine Itching, Nausea Only, Other and Unknown     n/v    Iodinated Contrast Media Unknown    Iodine Unknown    Liraglutide Diarrhea, Nausea Only, Headache and Unknown     victoza

## 2025-07-24 ENCOUNTER — APPOINTMENT (OUTPATIENT)
Dept: PRIMARY CARE | Facility: CLINIC | Age: 80
End: 2025-07-24
Payer: COMMERCIAL

## 2025-07-24 ENCOUNTER — PHARMACY VISIT (OUTPATIENT)
Dept: PHARMACY | Facility: CLINIC | Age: 80
End: 2025-07-24
Payer: COMMERCIAL

## 2025-07-24 VITALS
HEIGHT: 67 IN | BODY MASS INDEX: 26.21 KG/M2 | HEART RATE: 88 BPM | DIASTOLIC BLOOD PRESSURE: 80 MMHG | WEIGHT: 167 LBS | SYSTOLIC BLOOD PRESSURE: 110 MMHG | RESPIRATION RATE: 14 BRPM | OXYGEN SATURATION: 100 %

## 2025-07-24 DIAGNOSIS — H35.3211 EXUDATIVE AGE-RELATED MACULAR DEGENERATION, RIGHT EYE, WITH ACTIVE CHOROIDAL NEOVASCULARIZATION: ICD-10-CM

## 2025-07-24 DIAGNOSIS — N18.32 CHRONIC KIDNEY DISEASE, STAGE 3B (MULTI): ICD-10-CM

## 2025-07-24 DIAGNOSIS — E03.9 ACQUIRED HYPOTHYROIDISM: ICD-10-CM

## 2025-07-24 DIAGNOSIS — E11.9 TYPE 2 DIABETES MELLITUS WITHOUT COMPLICATION, WITHOUT LONG-TERM CURRENT USE OF INSULIN: Primary | ICD-10-CM

## 2025-07-24 LAB — POC HEMOGLOBIN A1C: 6.7 % (ref 4.2–6.5)

## 2025-07-24 PROCEDURE — 1159F MED LIST DOCD IN RCRD: CPT | Performed by: INTERNAL MEDICINE

## 2025-07-24 PROCEDURE — 3079F DIAST BP 80-89 MM HG: CPT | Performed by: INTERNAL MEDICINE

## 2025-07-24 PROCEDURE — 99213 OFFICE O/P EST LOW 20 MIN: CPT | Performed by: INTERNAL MEDICINE

## 2025-07-24 PROCEDURE — 83036 HEMOGLOBIN GLYCOSYLATED A1C: CPT | Performed by: INTERNAL MEDICINE

## 2025-07-24 PROCEDURE — 3074F SYST BP LT 130 MM HG: CPT | Performed by: INTERNAL MEDICINE

## 2025-07-24 ASSESSMENT — PATIENT HEALTH QUESTIONNAIRE - PHQ9
1. LITTLE INTEREST OR PLEASURE IN DOING THINGS: NOT AT ALL
2. FEELING DOWN, DEPRESSED OR HOPELESS: NOT AT ALL
SUM OF ALL RESPONSES TO PHQ9 QUESTIONS 1 AND 2: 0

## 2025-07-24 NOTE — PROGRESS NOTES
"Subjective   Luba Cortez is a 80 y.o. female who presents for FOLLOW UP     HPI   PER LOC DOING WELL.    MOST OF THE TIME FEEL PRETTY WELL, BUT IF WALK VERY FAR BALANCE GETS OFF AND LEAN TO THE LEFT    OTHER THAN THAT OK    FELL IN [place] GETTING ON A BUS, BUT JUST MISSED A STEP ON THE BUS, OTHERWISE NO FALLS    GET B12 INJ ONCE MONTHLY  Review of Systems   Constitutional:  Negative for chills, diaphoresis and fever.   Respiratory:  Negative for cough and shortness of breath.    Cardiovascular:  Negative for chest pain and leg swelling.   Gastrointestinal:  Negative for constipation, diarrhea and vomiting.   Musculoskeletal:  Negative for joint swelling and myalgias.       Objective   /80   Pulse 88   Resp 14   Ht 1.702 m (5' 7\")   Wt 75.8 kg (167 lb)   SpO2 100%   BMI 26.16 kg/m²     Physical Exam  Vitals reviewed.   Constitutional:       General: She is not in acute distress.     Appearance: She is not ill-appearing.     Eyes:      Comments: RIGHT LATERAL SCLERA S/P SCLERAL TRANSPLANT     Cardiovascular:      Rate and Rhythm: Normal rate and regular rhythm.      Pulses: Normal pulses.      Heart sounds:      No gallop.   Pulmonary:      Breath sounds: Normal breath sounds. No wheezing, rhonchi or rales.   Abdominal:      General: Abdomen is flat. Bowel sounds are normal.      Palpations: Abdomen is soft.      Tenderness: There is no guarding or rebound.     Musculoskeletal:      Right lower leg: No edema.      Left lower leg: No edema.     Skin:     Coloration: Skin is not jaundiced.         Assessment/Plan   Problem List Items Addressed This Visit       Diabetes mellitus, type 2 (Multi) - Primary    Relevant Orders    POCT glycosylated hemoglobin (Hb A1C) manually resulted    Hypothyroidism    Relevant Orders    TSH with reflex to Free T4 if abnormal    Exudative age-related macular degeneration, right eye, with active choroidal neovascularization    FOLLOWS CLOSELY WITH OPTHO         Chronic " kidney disease, stage 3b (Multi)    MONITOR WITH SERIAL LABS          Patient Instructions    A1C TODAY 6.7% = EXCELLENT DIABETIC CONTROL.  GOAL IS <7.0%.  FOR YOUR AGE CATEGORY AND TAKING INSULIN, IT IS SAFER FOR YOU TO RESIDE IN THE 6.5-7.5% RANGE.    2.  PLEASE CALL IF REFILLS ARE NEEDED    3.  CONTINUE DIET/EXERCISE/WEIGHT MANAGEMENT AS YOU ARE DOING    4.  SEE THE EYE DOCTORS AS YOU ARE DOING    5.  FASTING LABS ARE ORDERED FOR YOU FROM TODAY AS WELL AS FROM JANUARY 2025    6.  FOLLOW UP 6 MONTHS OR AS NEEDED.

## 2025-07-24 NOTE — PATIENT INSTRUCTIONS
A1C TODAY 6.7% = EXCELLENT DIABETIC CONTROL.  GOAL IS <7.0%.  FOR YOUR AGE CATEGORY AND TAKING INSULIN, IT IS SAFER FOR YOU TO RESIDE IN THE 6.5-7.5% RANGE.    2.  PLEASE CALL IF REFILLS ARE NEEDED    3.  CONTINUE DIET/EXERCISE/WEIGHT MANAGEMENT AS YOU ARE DOING    4.  SEE THE EYE DOCTORS AS YOU ARE DOING    5.  FASTING LABS ARE ORDERED FOR YOU FROM TODAY AS WELL AS FROM JANUARY 2025    6.  FOLLOW UP 6 MONTHS OR AS NEEDED.

## 2025-07-25 ENCOUNTER — PHARMACY VISIT (OUTPATIENT)
Dept: PHARMACY | Facility: CLINIC | Age: 80
End: 2025-07-25

## 2025-07-25 NOTE — ASSESSMENT & PLAN NOTE
Patient's A1c is 6.7% on 7/24/25. Goal A1c <7%.  Patient's SMBGs are improving and at goal. Jasper 30-day Avg  with TIR 85%.     Rationale for plan: Patient continues to tolerate current regimen well, no recent changes or concerns.Due to BG well-controlled, plan to continue current regimen and follow-up in 2 months.    Medication Changes:  CONTINUE  Jardiance 25mg daily  Lantus 52 units daily in PM  Metformin 1000mg BID  Mounjaro 10mg once weekly

## 2025-07-30 PROBLEM — H35.3211 EXUDATIVE AGE-RELATED MACULAR DEGENERATION, RIGHT EYE, WITH ACTIVE CHOROIDAL NEOVASCULARIZATION: Status: ACTIVE | Noted: 2025-07-30

## 2025-07-30 PROBLEM — N18.32 CHRONIC KIDNEY DISEASE, STAGE 3B (MULTI): Status: ACTIVE | Noted: 2025-07-30

## 2025-07-30 ASSESSMENT — ENCOUNTER SYMPTOMS
MYALGIAS: 0
FEVER: 0
VOMITING: 0
DIARRHEA: 0
DIAPHORESIS: 0
JOINT SWELLING: 0
CONSTIPATION: 0
CHILLS: 0
COUGH: 0
SHORTNESS OF BREATH: 0

## 2025-08-04 ENCOUNTER — APPOINTMENT (OUTPATIENT)
Dept: OPHTHALMOLOGY | Facility: CLINIC | Age: 80
End: 2025-08-04
Payer: COMMERCIAL

## 2025-08-04 DIAGNOSIS — C69.31: ICD-10-CM

## 2025-08-04 DIAGNOSIS — E08.3311: ICD-10-CM

## 2025-08-04 DIAGNOSIS — E11.3311 MODERATE NONPROLIFERATIVE DIABETIC RETINOPATHY OF RIGHT EYE WITH MACULAR EDEMA ASSOCIATED WITH TYPE 2 DIABETES MELLITUS: ICD-10-CM

## 2025-08-04 DIAGNOSIS — C69.91: ICD-10-CM

## 2025-08-04 DIAGNOSIS — H35.371 EPIRETINAL MEMBRANE (ERM) OF RIGHT EYE: ICD-10-CM

## 2025-08-04 DIAGNOSIS — E11.3211 MILD NONPROLIFERATIVE DIABETIC RETINOPATHY OF RIGHT EYE WITH MACULAR EDEMA ASSOCIATED WITH TYPE 2 DIABETES MELLITUS: Primary | ICD-10-CM

## 2025-08-04 DIAGNOSIS — H43.11 VITREOUS HEMORRHAGE OF RIGHT EYE (MULTI): ICD-10-CM

## 2025-08-04 PROCEDURE — 92134 CPTRZ OPH DX IMG PST SGM RTA: CPT | Performed by: OPHTHALMOLOGY

## 2025-08-04 PROCEDURE — 99213 OFFICE O/P EST LOW 20 MIN: CPT | Performed by: OPHTHALMOLOGY

## 2025-08-04 PROCEDURE — 2023F DILAT RTA XM W/O RTNOPTHY: CPT | Performed by: OPHTHALMOLOGY

## 2025-08-04 ASSESSMENT — CONF VISUAL FIELD
OD_SUPERIOR_NASAL_RESTRICTION: 0
OS_INFERIOR_NASAL_RESTRICTION: 3
OD_INFERIOR_TEMPORAL_RESTRICTION: 0
OS_INFERIOR_TEMPORAL_RESTRICTION: 3
OS_SUPERIOR_TEMPORAL_RESTRICTION: 3
OS_SUPERIOR_NASAL_RESTRICTION: 3
OD_INFERIOR_NASAL_RESTRICTION: 0
OD_SUPERIOR_TEMPORAL_RESTRICTION: 0
OD_NORMAL: 1

## 2025-08-04 ASSESSMENT — PACHYMETRY
OS_CT(UM): 551
OD_CT(UM): 562

## 2025-08-04 ASSESSMENT — ENCOUNTER SYMPTOMS
CONSTITUTIONAL NEGATIVE: 0
GASTROINTESTINAL NEGATIVE: 0
CARDIOVASCULAR NEGATIVE: 0
EYES NEGATIVE: 1
HEMATOLOGIC/LYMPHATIC NEGATIVE: 0
MUSCULOSKELETAL NEGATIVE: 0
ALLERGIC/IMMUNOLOGIC NEGATIVE: 0
RESPIRATORY NEGATIVE: 0
ENDOCRINE NEGATIVE: 1
PSYCHIATRIC NEGATIVE: 0
NEUROLOGICAL NEGATIVE: 0

## 2025-08-04 ASSESSMENT — VISUAL ACUITY
OD_PH_SC: 20/60+2
OS_SC: 20/30+2
METHOD: SNELLEN - LINEAR
OD_SC: 20/70+2

## 2025-08-04 ASSESSMENT — TONOMETRY
OS_IOP_MMHG: 13
OD_IOP_MMHG: 14
IOP_METHOD: GOLDMANN APPLANATION

## 2025-08-04 NOTE — PROGRESS NOTES
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  -     OCT, Retina - OU - Both Eyes  -     Color Fundus Photography - OU - Both Eyes  Malignant neoplasm of unspecified site of right eye (Multi)  -     OCT, Retina - OU - Both Eyes  -     Color Fundus Photography - OU - Both Eyes  Epiretinal membrane (ERM) of right eye  -     OCT, Retina - OU - Both Eyes  -     Color Fundus Photography - OU - Both Eyes  Moderate nonproliferative diabetic retinopathy of right eye with macular edema associated with diabetes mellitus due to underlying condition  -     OCT, Retina - OU - Both Eyes  -     Color Fundus Photography - OU - Both Eyes  Vitreous hemorrhage of right eye (Multi)  -     OCT, Retina - OU - Both Eyes  -     Color Fundus Photography - OU - Both Eyes  Moderate nonproliferative diabetic retinopathy of right eye with macular edema associated with type 2 diabetes mellitus  -     OCT, Retina - OU - Both Eyes  -     Color Fundus Photography - OU - Both Eyes  Choroid melanoma of right eye (Multi)  -     OCT, Retina - OU - Both Eyes  -     Color Fundus Photography - OU - Both Eyes      Impression    1 E11.3311 Controlled type 2 diabetes mellitus with right eye affected by moderate nonproliferative retinopathy and macular edema-Worsening  2 H43.11 Vitreous hemorrhage of right eye-Worsening  3 H40.053 Ocular hypertension, bilateral-Stable  4 T66.XXXA Radiation retinopathy-Improving  5 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  -appears stable  -Last CT abd pelvis 9/2017: no liver metastasis- small nodule that has not changed.    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 Healthy lifestyle.       4) OHTN OU, glaucoma suspect  -continue FU and gtts per Dr. Lim.     5 today has DME OD       TREAT RIGHT PRN no inj neeeded

## 2025-08-14 ENCOUNTER — PHARMACY VISIT (OUTPATIENT)
Dept: PHARMACY | Facility: CLINIC | Age: 80
End: 2025-08-14
Payer: COMMERCIAL

## 2025-08-14 PROCEDURE — RXMED WILLOW AMBULATORY MEDICATION CHARGE

## 2025-08-15 ENCOUNTER — APPOINTMENT (OUTPATIENT)
Dept: OPHTHALMOLOGY | Facility: CLINIC | Age: 80
End: 2025-08-15
Payer: COMMERCIAL

## 2025-08-15 DIAGNOSIS — E11.3211 MILD NONPROLIFERATIVE DIABETIC RETINOPATHY OF RIGHT EYE WITH MACULAR EDEMA ASSOCIATED WITH TYPE 2 DIABETES MELLITUS: ICD-10-CM

## 2025-08-15 DIAGNOSIS — H40.1131 PRIMARY OPEN ANGLE GLAUCOMA OF BOTH EYES, MILD STAGE: Primary | ICD-10-CM

## 2025-08-15 PROCEDURE — 92133 CPTRZD OPH DX IMG PST SGM ON: CPT | Performed by: OPHTHALMOLOGY

## 2025-08-15 PROCEDURE — 92083 EXTENDED VISUAL FIELD XM: CPT | Performed by: OPHTHALMOLOGY

## 2025-08-15 PROCEDURE — 99214 OFFICE O/P EST MOD 30 MIN: CPT | Performed by: OPHTHALMOLOGY

## 2025-08-15 ASSESSMENT — ENCOUNTER SYMPTOMS
CARDIOVASCULAR NEGATIVE: 0
MUSCULOSKELETAL NEGATIVE: 0
HEMATOLOGIC/LYMPHATIC NEGATIVE: 0
PSYCHIATRIC NEGATIVE: 0
GASTROINTESTINAL NEGATIVE: 0
ENDOCRINE NEGATIVE: 1
EYES NEGATIVE: 1
ALLERGIC/IMMUNOLOGIC NEGATIVE: 0
NEUROLOGICAL NEGATIVE: 0
CONSTITUTIONAL NEGATIVE: 0
RESPIRATORY NEGATIVE: 0

## 2025-08-15 ASSESSMENT — TONOMETRY
IOP_METHOD: GOLDMANN APPLANATION
OS_IOP_MMHG: 14
OD_IOP_MMHG: 15

## 2025-08-15 ASSESSMENT — PACHYMETRY
OD_CT(UM): 562
OS_CT(UM): 551

## 2025-08-15 ASSESSMENT — CUP TO DISC RATIO
OS_RATIO: 0.3
OD_RATIO: 0.6

## 2025-08-15 ASSESSMENT — EXTERNAL EXAM - LEFT EYE: OS_EXAM: NORMAL

## 2025-08-15 ASSESSMENT — SLIT LAMP EXAM - LIDS
COMMENTS: NORMAL
COMMENTS: NORMAL

## 2025-08-15 ASSESSMENT — VISUAL ACUITY
METHOD: SNELLEN - LINEAR
OD_SC: 20/80-2
OS_SC: 20/30

## 2025-08-15 ASSESSMENT — CONF VISUAL FIELD: COMMENTS: HVF

## 2025-08-15 ASSESSMENT — EXTERNAL EXAM - RIGHT EYE: OD_EXAM: NORMAL

## 2025-08-17 DIAGNOSIS — I10 PRIMARY HYPERTENSION: ICD-10-CM

## 2025-08-17 DIAGNOSIS — Z00.00 HEALTHCARE MAINTENANCE: ICD-10-CM

## 2025-08-17 DIAGNOSIS — E03.9 ACQUIRED HYPOTHYROIDISM: ICD-10-CM

## 2025-08-18 RX ORDER — AMLODIPINE BESYLATE 2.5 MG/1
2.5 TABLET ORAL DAILY
Qty: 90 TABLET | Refills: 3 | Status: SHIPPED | OUTPATIENT
Start: 2025-08-18

## 2025-08-18 RX ORDER — TAMOXIFEN CITRATE 20 MG/1
20 TABLET ORAL DAILY
Qty: 90 TABLET | Refills: 3 | Status: SHIPPED | OUTPATIENT
Start: 2025-08-18

## 2025-08-18 RX ORDER — LEVOTHYROXINE SODIUM 112 UG/1
112 TABLET ORAL
Qty: 90 TABLET | Refills: 3 | Status: SHIPPED | OUTPATIENT
Start: 2025-08-18

## 2025-08-22 PROCEDURE — RXMED WILLOW AMBULATORY MEDICATION CHARGE

## 2025-08-25 ENCOUNTER — PHARMACY VISIT (OUTPATIENT)
Dept: PHARMACY | Facility: CLINIC | Age: 80
End: 2025-08-25
Payer: COMMERCIAL

## 2025-09-24 ENCOUNTER — APPOINTMENT (OUTPATIENT)
Dept: PHARMACY | Facility: HOSPITAL | Age: 80
End: 2025-09-24
Payer: MEDICARE

## 2025-11-12 ENCOUNTER — APPOINTMENT (OUTPATIENT)
Dept: OPHTHALMOLOGY | Facility: CLINIC | Age: 80
End: 2025-11-12
Payer: MEDICARE

## 2025-11-24 ENCOUNTER — APPOINTMENT (OUTPATIENT)
Dept: PRIMARY CARE | Facility: CLINIC | Age: 80
End: 2025-11-24
Payer: COMMERCIAL

## 2026-02-20 ENCOUNTER — APPOINTMENT (OUTPATIENT)
Dept: OPHTHALMOLOGY | Facility: CLINIC | Age: 81
End: 2026-02-20
Payer: MEDICARE